# Patient Record
Sex: FEMALE | Race: WHITE | Employment: PART TIME | URBAN - METROPOLITAN AREA
[De-identification: names, ages, dates, MRNs, and addresses within clinical notes are randomized per-mention and may not be internally consistent; named-entity substitution may affect disease eponyms.]

---

## 2017-06-07 NOTE — TELEPHONE ENCOUNTER
Pt need rx refill diltiazem to be sent to pharmacy on file.   Pt have appt on 7-7-17@ 11am    Thanks

## 2017-06-08 RX ORDER — DILTIAZEM HYDROCHLORIDE 240 MG/1
240 CAPSULE, COATED, EXTENDED RELEASE ORAL DAILY
Qty: 30 CAP | Refills: 0 | Status: SHIPPED | OUTPATIENT
Start: 2017-06-08 | End: 2017-07-07 | Stop reason: SDUPTHER

## 2017-07-07 ENCOUNTER — OFFICE VISIT (OUTPATIENT)
Dept: CARDIOLOGY CLINIC | Age: 42
End: 2017-07-07

## 2017-07-07 VITALS
HEIGHT: 66 IN | RESPIRATION RATE: 16 BRPM | DIASTOLIC BLOOD PRESSURE: 86 MMHG | SYSTOLIC BLOOD PRESSURE: 114 MMHG | HEART RATE: 73 BPM | BODY MASS INDEX: 21.79 KG/M2 | OXYGEN SATURATION: 99 % | WEIGHT: 135.6 LBS

## 2017-07-07 DIAGNOSIS — R06.02 SOB (SHORTNESS OF BREATH): ICD-10-CM

## 2017-07-07 DIAGNOSIS — I47.1 PAROXYSMAL SVT (SUPRAVENTRICULAR TACHYCARDIA) (HCC): Primary | ICD-10-CM

## 2017-07-07 DIAGNOSIS — R07.89 CHEST TIGHTNESS: ICD-10-CM

## 2017-07-07 DIAGNOSIS — E78.2 MIXED HYPERLIPIDEMIA: ICD-10-CM

## 2017-07-07 DIAGNOSIS — R00.2 PALPITATIONS: ICD-10-CM

## 2017-07-07 RX ORDER — DILTIAZEM HYDROCHLORIDE 300 MG/1
300 CAPSULE, COATED, EXTENDED RELEASE ORAL DAILY
Qty: 30 CAP | Refills: 11 | Status: SHIPPED | OUTPATIENT
Start: 2017-07-07 | End: 2018-07-25 | Stop reason: SDUPTHER

## 2017-07-07 NOTE — PROGRESS NOTES
See cardiology for results      Patient received a 2 week event monitor. Instructions given verbally as well as an instruction sheet. Pt verbalized understanding.

## 2017-07-07 NOTE — MR AVS SNAPSHOT
Visit Information Date & Time Provider Department Dept. Phone Encounter #  
 7/7/2017 11:30 AM [facility] [phone] [id number] Upcoming Health Maintenance Date Due DTaP/Tdap/Td series (1 - Tdap) 10/15/1995 PAP AKA CERVICAL CYTOLOGY 10/15/1995 INFLUENZA AGE 9 TO ADULT 8/1/2017 Allergies as of 7/7/2017  Review Complete On: 7/7/2017 By: Sg Falcon, NP Severity Noted Reaction Type Reactions Codeine  08/18/2012    Itching Codeine  08/24/2012    Itching Lipitor [Atorvastatin]  01/03/2013    Hives Current Immunizations  Never Reviewed No immunizations on file. Not reviewed this visit You Were Diagnosed With   
  
 Codes Comments Paroxysmal SVT (supraventricular tachycardia) (HCC)    -  Primary ICD-10-CM: I47.1 ICD-9-CM: 427.0 Vitals LMP OB Status Smoking Status 06/14/2017 (Approximate) Having regular periods Never Smoker Preferred Pharmacy Pharmacy Name Phone [id number] [address] [phone] Your Updated Medication List  
  
   
This list is accurate as of: 7/7/17 11:51 AM.  Always use your most recent med list.  
  
  
  
  
 BENADRYL 25 mg capsule Generic drug:  diphenhydrAMINE Take 25 mg by mouth as needed. Alternating with Claritan.  
  
 butalbital-acetaminophen-caffeine -40 mg per tablet Commonly known as:  Brennon Miles Take 2 Tabs by mouth every eight (8) hours as needed for Pain or Headache. Max Daily Amount: 6 Tabs. Refill at 1 month intervals  Indications: MIGRAINE, TENSION-TYPE HEADACHE  
  
 cholecalciferol (vitamin D3) 2,000 unit Tab Take  by mouth. CLARITIN 10 mg tablet Generic drug:  loratadine Take 10 mg by mouth daily. dilTIAZem  mg ER capsule Commonly known as:  CARDIZEM CD Take 1 Cap by mouth daily. escitalopram oxalate 5 mg tablet Commonly known as:  Hilo Evelyne Take 1 Tab by mouth daily. MOTRIN  mg tablet Generic drug:  ibuprofen Take 400 mg by mouth every six (6) hours as needed. naproxen 500 mg tablet Commonly known as:  NAPROSYN Take 500 mg by mouth two (2) times daily (with meals). NEXPLANON 68 mg Impl Generic drug:  etonogestrel  
by SubDERmal route. ondansetron 4 mg disintegrating tablet Commonly known as:  ZOFRAN ODT Take 1 Tab by mouth every eight (8) hours as needed for Nausea. pravastatin 40 mg tablet Commonly known as:  PRAVACHOL  
TAKE 1 TABLET EVERY DAY  
  
 traMADol 50 mg tablet Commonly known as:  ULTRAM  
Take 50 mg by mouth every six (6) hours as needed for Pain. We Performed the Following LOOP MONITOR [[id number] Custom] Please provide this summary of care documentation to your next provider. Your primary care clinician is listed as Janet Kwok. If you have any questions after today's visit, please call [phone].

## 2017-07-07 NOTE — PROGRESS NOTES
Subjective/HPI:     Wing Schirmer is a 42 y.o. female is here for f/u appt. She reports that she has noticed over the past few months having increased frequency of palpitations. She would say on average it now occurs once a week. She notices them primarily in the evening. It is a rapid fluttering lasting about a minute. She has noticed feeling often like she needs to take a deep breath. She has tightness and heart squeezing sensation in her chest at times. The patient denies orthopnea, PND, LE edema, syncope, presyncope or fatigue. PCP Provider  Criselda Yee MD  Past Medical History:   Diagnosis Date    Hypercholesteremia     PSVT (paroxysmal supraventricular tachycardia) ([facility] [address]       Past Surgical History:   Procedure Laterality Date    HX GYN       x1    HX GYN      right salpingectomy    HX HEENT      tubes left ear    HX ORTHOPAEDIC      right mensicus and ACL     Allergies   Allergen Reactions    Codeine Itching    Codeine Itching    Lipitor [Atorvastatin] Hives      Family History   Problem Relation Age of Onset    Neuropathy Mother     Diabetes Mother     Cancer Father      prostate    Heart Disease Father     Stroke Father     High Cholesterol Father     Cancer Maternal Grandmother      pancreatic    Heart Disease Paternal Grandmother     Stroke Paternal Grandmother     Anxiety Child     Attention Deficit Hyperactivity Disorder Child     Other Child      autism and dyslexia      Current Outpatient Prescriptions   Medication Sig    etonogestrel (NEXPLANON) 68 mg impl by SubDERmal route.  dilTIAZem CD (CARDIZEM CD) 240 mg ER capsule Take 1 Cap by mouth daily.  escitalopram oxalate (LEXAPRO) 5 mg tablet Take 1 Tab by mouth daily.  butalbital-acetaminophen-caffeine (FIORICET, ESGIC) -40 mg per tablet Take 2 Tabs by mouth every eight (8) hours as needed for Pain or Headache. Max Daily Amount: 6 Tabs.  Refill at 1 month intervals  Indications: MIGRAINE, TENSION-TYPE HEADACHE    ondansetron (ZOFRAN ODT) 4 mg disintegrating tablet Take 1 Tab by mouth every eight (8) hours as needed for Nausea.  cholecalciferol, vitamin D3, 2,000 unit tab Take  by mouth.  diphenhydrAMINE (BENADRYL) 25 mg capsule Take 25 mg by mouth as needed. Alternating with Claritan.  loratadine (CLARITIN) 10 mg tablet Take 10 mg by mouth daily.  ibuprofen (MOTRIN IB) 200 mg tablet Take 400 mg by mouth every six (6) hours as needed.  naproxen (NAPROSYN) 500 mg tablet Take 500 mg by mouth two (2) times daily (with meals).  traMADol (ULTRAM) 50 mg tablet Take 50 mg by mouth every six (6) hours as needed for Pain.  pravastatin (PRAVACHOL) 40 mg tablet TAKE 1 TABLET EVERY DAY     No current facility-administered medications for this visit. Vitals:    07/07/17 1051 07/07/17 1059   BP: 118/90 114/86   Pulse: 73    Resp: 16    SpO2: 99%    Weight: 135 lb 9.6 oz (61.5 kg)    Height: 5' 6\" (1.676 m)      Social History     Social History    Marital status:      Spouse name: N/A    Number of children: N/A    Years of education: N/A     Occupational History    Not on file. Social History Main Topics    Smoking status: Never Smoker    Smokeless tobacco: Not on file    Alcohol use No      Comment: socially    Drug use: Not on file    Sexual activity: Not on file     Other Topics Concern    Not on file     Social History Narrative    ** Merged History Encounter **            I have reviewed the nurses notes, vitals, problem list, allergy list, medical history, family, social history and medications. Review of Symptoms:    General: Pt denies excessive weight gain or loss. Pt is able to conduct ADL's  HEENT: Denies blurred vision, headaches, epistaxis and difficulty swallowing. Respiratory: + shortness of breath, ANDRES, wheezing or stridor.   Cardiovascular: +chest tightness/squeezing, + palpitations, edema or PND  Gastrointestinal: Denies poor appetite, indigestion, abdominal pain or blood in stool  Urinary: Denies dysuria, pyuria  Musculoskeletal: Denies pain or swelling from muscles or joints  Neurologic: Denies tremor, paresthesias, or sensory motor disturbance  Skin: Denies rash, itching or texture change. Psych: Denies depression        Physical Exam:      General: Well developed, in no acute distress, cooperative and alert  HEENT: No carotid bruits, no JVD, trach is midline. Neck Supple, PEERL, EOM intact. Heart:  Normal S1/S2 negative S3 or S4. Regular, no murmur, gallop or rub.   Respiratory: Clear bilaterally x 4, no wheezing or rales  Abdomen:   Soft, non-tender, no masses, bowel sounds are active.   Extremities:  No edema, normal cap refill, no cyanosis, atraumatic. Neuro: A&Ox3, speech clear, gait stable. Skin: Skin color is normal. No rashes or lesions.  Non diaphoretic  Vascular: 2+ pulses symmetric in all extremities    Cardiographics    ECG: SR, HR 63    Results for orders placed or performed in visit on 08/24/12   HOLTER MONITOR, 24 HOURS    Narrative    ECG Monitor/24 hours, Complete    Reason for Holter Monitor   palpitations    Heartbeat    Slowest 49  Average 87  Fastest  181      Results:   Underlying Rhythm: Normal sinus rhythm      Atrial Arrhythmias: premature atrial contractions; rare, PSVT             AV Conduction: normal    Ventricular Arrhythmias: none     ST Segment Analysis:normal     Symptom Correlation:  PSVT    Comment:   PSVT up to 180 BPM.     Sara Nelson MD                    Cardiology Labs:  No results found for: CHOL, CHOLX, CHLST, CHOLV, 033795, HDL, LDL, LDLC, DLDLP, TGLX, TRIGL, TRIGP, CHHD, [facility]    Lab Results   Component Value Date/Time    Sodium 141 08/18/2012 04:09 PM    Potassium 3.2 08/18/2012 04:09 PM    Chloride 106 08/18/2012 04:09 PM    CO2 31 08/18/2012 04:09 PM    Anion gap 4 08/18/2012 04:09 PM    Glucose 92 08/18/2012 04:09 PM    BUN 15 08/18/2012 04:09 PM    Creatinine 1.1 08/18/2012 04:09 PM    BUN/Creatinine ratio 14 08/18/2012 04:09 PM    GFR est AA >60 08/18/2012 04:09 PM    GFR est non-AA 59 08/18/2012 04:09 PM    Calcium 9.9 08/18/2012 04:09 PM    AST (SGOT) 20 08/18/2012 04:09 PM    Alk. phosphatase 110 08/18/2012 04:09 PM    Protein, total 8.4 08/18/2012 04:09 PM    Albumin 4.6 08/18/2012 04:09 PM    Globulin 3.8 08/18/2012 04:09 PM    A-G Ratio 1.2 08/18/2012 04:09 PM    ALT (SGPT) 37 08/18/2012 04:09 PM           Assessment:     Assessment:     Randal Medina was seen today for svt, cholesterol problem, palpitations, shortness of breath and fatigue. Diagnoses and all orders for this visit:    Paroxysmal SVT (supraventricular tachycardia) (HCC)  -     AMB POC EKG ROUTINE W/ 12 LEADS, INTER & REP  -     TSH REFLEX TO T4  -     MAGNESIUM  -     METABOLIC PANEL, BASIC  -     EKG TREADMILL STRESS TEST; Future    Palpitations  -     TSH REFLEX TO T4  -     MAGNESIUM  -     METABOLIC PANEL, BASIC  -     EKG TREADMILL STRESS TEST; Future    Mixed hyperlipidemia  -     EKG TREADMILL STRESS TEST; Future    Chest tightness  -     TSH REFLEX TO T4  -     MAGNESIUM  -     METABOLIC PANEL, BASIC  -     EKG TREADMILL STRESS TEST; Future    SOB (shortness of breath)  -     TSH REFLEX TO T4  -     MAGNESIUM  -     METABOLIC PANEL, BASIC  -     EKG TREADMILL STRESS TEST; Future        ICD-10-CM ICD-9-CM    1. Paroxysmal SVT (supraventricular tachycardia) (HCC) I47.1 427.0 AMB POC EKG ROUTINE W/ 12 LEADS, INTER & REP      TSH REFLEX TO T4      MAGNESIUM      METABOLIC PANEL, BASIC      EKG TREADMILL STRESS TEST   2. Palpitations R00.2 785.1 TSH REFLEX TO T4      MAGNESIUM      METABOLIC PANEL, BASIC      EKG TREADMILL STRESS TEST   3. Mixed hyperlipidemia E78.2 272.2 EKG TREADMILL STRESS TEST   4. Chest tightness R07.89 786.59 TSH REFLEX TO T4      MAGNESIUM      METABOLIC PANEL, BASIC      EKG TREADMILL STRESS TEST   5.  SOB (shortness of breath) R06.02 786.05 TSH REFLEX TO T4      MAGNESIUM      METABOLIC PANEL, BASIC      EKG TREADMILL STRESS TEST     Orders Placed This Encounter    TSH REFLEX TO T4    MAGNESIUM    METABOLIC PANEL, BASIC    AMB POC EKG ROUTINE W/ 12 LEADS, INTER & REP     Order Specific Question:   Reason for Exam:     Answer:   Routine    EKG TREADMILL STRESS TEST     Standing Status:   Future     Standing Expiration Date:   1/6/2018     Order Specific Question:   Reason for Exam:     Answer:   chest tightness, sob    etonogestrel (NEXPLANON) 68 mg impl     Sig: by SubDERmal route. Plan:     Patient presents today for f/u appt, reports increased frequency of palpitations, occurring not less than every 48 hours, on average once a week. She is also having sob and chest tightness/squeezing. EKG remains SR. BP normotensive. I will evaluate with an event monitor, ekg treadmill stress test, and BMP/MG/TSH. I will increase her Diltiazem to 300mg po every day. f/u in after testing is completed. Subhash Glasgow NP      [facility]    7/7/2017         Agree with note as outlined by  NP. I confirm findings in history and physical exam. No additional findings noted. Agree with plan as outlined above.      Kristine Gonzalez MD

## 2017-07-07 NOTE — PROGRESS NOTES
Chief Complaint   Patient presents with    SVT     annual follow up    Cholesterol Problem     \"    Palpitations     pt reports palpitations are more frequent x 3 mos    Shortness of Breath    Fatigue

## 2017-07-11 LAB
BUN SERPL-MCNC: 13 MG/DL (ref 6–24)
BUN/CREAT SERPL: 14 (ref 9–23)
CALCIUM SERPL-MCNC: 9.2 MG/DL (ref 8.7–10.2)
CHLORIDE SERPL-SCNC: 101 MMOL/L (ref 96–106)
CO2 SERPL-SCNC: 27 MMOL/L (ref 18–29)
CREAT SERPL-MCNC: 0.94 MG/DL (ref 0.57–1)
GLUCOSE SERPL-MCNC: 84 MG/DL (ref 65–99)
MAGNESIUM SERPL-MCNC: 2.2 MG/DL (ref 1.6–2.3)
POTASSIUM SERPL-SCNC: 4.5 MMOL/L (ref 3.5–5.2)
SODIUM SERPL-SCNC: 141 MMOL/L (ref 134–144)
TSH SERPL DL<=0.005 MIU/L-ACNC: 1.38 UIU/ML (ref 0.45–4.5)

## 2017-07-28 NOTE — PROGRESS NOTES
Spoke with patient  Verified patient with 2 patient identifier  Informed per Dr Billy Mis normal.  Patient verbalized understanding.

## 2017-08-02 ENCOUNTER — CLINICAL SUPPORT (OUTPATIENT)
Dept: CARDIOLOGY CLINIC | Age: 42
End: 2017-08-02

## 2017-08-02 ENCOUNTER — TELEPHONE (OUTPATIENT)
Dept: CARDIOLOGY CLINIC | Age: 42
End: 2017-08-02

## 2017-08-02 DIAGNOSIS — E78.2 MIXED HYPERLIPIDEMIA: ICD-10-CM

## 2017-08-02 DIAGNOSIS — R06.02 SOB (SHORTNESS OF BREATH): ICD-10-CM

## 2017-08-02 DIAGNOSIS — R00.2 PALPITATIONS: ICD-10-CM

## 2017-08-02 DIAGNOSIS — I47.1 PAROXYSMAL SVT (SUPRAVENTRICULAR TACHYCARDIA) (HCC): ICD-10-CM

## 2017-08-02 DIAGNOSIS — R07.89 CHEST TIGHTNESS: ICD-10-CM

## 2017-08-02 NOTE — TELEPHONE ENCOUNTER
Verified patient with two identifiers. Pt informed of event monitor results. Appt made for Aug 14 to discuss all test results. Pt verbalized understanding.

## 2017-08-02 NOTE — TELEPHONE ENCOUNTER
----- Message from [address], MD sent at 7/31/2017  6:09 PM EDT -----  Event monitor shows PVC's.  F/u after stress test. thx.

## 2017-08-14 ENCOUNTER — OFFICE VISIT (OUTPATIENT)
Dept: CARDIOLOGY CLINIC | Age: 42
End: 2017-08-14

## 2017-08-14 VITALS
RESPIRATION RATE: 18 BRPM | OXYGEN SATURATION: 98 % | WEIGHT: 137.8 LBS | SYSTOLIC BLOOD PRESSURE: 124 MMHG | BODY MASS INDEX: 22.14 KG/M2 | HEIGHT: 66 IN | HEART RATE: 68 BPM | DIASTOLIC BLOOD PRESSURE: 78 MMHG

## 2017-08-14 DIAGNOSIS — I47.1 PAROXYSMAL SVT (SUPRAVENTRICULAR TACHYCARDIA) (HCC): Primary | ICD-10-CM

## 2017-08-14 NOTE — PROGRESS NOTES
Disha Perez DNP, ANP-BC  Subjective/HPI:     Kurtis Matson is a 42 y.o. female is here for routine f/u. The patient denies chest pain/ shortness of breath, orthopnea, PND, LE edema, palpitations, syncope, presyncope or fatigue. Pt reports improvement in palpitations. Normal Exercise Stress Test   ECG RECORDING, XMIT/REVIEW/INTRPRET    Reason for Event Monitor  PALPITATIONS    Monitoring period 2 Weeks    Events Recorded 11           Event Monitor Results:    Arrhthymias:premature ventricular contractions    Symptom Correlation:   yes    Comments:   Single PVC's that appear to correlate with patient symptoms.      [address], MD       PCP Provider  Cris Garces MD  Past Medical History:   Diagnosis Date    Hypercholesteremia     PSVT (paroxysmal supraventricular tachycardia) ([facility] [address]       Past Surgical History:   Procedure Laterality Date    HX GYN       x1    HX GYN      right salpingectomy    HX HEENT      tubes left ear    HX ORTHOPAEDIC      right mensicus and ACL     Allergies   Allergen Reactions    Codeine Itching    Codeine Itching    Lipitor [Atorvastatin] Hives      Family History   Problem Relation Age of Onset    Neuropathy Mother     Diabetes Mother     Cancer Father      prostate    Heart Disease Father     Stroke Father     High Cholesterol Father     Cancer Maternal Grandmother      pancreatic    Heart Disease Paternal Grandmother     Stroke Paternal Grandmother     Anxiety Child     Attention Deficit Hyperactivity Disorder Child     Other Child      autism and dyslexia      Current Outpatient Prescriptions   Medication Sig    etonogestrel (NEXPLANON) 68 mg impl by SubDERmal route.  dilTIAZem CD (CARDIZEM CD) 300 mg ER capsule Take 1 Cap by mouth daily.  escitalopram oxalate (LEXAPRO) 5 mg tablet Take 1 Tab by mouth daily.     butalbital-acetaminophen-caffeine (FIORICET, ESGIC) -40 mg per tablet Take 2 Tabs by mouth every eight (8) hours as needed for Pain or Headache. Max Daily Amount: 6 Tabs. Refill at 1 month intervals  Indications: MIGRAINE, TENSION-TYPE HEADACHE    ondansetron (ZOFRAN ODT) 4 mg disintegrating tablet Take 1 Tab by mouth every eight (8) hours as needed for Nausea.  cholecalciferol, vitamin D3, 2,000 unit tab Take  by mouth.  diphenhydrAMINE (BENADRYL) 25 mg capsule Take 25 mg by mouth as needed. Alternating with Claritan.  loratadine (CLARITIN) 10 mg tablet Take 10 mg by mouth daily.  ibuprofen (MOTRIN IB) 200 mg tablet Take 400 mg by mouth every six (6) hours as needed.  naproxen (NAPROSYN) 500 mg tablet Take 500 mg by mouth two (2) times daily (with meals).  traMADol (ULTRAM) 50 mg tablet Take 50 mg by mouth every six (6) hours as needed for Pain.  pravastatin (PRAVACHOL) 40 mg tablet TAKE 1 TABLET EVERY DAY     No current facility-administered medications for this visit. Vitals:    08/14/17 1555 08/14/17 1600 08/14/17 1622   BP: (!) 120/100 (!) 118/100 124/78   Pulse: 68     Resp: 18     SpO2: 98%     Weight: 137 lb 12.8 oz (62.5 kg)     Height: 5' 6\" (1.676 m)       Social History     Social History    Marital status:      Spouse name: N/A    Number of children: N/A    Years of education: N/A     Occupational History    Not on file. Social History Main Topics    Smoking status: Never Smoker    Smokeless tobacco: Never Used    Alcohol use No      Comment: socially    Drug use: Not on file    Sexual activity: Not on file     Other Topics Concern    Not on file     Social History Narrative    ** Merged History Encounter **            I have reviewed the nurses notes, vitals, problem list, allergy list, medical history, family, social history and medications. Review of Symptoms:    General: Pt denies excessive weight gain or loss.  Pt is able to conduct ADL's  HEENT: Denies blurred vision, headaches, epistaxis and difficulty swallowing. Respiratory: Denies shortness of breath, ANDRES, wheezing or stridor. Cardiovascular: Denies precordial pain, palpitations, edema or PND  Gastrointestinal: Denies poor appetite, indigestion, abdominal pain or blood in stool  Musculoskeletal: Denies pain or swelling from muscles or joints  Neurologic: Denies tremor, paresthesias, or sensory motor disturbance  Skin: Denies rash, itching or texture change. Physical Exam:      General: Well developed, in no acute distress, cooperative and alert  HEENT: No carotid bruits, no JVD, trach is midline. Neck Supple, PEERL, EOM intact. Heart:  Normal S1/S2 negative S3 or S4. Regular, no murmur, gallop or rub.   Respiratory: Clear bilaterally x 4, no wheezing or rales  Abdomen:   Soft, non-tender, no masses, bowel sounds are active.   Extremities:  No edema, normal cap refill, no cyanosis, atraumatic. Neuro: A&Ox3, speech clear, gait stable. Skin: Skin color is normal. No rashes or lesions.  Non diaphoretic  Vascular: 2+ pulses symmetric in all extremities    Cardiographics    ECG:   Results for orders placed or performed in visit on 08/24/12   HOLTER MONITOR, 24 HOURS    Narrative    ECG Monitor/24 hours, Complete    Reason for Holter Monitor   palpitations    Heartbeat    Slowest 49  Average 87  Fastest  181      Results:   Underlying Rhythm: Normal sinus rhythm      Atrial Arrhythmias: premature atrial contractions; rare, PSVT             AV Conduction: normal    Ventricular Arrhythmias: none     ST Segment Analysis:normal     Symptom Correlation:  PSVT    Comment:   PSVT up to 180 BPM.     Kell Park MD                    Cardiology Labs:  No results found for: CHOL, CHOLX, CHLST, CHOLV, 065430, HDL, LDL, LDLC, DLDLP, TGLX, TRIGL, TRIGP, CHHD, CHHDX    Lab Results   Component Value Date/Time    Sodium 141 07/10/2017 08:34 AM    Potassium 4.5 07/10/2017 08:34 AM    Chloride 101 07/10/2017 08:34 AM    CO2 27 07/10/2017 08:34 AM    Anion gap 4 08/18/2012 04:09 PM    Glucose 84 07/10/2017 08:34 AM    BUN 13 07/10/2017 08:34 AM    Creatinine 0.94 07/10/2017 08:34 AM    BUN/Creatinine ratio 14 07/10/2017 08:34 AM    GFR est AA 87 07/10/2017 08:34 AM    GFR est non-AA 75 07/10/2017 08:34 AM    Calcium 9.2 07/10/2017 08:34 AM    Bilirubin, total 0.6 08/18/2012 04:09 PM    AST (SGOT) 20 08/18/2012 04:09 PM    Alk. phosphatase 110 08/18/2012 04:09 PM    Protein, total 8.4 08/18/2012 04:09 PM    Albumin 4.6 08/18/2012 04:09 PM    Globulin 3.8 08/18/2012 04:09 PM    A-G Ratio 1.2 08/18/2012 04:09 PM    ALT (SGPT) 37 08/18/2012 04:09 PM           Assessment:     Assessment:     Diagnoses and all orders for this visit:    1. Paroxysmal SVT (supraventricular tachycardia) ([address]        ICD-10-CM ICD-9-CM    1. Paroxysmal SVT (supraventricular tachycardia) (HCC) I47.1 427.0      No orders of the defined types were placed in this encounter. Plan:     Ms. Melyssa Pérez is a 43-year-old female with history of paroxysmal SVT, normal exercise stress test, event monitor shows symptomatic PVCs. Tolerating higher dose of diltiazem. Continue same, previous discussion of ablation prefers to continue medicinal therapy at this time. Camilo Sandoval NP      [facility]    8/14/2017         Agree with note as outlined by  NP. I confirm findings in history and physical exam. No additional findings noted. Agree with plan as outlined above.      [address] MD

## 2017-12-02 DIAGNOSIS — F07.81 POST CONCUSSION SYNDROME: ICD-10-CM

## 2017-12-02 DIAGNOSIS — G44.309 POST-CONCUSSION HEADACHE: ICD-10-CM

## 2017-12-02 RX ORDER — ESCITALOPRAM OXALATE 5 MG/1
TABLET ORAL
Qty: 60 TAB | Refills: 4 | Status: SHIPPED | OUTPATIENT
Start: 2017-12-02 | End: 2018-09-28 | Stop reason: SDUPTHER

## 2018-02-28 ENCOUNTER — HOSPITAL ENCOUNTER (OUTPATIENT)
Dept: PREADMISSION TESTING | Age: 43
Discharge: HOME | End: 2018-02-28
Attending: OBSTETRICS & GYNECOLOGY
Payer: COMMERCIAL

## 2018-02-28 VITALS
HEIGHT: 66 IN | DIASTOLIC BLOOD PRESSURE: 87 MMHG | OXYGEN SATURATION: 99 % | TEMPERATURE: 98.3 F | RESPIRATION RATE: 16 BRPM | HEART RATE: 52 BPM | WEIGHT: 143.96 LBS | BODY MASS INDEX: 23.14 KG/M2 | SYSTOLIC BLOOD PRESSURE: 157 MMHG

## 2018-02-28 LAB
ABO + RH BLD: NORMAL
APPEARANCE UR: CLEAR
BACTERIA URNS QL MICRO: NEGATIVE /HPF
BILIRUB UR QL: NEGATIVE
BLOOD GROUP ANTIBODIES SERPL: NORMAL
COLOR UR: ABNORMAL
EPITH CASTS URNS QL MICRO: ABNORMAL /LPF
ERYTHROCYTE [DISTWIDTH] IN BLOOD BY AUTOMATED COUNT: 12.3 % (ref 11.5–14.5)
GLUCOSE UR STRIP.AUTO-MCNC: NEGATIVE MG/DL
HCT VFR BLD AUTO: 44.7 % (ref 35–47)
HGB BLD-MCNC: 15 G/DL (ref 11.5–16)
HGB UR QL STRIP: ABNORMAL
HYALINE CASTS URNS QL MICRO: ABNORMAL /LPF (ref 0–5)
KETONES UR QL STRIP.AUTO: NEGATIVE MG/DL
LEUKOCYTE ESTERASE UR QL STRIP.AUTO: ABNORMAL
MCH RBC QN AUTO: 32.2 PG (ref 26–34)
MCHC RBC AUTO-ENTMCNC: 33.6 G/DL (ref 30–36.5)
MCV RBC AUTO: 95.9 FL (ref 80–99)
NITRITE UR QL STRIP.AUTO: NEGATIVE
NRBC # BLD: 0 K/UL (ref 0–0.01)
NRBC BLD-RTO: 0 PER 100 WBC
PH UR STRIP: 6.5 [PH] (ref 5–8)
PLATELET # BLD AUTO: 241 K/UL (ref 150–400)
PMV BLD AUTO: 11.5 FL (ref 8.9–12.9)
PROT UR STRIP-MCNC: NEGATIVE MG/DL
RBC # BLD AUTO: 4.66 M/UL (ref 3.8–5.2)
RBC #/AREA URNS HPF: ABNORMAL /HPF (ref 0–5)
SP GR UR REFRACTOMETRY: 1.01 (ref 1–1.03)
SPECIMEN EXP DATE BLD: NORMAL
UA: UC IF INDICATED,UAUC: ABNORMAL
UROBILINOGEN UR QL STRIP.AUTO: 0.2 EU/DL (ref 0.2–1)
WBC # BLD AUTO: 6.2 K/UL (ref 3.6–11)
WBC URNS QL MICRO: ABNORMAL /HPF (ref 0–4)

## 2018-02-28 PROCEDURE — 81001 URINALYSIS AUTO W/SCOPE: CPT | Performed by: OBSTETRICS & GYNECOLOGY

## 2018-02-28 PROCEDURE — 36415 COLL VENOUS BLD VENIPUNCTURE: CPT | Performed by: OBSTETRICS & GYNECOLOGY

## 2018-02-28 PROCEDURE — 85027 COMPLETE CBC AUTOMATED: CPT | Performed by: OBSTETRICS & GYNECOLOGY

## 2018-02-28 PROCEDURE — 86850 RBC ANTIBODY SCREEN: CPT | Performed by: OBSTETRICS & GYNECOLOGY

## 2018-02-28 RX ORDER — SODIUM CHLORIDE, SODIUM LACTATE, POTASSIUM CHLORIDE, CALCIUM CHLORIDE 600; 310; 30; 20 MG/100ML; MG/100ML; MG/100ML; MG/100ML
25 INJECTION, SOLUTION INTRAVENOUS CONTINUOUS
Status: CANCELLED | OUTPATIENT
Start: 2018-03-09

## 2018-02-28 RX ORDER — CEFAZOLIN SODIUM/WATER 2 G/20 ML
2 SYRINGE (ML) INTRAVENOUS ONCE
Status: CANCELLED | OUTPATIENT
Start: 2018-03-09 | End: 2018-03-09

## 2018-02-28 NOTE — H&P
Visit Type:  Pre-Op  Primary Provider:  Kenny Domingo MD    CC:  endometriosis. History of Present Illness:  36 yr old patient presents today for pre op     Procedure Info:  Procedure: TLH, bilateral salpingectomy, Nexplanon removal  Diagnosis Primary: ENDOMETRIOSIS  Anticipated Time Out of Work: 3-6 weeks  Surg Date: 2018  Surgery Loc: [facility]  Start Time: 11am          Vital Signs   36Years Old Female  Height:  67.75 inches  Weight: 145 pounds  BMI:      22.29  BSA:      1.78  BP:       124/84    Past Pregnancy History   : 4  Para:     2  Aborta:  2  Term: 2, Premature: 0, Living Children: 2, Vaginal Deliveries: 1, C-Sections: 1, Elect. Ab: 1, Ectopics: 1    Gynecologic History   Additional Menses Information: nexplanon - irregular  Does patient have any problems with urine leakage? no  Does patient have any other bladder problems? no  History of abnormal pap: no  Gardasil Injection History: Not Applicable  Pt currently sexually active: yes  Current Contraception: Nexplanon. placed 2014  History of STD: no   The patient opts not to have HIV testing today.   Allergies    CODEINE (Moderate)  TERAZOL 7 (TERCONAZOLE CREA) (Moderate)      Medications Removed from Medication List          Past Medical History:     Reviewed history from 2016 and no changes required:        Jean Paul Cobble        Infertility        Endometriosis        PSVT         2014 nexplanon insertion    Past Surgical History:     Reviewed history from 2009 and no changes required:        Laparoscopies - right salpingectomy        Dilatation & Curettage         x 1        ectopic x 1        -1        EAB x 1        ACL reconstruction of right knee 09    Family History Summary:      Reviewed history Last on 2018 and no changes required:2018      General Comments - FH:  Family history transferred to [address] [place] [address]    Social History:     Reviewed history from 2011 and no changes required:                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      Risk Factors:     Smoked Tobacco Use:  Never smoker  Smokeless Tobacco Use:  Never  Passive smoke exposure:  no  Drug use:  no  HIV high-risk behavior:  no  Caffeine use:  12oz/day drinks per day  Alcohol use:  yes     Type:  Occassional  Exercise:  yes     Times per week:  5+     Type of Exercise:  Walking  Seatbelt use:  100 %  Sun Exposure:  Frequently    Dietary Counseling: yes    Previous Tobacco Use: Signed On 2018  Smoked Tobacco Use:  Never smoker  Passive smoke exposure:  no  Drug use:  no  HIV high-risk behavior:  no  Caffeine use:  12oz/day drinks per day    Previous Alcohol Use: Signed On 2018  Alcohol use:  yes     Type:  Occassional  Exercise:  yes     Times per week:  5+     Type of Exercise:  Walking  Seatbelt use:  100 %  Sun Exposure:  Frequently    Dietary Counseling: yes    Mammogram History:     Date of Last Mammogram:  2018    PAP Smear History:     Date of Last PAP Smear:  2016      Past Pregnancy History      :  4     Term Births:  2     Premature Births: 0     Living Children: 2     Para:   2     Prev : 1     Aborta:  2     Elect. Ab:  1     Spont.  Ab:  0     Ectopics:  1    Pregnancy # 1     Delivery date:        Comments:  ectopic     Pregnancy # 2     Delivery date:        Weeks Gestation: 39     Delivery type:        Hours of labor:   18     Anesthesia type:   epidural     Delivery location:   [facility]     Infant Sex:  Female     Birth weight:  7lbs 1 oz     Name:  Mahnaz     Comments:  dr. Lawson Later to progress past 8 cms    Pregnancy # 3     Delivery date:   10/10/2007     Delivery type:        Delivery location:   [facility]     Infant Sex:  Male     Birth weight:  7lbs 7ozs     Comments:  Del by Dr. Stevie Callejas       Review of Systems        See HPI      General Medical Physical Exam:     General Appearance:       well developed, well nourished, in no acute distress    Head: Inspection:  normocephalic without obvious abnormalities    Eyes:        External:  EOM intact    Ears, Nose, Throat:        External:  normocephalic and atraumatic       Hearing:  grossly intact    Neck:        Neck:  supple; no masses; trachea midline       Thyroid:  no nodules, masses, tenderness, or enlargement    Respiratory:        Resp. effort:  no use of accessory muscles       Auscultation:   no rales, rhonchi, or wheezes    Cardiovascular: Auscultation:   normal S1 and  S2; no murmur, rub, or gallop       Peripheral circ: no cyanosis, clubbing, or edema    Gastrointestinal:        Abdomen:  soft and non-tender; no masses       Liver/spleen:   no enlargement or nodularity       Hernia:  no hernias    Musculoskeletal:        Gait/station:  normal gait    Skin:        Inspection:  no rashes, suspicious lesions, or ulcerations    Neurological:        Other:  grossly intact    Psychiatric:       Orientation:  oriented to time, place, and person              Impression & Recommendations:    Problem # 1:  ENDOMETRIOSIS (ICD-617.9) (JUT35-H32.9)   We will plan total laparoscopic hysterectomy. We discussed the risks and benefits of ovarian conservation and the pt would like to have her ovaries left in place as long as they are normal.  She understands that oopherectomy would mean immediate menopause. We also discussed supracervical vs total laparoscopic hyst, and she would like to proceed with the total procedure. She understands that if surgical complications necessitate supracervical hyst, that there is a risk of cyclic menstrual bleeding. I reviewed with the patient indications, alternatives, risks, and benefits of proposed procedure, the risks of which include injury to bowel, bladder, nerves, blood vessels, or ureters, injury to any other intraabdominal structure, risk of bleeding and infection, and inherent risks of anesthesia, including death.   The patient indicates understanding of these risks, and agrees to the proposed procedure. She is instructed to stay NPO after midnight the night before surgery. Also planning to remove nexplanon from left arm.      Orders:  Pre-Op Exam (CPT-PO)  Specimen Handling (CPT-14216)  CBC w/o Dif ([id number])      Medications (at conclusion of this visit)    02/12/2018 LEXAPRO TABLET (ESCITALOPRAM OXALATE TABS) Prescribed by non 606/70Lior Levine MD  03/23/2016 TOLTERODINE TARTRATE ER 4 MG ORAL CAPSULE EXTENDED RELEASE 24 HOUR (TOLTERODINE TARTRATE) 1 by mouth daily  09/19/2012 NEXPLANON 68 MG SUBCUTANEOUS IMPLANT (ETONOGESTREL) placed 2014 09/19/2012 DILTIAZEM CD CAPSULE EXTENDED RELEASE 24 HOUR (DILTIAZEM HCL COATED BEADS XR24H-CAP) Prescribed by non 606/Jessica Levine MD  12/02/2009 MULTIVITAMINS   TABS (MULTIPLE VITAMIN) Prescribed by daniel Clifton/Jessica Levine MD          Electronically signed by Leon Ching MD on 02/28/2018 at 8:37 AM    ________________________________________________________________________

## 2018-02-28 NOTE — PERIOP NOTES
Called Dr Ayana Ocasio office and spoke to [place] regarding order sheet with surgical procedure. Needs clarified as patient has had right salpingectomy in the past. Denver to clarify with Dr Jim Arguelles and will send new order sheet and call surgical posting to correct.

## 2018-02-28 NOTE — PERIOP NOTES
Incentive Spirometer        Using the incentive spirometer helps expand the small air sacs of your lungs, helps you breathe deeply, and helps improve your lung function. Use your incentive spirometer twice a day (10 breaths each time) prior to surgery. How to Use Your Incentive Spirometer:  1. Hold the incentive spirometer in an upright position. 2. Breathe out as usual.   3. Place the mouthpiece in your mouth and seal your lips tightly around it. 4. Take a deep breath. Breathe in slowly and as deeply as possible. Keep the blue flow rate guide between the arrows. 5. Hold your breath as long as possible. Then exhale slowly and allow the piston to fall to the bottom of the column. 6. Rest for a few seconds and repeat steps one through five at least 10 times. PAT Tidal Volume___________2500_______  x____2____________  Date 02/28/2018_______________________    Floreen Glance THE INCENTIVE SPIROMETER WITH YOU TO THE HOSPITAL ON THE DAY OF YOUR SURGERY. Opportunity given to ask and answer questions as well as to observe return demonstration.     Patient signature_____________________________    Witness____________________________

## 2018-02-28 NOTE — PERIOP NOTES
[facility]  Preoperative Instructions        Surgery Date 03/09/2018          Time of Arrival 0900 am Contact # [phone]    1. On the day of your surgery, please report to the Surgical Services Registration Desk and sign in at your designated time. The Surgery Center is located to the right of the Emergency Room. 2. You must have someone with you to drive you home. You should not drive a car for 24 hours following surgery. Please make arrangements for a friend or family member to stay with you for the first 24 hours after your surgery. 3. Do not have anything to eat or drink (including water, gum, mints, coffee, juice) after midnight ?? .? This may not apply to medications prescribed by your physician. ?(Please note below the special instructions with medications to take the morning of your procedure.)    4. We recommend you do not drink any alcoholic beverages for 24 hours before and after your surgery. 5. Contact your surgeons office for instructions on the following medications: non-steroidal anti-inflammatory drugs (i.e. Advil, Aleve), vitamins, and supplements. (Some surgeons will want you to stop these medications prior to surgery and others may allow you to take them)  **If you are currently taking Plavix, Coumadin, Aspirin and/or other blood-thinning agents, contact your surgeon for instructions. ** Your surgeon will partner with the physician prescribing these medications to determine if it is safe to stop or if you need to continue taking. Please do not stop taking these medications without instructions from your surgeon    6. Wear comfortable clothes. Wear glasses instead of contacts. Do not bring any money or jewelry. Please bring picture ID, insurance card, and any prearranged co-payment or hospital payment. Do not wear make-up, particularly mascara the morning of your surgery. Do not wear nail polish, particularly if you are having foot /hand surgery. Wear your hair loose or down, no ponytails, buns, brian pins or clips. All body piercings must be removed. Please shower with antibacterial soap for three consecutive days before and on the morning of surgery, but do not apply any lotions, powders or deodorants after the shower on the day of surgery. Please use a fresh towels after each shower. Please sleep in clean clothes and change bed linens the night before surgery. Please do not shave for 48 hours prior to surgery. Shaving of the face is acceptable. 7. You should understand that if you do not follow these instructions your surgery may be cancelled. If your physical condition changes (I.e. fever, cold or flu) please contact your surgeon as soon as possible. 8. It is important that you be on time. If a situation occurs where you may be late, please call [phone] (OR Holding Area). 9. If you have any questions and or problems, please call [phone] (Pre-admission Testing). 10. Your surgery time may be subject to change. You will receive a phone call the evening prior if your time changes. 11.  If having outpatient surgery, you must have someone to drive you here, stay with you during the duration of your stay, and to drive you home at time of discharge. 12.   In an effort to improve the efficiency, privacy, and safety for all of our Pre-op patients visitors are not allowed in the Holding area. Once you arrive and are registered your family/visitors will be asked to remain in the waiting room. The Pre-op staff will get you from the Surgical Waiting Area and will explain to you and your family/visitors that the Pre-op phase is beginning. The staff will answer any questions and provide instructions for tracking of the patient, by use of the existing tracking number and color-coded status board in the waiting room.   At this time the staff will also ask for your designated spokesperson information in the event that the physician or staff need to provide an update or obtain any pertinent information. The designated spokesperson will be notified if the physician needs to speak to family during the pre-operative phase. If at any time your family/visitors has questions or concerns they may approach the volunteer desk in the waiting area for assistance. Special Instructions:    MEDICATIONS TO TAKE THE MORNING OF SURGERY WITH A SIP OF Amina Bon if needed      I understand a pre-operative phone call will be made to verify my surgery time. In the event that I am not available, I give permission for a message to be left on my answering service and/or with another person?   Yes       ___________________      __________   _________    (Signature of Patient)             (Witness)                (Date and Time)

## 2018-03-07 NOTE — H&P
Visit Type:  Pre-Op  Primary Provider:  Lulu Barrios MD    CC:  endometriosis. History of Present Illness:  36 yr old patient presents today for pre op     Procedure Info:  Procedure: TLH, bilateral salpingectomy, Nexplanon removal  Diagnosis Primary: ENDOMETRIOSIS  Anticipated Time Out of Work: 3-6 weeks  Surg Date: 2018  Surgery Loc: [facility]  Start Time: 11am          Vital Signs   36Years Old Female  Height:  67.75 inches  Weight: 145 pounds  BMI:      22.29  BSA:      1.78  BP:       124/84    Past Pregnancy History   : 4  Para:     2  Aborta:  2  Term: 2, Premature: 0, Living Children: 2, Vaginal Deliveries: 1, C-Sections: 1, Elect. Ab: 1, Ectopics: 1    Gynecologic History   Additional Menses Information: nexplanon - irregular  Does patient have any problems with urine leakage? no  Does patient have any other bladder problems? no  History of abnormal pap: no  Gardasil Injection History: Not Applicable  Pt currently sexually active: yes  Current Contraception: Nexplanon. placed 2014  History of STD: no   The patient opts not to have HIV testing today.   Allergies    CODEINE (Moderate)  TERAZOL 7 (TERCONAZOLE CREA) (Moderate)      Medications Removed from Medication List          Past Medical History:     Reviewed history from 2016 and no changes required:        Sonal Jean        Infertility        Endometriosis        PSVT         2014 nexplanon insertion    Past Surgical History:     Reviewed history from 2009 and no changes required:        Laparoscopies - right salpingectomy        Dilatation & Curettage         x 1        ectopic x 1        -1        EAB x 1        ACL reconstruction of right knee 09    Family History Summary:      Reviewed history Last on 2018 and no changes required:2018      General Comments - FH:  Family history transferred to [address] [place] [address] [address] [place] [address]    Social History:     Reviewed history from 2011 and no changes required:                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      Risk Factors:     Smoked Tobacco Use:  Never smoker  Smokeless Tobacco Use:  Never  Passive smoke exposure:  no  Drug use:  no  HIV high-risk behavior:  no  Caffeine use:  12oz/day drinks per day  Alcohol use:  yes     Type:  Occassional  Exercise:  yes     Times per week:  5+     Type of Exercise:  Walking  Seatbelt use:  100 %  Sun Exposure:  Frequently    Dietary Counseling: yes    Previous Tobacco Use: Signed On 2018  Smoked Tobacco Use:  Never smoker  Passive smoke exposure:  no  Drug use:  no  HIV high-risk behavior:  no  Caffeine use:  12oz/day drinks per day    Previous Alcohol Use: Signed On 2018  Alcohol use:  yes     Type:  Occassional  Exercise:  yes     Times per week:  5+     Type of Exercise:  Walking  Seatbelt use:  100 %  Sun Exposure:  Frequently    Dietary Counseling: yes    Mammogram History:     Date of Last Mammogram:  2018    PAP Smear History:     Date of Last PAP Smear:  2016      Past Pregnancy History      :  4     Term Births:  2     Premature Births: 0     Living Children: 2     Para:   2     Prev : 1     Aborta:  2     Elect. Ab:  1     Spont.  Ab:  0     Ectopics:  1    Pregnancy # 1     Delivery date:        Comments:  ectopic     Pregnancy # 2     Delivery date:        Weeks Gestation: 39     Delivery type:        Hours of labor:   18     Anesthesia type:   epidural     Delivery location:   [facility]     Infant Sex:  Female     Birth weight:  7lbs 1 oz     Name:  Mahnaz     Comments:  dr. Mario Warren to progress past 8 cms    Pregnancy # 3     Delivery date:   10/10/2007     Delivery type:        Delivery location:   [facility]     Infant Sex:  Male     Birth weight:  7lbs 7ozs     Comments:  Del by Dr. Simran Harris       Review of Systems        See HPI      General Medical Physical Exam:     General Appearance:       well developed, well nourished, in no acute distress    Head: Inspection:  normocephalic without obvious abnormalities    Eyes:        External:  EOM intact    Ears, Nose, Throat:        External:  normocephalic and atraumatic       Hearing:  grossly intact    Neck:        Neck:  supple; no masses; trachea midline       Thyroid:  no nodules, masses, tenderness, or enlargement    Respiratory:        Resp. effort:  no use of accessory muscles       Auscultation:   no rales, rhonchi, or wheezes    Cardiovascular: Auscultation:   normal S1 and  S2; no murmur, rub, or gallop       Peripheral circ: no cyanosis, clubbing, or edema    Gastrointestinal:        Abdomen:  soft and non-tender; no masses       Liver/spleen:   no enlargement or nodularity       Hernia:  no hernias    Musculoskeletal:        Gait/station:  normal gait    Skin:        Inspection:  no rashes, suspicious lesions, or ulcerations    Neurological:        Other:  grossly intact    Psychiatric:       Orientation:  oriented to time, place, and person              Impression & Recommendations:    Problem # 1:  ENDOMETRIOSIS (ICD-617.9) (JSJ16-S35.9)   We will plan total laparoscopic hysterectomy. We discussed the risks and benefits of ovarian conservation and the pt would like to have her ovaries left in place as long as they are normal.  She understands that oopherectomy would mean immediate menopause. We also discussed supracervical vs total laparoscopic hyst, and she would like to proceed with the total procedure. She understands that if surgical complications necessitate supracervical hyst, that there is a risk of cyclic menstrual bleeding. I reviewed with the patient indications, alternatives, risks, and benefits of proposed procedure, the risks of which include injury to bowel, bladder, nerves, blood vessels, or ureters, injury to any other intraabdominal structure, risk of bleeding and infection, and inherent risks of anesthesia, including death.   The patient indicates understanding of these risks, and agrees to the proposed procedure. She is instructed to stay NPO after midnight the night before surgery. Also planning to remove nexplanon from left arm. Orders:  Pre-Op Exam (CPT-PO)  Specimen Handling (CPT-[id number])  CBC w/o Dif ([id number])      Medications (at conclusion of this visit)    02/12/2018 LEXAPRO TABLET (ESCITALOPRAM OXALATE TABS) Prescribed by non 606/70Lior Levine MD  03/23/2016 TOLTERODINE TARTRATE ER 4 MG ORAL CAPSULE EXTENDED RELEASE 24 HOUR (TOLTERODINE TARTRATE) 1 by mouth daily  09/19/2012 NEXPLANON 68 MG SUBCUTANEOUS IMPLANT (ETONOGESTREL) placed 2014 09/19/2012 DILTIAZEM CD CAPSULE EXTENDED RELEASE 24 HOUR (DILTIAZEM HCL COATED BEADS XR24H-CAP) Prescribed by non 606/Jessica Levine MD  12/02/2009 MULTIVITAMINS   TABS (MULTIPLE VITAMIN) Prescribed by daniel Clifton/Jessica Levine MD          Electronically signed by Alicia Dumas MD on 02/28/2018 at 8:37 AM    ________________________________________________________________________    The History and Physical is reviewed today. The patient is seen and examined and no changes are required.   Alicia Dumas MD  3/9/2018  11:10 AM

## 2018-03-09 ENCOUNTER — HOSPITAL ENCOUNTER (OUTPATIENT)
Age: 43
Setting detail: OUTPATIENT SURGERY
Discharge: HOME | End: 2018-03-09
Attending: OBSTETRICS & GYNECOLOGY | Admitting: OBSTETRICS & GYNECOLOGY
Payer: COMMERCIAL

## 2018-03-09 ENCOUNTER — ANESTHESIA (OUTPATIENT)
Dept: SURGERY | Age: 43
End: 2018-03-09
Payer: COMMERCIAL

## 2018-03-09 ENCOUNTER — ANESTHESIA EVENT (OUTPATIENT)
Dept: SURGERY | Age: 43
End: 2018-03-09
Payer: COMMERCIAL

## 2018-03-09 VITALS
HEART RATE: 77 BPM | OXYGEN SATURATION: 97 % | SYSTOLIC BLOOD PRESSURE: 124 MMHG | WEIGHT: 142.42 LBS | BODY MASS INDEX: 22.89 KG/M2 | RESPIRATION RATE: 16 BRPM | HEIGHT: 66 IN | TEMPERATURE: 97.8 F | DIASTOLIC BLOOD PRESSURE: 78 MMHG

## 2018-03-09 DIAGNOSIS — G44.309 POST-CONCUSSION HEADACHE: Primary | ICD-10-CM

## 2018-03-09 LAB — HCG UR QL: NEGATIVE

## 2018-03-09 PROCEDURE — 77030020061 HC IV BLD WRMR ADMIN SET 3M -B: Performed by: NURSE ANESTHETIST, CERTIFIED REGISTERED

## 2018-03-09 PROCEDURE — 81025 URINE PREGNANCY TEST: CPT

## 2018-03-09 PROCEDURE — 77030008517 HC TBNG INSUF ENDO STOR -B: Performed by: OBSTETRICS & GYNECOLOGY

## 2018-03-09 PROCEDURE — 74011250636 HC RX REV CODE- 250/636

## 2018-03-09 PROCEDURE — 77030010032 HC SCLPL DISECT HARM J&J -C: Performed by: OBSTETRICS & GYNECOLOGY

## 2018-03-09 PROCEDURE — 77030026438 HC STYL ET INTUB CARD -A: Performed by: NURSE ANESTHETIST, CERTIFIED REGISTERED

## 2018-03-09 PROCEDURE — 77030020702 HC ADAPT HARM DISP J&J -B: Performed by: OBSTETRICS & GYNECOLOGY

## 2018-03-09 PROCEDURE — 76210000020 HC REC RM PH II FIRST 0.5 HR: Performed by: OBSTETRICS & GYNECOLOGY

## 2018-03-09 PROCEDURE — 74011250636 HC RX REV CODE- 250/636: Performed by: ANESTHESIOLOGY

## 2018-03-09 PROCEDURE — 74011000250 HC RX REV CODE- 250

## 2018-03-09 PROCEDURE — 88307 TISSUE EXAM BY PATHOLOGIST: CPT | Performed by: OBSTETRICS & GYNECOLOGY

## 2018-03-09 PROCEDURE — 77030008684 HC TU ET CUF COVD -B: Performed by: NURSE ANESTHETIST, CERTIFIED REGISTERED

## 2018-03-09 PROCEDURE — 74011250637 HC RX REV CODE- 250/637: Performed by: ANESTHESIOLOGY

## 2018-03-09 PROCEDURE — 77030018684: Performed by: OBSTETRICS & GYNECOLOGY

## 2018-03-09 PROCEDURE — 77030008771 HC TU NG SALEM SUMP -A: Performed by: NURSE ANESTHETIST, CERTIFIED REGISTERED

## 2018-03-09 PROCEDURE — 77030008606 HC TRCR ENDOSC KII AMR -B: Performed by: OBSTETRICS & GYNECOLOGY

## 2018-03-09 PROCEDURE — 76210000017 HC OR PH I REC 1.5 TO 2 HR: Performed by: OBSTETRICS & GYNECOLOGY

## 2018-03-09 PROCEDURE — 77030008756 HC TU IRR SUC STRY -B: Performed by: OBSTETRICS & GYNECOLOGY

## 2018-03-09 PROCEDURE — 74011250636 HC RX REV CODE- 250/636: Performed by: OBSTETRICS & GYNECOLOGY

## 2018-03-09 PROCEDURE — 77030018836 HC SOL IRR NACL ICUM -A: Performed by: OBSTETRICS & GYNECOLOGY

## 2018-03-09 PROCEDURE — 77030035236 HC SUT PDS STRATFX BARB J&J -B: Performed by: OBSTETRICS & GYNECOLOGY

## 2018-03-09 PROCEDURE — 76060000034 HC ANESTHESIA 1.5 TO 2 HR: Performed by: OBSTETRICS & GYNECOLOGY

## 2018-03-09 PROCEDURE — 77030019908 HC STETH ESOPH SIMS -A: Performed by: NURSE ANESTHETIST, CERTIFIED REGISTERED

## 2018-03-09 PROCEDURE — 77030020263 HC SOL INJ SOD CL0.9% LFCR 1000ML: Performed by: OBSTETRICS & GYNECOLOGY

## 2018-03-09 PROCEDURE — 77030010507 HC ADH SKN DERMBND J&J -B: Performed by: OBSTETRICS & GYNECOLOGY

## 2018-03-09 PROCEDURE — 76010000153 HC OR TIME 1.5 TO 2 HR: Performed by: OBSTETRICS & GYNECOLOGY

## 2018-03-09 PROCEDURE — 77030002933 HC SUT MCRYL J&J -A: Performed by: OBSTETRICS & GYNECOLOGY

## 2018-03-09 PROCEDURE — 77030034850: Performed by: OBSTETRICS & GYNECOLOGY

## 2018-03-09 PROCEDURE — 77030032490 HC SLV COMPR SCD KNE COVD -B: Performed by: OBSTETRICS & GYNECOLOGY

## 2018-03-09 PROCEDURE — 77030016151 HC PROTCTR LNS DFOG COVD -B: Performed by: OBSTETRICS & GYNECOLOGY

## 2018-03-09 PROCEDURE — 77030034154 HC SHR COAG HARM ACE J&J -F: Performed by: OBSTETRICS & GYNECOLOGY

## 2018-03-09 RX ORDER — OXYCODONE AND ACETAMINOPHEN 5; 325 MG/1; MG/1
1-2 TABLET ORAL
Qty: 20 TAB | Refills: 0 | Status: SHIPPED | OUTPATIENT
Start: 2018-03-09 | End: 2020-09-10

## 2018-03-09 RX ORDER — MIDAZOLAM HYDROCHLORIDE 1 MG/ML
INJECTION, SOLUTION INTRAMUSCULAR; INTRAVENOUS AS NEEDED
Status: DISCONTINUED | OUTPATIENT
Start: 2018-03-09 | End: 2018-03-09 | Stop reason: HOSPADM

## 2018-03-09 RX ORDER — SCOLOPAMINE TRANSDERMAL SYSTEM 1 MG/1
1 PATCH, EXTENDED RELEASE TRANSDERMAL
Status: DISCONTINUED | OUTPATIENT
Start: 2018-03-09 | End: 2018-03-09 | Stop reason: HOSPADM

## 2018-03-09 RX ORDER — LIDOCAINE HYDROCHLORIDE 20 MG/ML
INJECTION, SOLUTION EPIDURAL; INFILTRATION; INTRACAUDAL; PERINEURAL AS NEEDED
Status: DISCONTINUED | OUTPATIENT
Start: 2018-03-09 | End: 2018-03-09 | Stop reason: HOSPADM

## 2018-03-09 RX ORDER — MIDAZOLAM HYDROCHLORIDE 1 MG/ML
0.5 INJECTION, SOLUTION INTRAMUSCULAR; INTRAVENOUS
Status: DISCONTINUED | OUTPATIENT
Start: 2018-03-09 | End: 2018-03-09 | Stop reason: HOSPADM

## 2018-03-09 RX ORDER — FENTANYL CITRATE 50 UG/ML
25 INJECTION, SOLUTION INTRAMUSCULAR; INTRAVENOUS
Status: DISCONTINUED | OUTPATIENT
Start: 2018-03-09 | End: 2018-03-09 | Stop reason: HOSPADM

## 2018-03-09 RX ORDER — SODIUM CHLORIDE, SODIUM LACTATE, POTASSIUM CHLORIDE, CALCIUM CHLORIDE 600; 310; 30; 20 MG/100ML; MG/100ML; MG/100ML; MG/100ML
25 INJECTION, SOLUTION INTRAVENOUS CONTINUOUS
Status: DISCONTINUED | OUTPATIENT
Start: 2018-03-09 | End: 2018-03-09 | Stop reason: HOSPADM

## 2018-03-09 RX ORDER — DIPHENHYDRAMINE HYDROCHLORIDE 50 MG/ML
12.5 INJECTION, SOLUTION INTRAMUSCULAR; INTRAVENOUS AS NEEDED
Status: COMPLETED | OUTPATIENT
Start: 2018-03-09 | End: 2018-03-09

## 2018-03-09 RX ORDER — SODIUM CHLORIDE, SODIUM LACTATE, POTASSIUM CHLORIDE, CALCIUM CHLORIDE 600; 310; 30; 20 MG/100ML; MG/100ML; MG/100ML; MG/100ML
100 INJECTION, SOLUTION INTRAVENOUS CONTINUOUS
Status: DISCONTINUED | OUTPATIENT
Start: 2018-03-09 | End: 2018-03-09 | Stop reason: HOSPADM

## 2018-03-09 RX ORDER — LABETALOL HYDROCHLORIDE 5 MG/ML
INJECTION, SOLUTION INTRAVENOUS AS NEEDED
Status: DISCONTINUED | OUTPATIENT
Start: 2018-03-09 | End: 2018-03-09 | Stop reason: HOSPADM

## 2018-03-09 RX ORDER — ACETAMINOPHEN 10 MG/ML
INJECTION, SOLUTION INTRAVENOUS AS NEEDED
Status: DISCONTINUED | OUTPATIENT
Start: 2018-03-09 | End: 2018-03-09 | Stop reason: HOSPADM

## 2018-03-09 RX ORDER — PROPOFOL 10 MG/ML
INJECTION, EMULSION INTRAVENOUS
Status: DISCONTINUED | OUTPATIENT
Start: 2018-03-09 | End: 2018-03-09 | Stop reason: HOSPADM

## 2018-03-09 RX ORDER — ONDANSETRON 2 MG/ML
4 INJECTION INTRAMUSCULAR; INTRAVENOUS AS NEEDED
Status: DISCONTINUED | OUTPATIENT
Start: 2018-03-09 | End: 2018-03-09 | Stop reason: HOSPADM

## 2018-03-09 RX ORDER — FENTANYL CITRATE 50 UG/ML
INJECTION, SOLUTION INTRAMUSCULAR; INTRAVENOUS AS NEEDED
Status: DISCONTINUED | OUTPATIENT
Start: 2018-03-09 | End: 2018-03-09 | Stop reason: HOSPADM

## 2018-03-09 RX ORDER — ONDANSETRON 2 MG/ML
INJECTION INTRAMUSCULAR; INTRAVENOUS AS NEEDED
Status: DISCONTINUED | OUTPATIENT
Start: 2018-03-09 | End: 2018-03-09 | Stop reason: HOSPADM

## 2018-03-09 RX ORDER — HYDROCODONE BITARTRATE AND ACETAMINOPHEN 5; 325 MG/1; MG/1
1 TABLET ORAL AS NEEDED
Status: DISCONTINUED | OUTPATIENT
Start: 2018-03-09 | End: 2018-03-09 | Stop reason: HOSPADM

## 2018-03-09 RX ORDER — PROPOFOL 10 MG/ML
INJECTION, EMULSION INTRAVENOUS AS NEEDED
Status: DISCONTINUED | OUTPATIENT
Start: 2018-03-09 | End: 2018-03-09 | Stop reason: HOSPADM

## 2018-03-09 RX ORDER — MIDAZOLAM HYDROCHLORIDE 1 MG/ML
1 INJECTION, SOLUTION INTRAMUSCULAR; INTRAVENOUS AS NEEDED
Status: DISCONTINUED | OUTPATIENT
Start: 2018-03-09 | End: 2018-03-09 | Stop reason: HOSPADM

## 2018-03-09 RX ORDER — KETOROLAC TROMETHAMINE 30 MG/ML
INJECTION, SOLUTION INTRAMUSCULAR; INTRAVENOUS AS NEEDED
Status: DISCONTINUED | OUTPATIENT
Start: 2018-03-09 | End: 2018-03-09 | Stop reason: HOSPADM

## 2018-03-09 RX ORDER — DEXAMETHASONE SODIUM PHOSPHATE 4 MG/ML
INJECTION, SOLUTION INTRA-ARTICULAR; INTRALESIONAL; INTRAMUSCULAR; INTRAVENOUS; SOFT TISSUE AS NEEDED
Status: DISCONTINUED | OUTPATIENT
Start: 2018-03-09 | End: 2018-03-09 | Stop reason: HOSPADM

## 2018-03-09 RX ORDER — ROCURONIUM BROMIDE 10 MG/ML
INJECTION, SOLUTION INTRAVENOUS AS NEEDED
Status: DISCONTINUED | OUTPATIENT
Start: 2018-03-09 | End: 2018-03-09 | Stop reason: HOSPADM

## 2018-03-09 RX ORDER — ACETAMINOPHEN 500 MG
1000 TABLET ORAL
COMMUNITY
End: 2018-03-09

## 2018-03-09 RX ORDER — CEFAZOLIN SODIUM/WATER 2 G/20 ML
2 SYRINGE (ML) INTRAVENOUS ONCE
Status: COMPLETED | OUTPATIENT
Start: 2018-03-09 | End: 2018-03-09

## 2018-03-09 RX ORDER — LIDOCAINE HYDROCHLORIDE 10 MG/ML
0.1 INJECTION, SOLUTION EPIDURAL; INFILTRATION; INTRACAUDAL; PERINEURAL AS NEEDED
Status: DISCONTINUED | OUTPATIENT
Start: 2018-03-09 | End: 2018-03-09 | Stop reason: HOSPADM

## 2018-03-09 RX ORDER — FENTANYL CITRATE 50 UG/ML
50 INJECTION, SOLUTION INTRAMUSCULAR; INTRAVENOUS AS NEEDED
Status: DISCONTINUED | OUTPATIENT
Start: 2018-03-09 | End: 2018-03-09 | Stop reason: HOSPADM

## 2018-03-09 RX ORDER — IBUPROFEN 800 MG/1
800 TABLET ORAL
Qty: 30 TAB | Refills: 1 | Status: SHIPPED | OUTPATIENT
Start: 2018-03-09

## 2018-03-09 RX ORDER — HYDROMORPHONE HYDROCHLORIDE 1 MG/ML
0.5 INJECTION, SOLUTION INTRAMUSCULAR; INTRAVENOUS; SUBCUTANEOUS
Status: DISCONTINUED | OUTPATIENT
Start: 2018-03-09 | End: 2018-03-09 | Stop reason: HOSPADM

## 2018-03-09 RX ADMIN — FENTANYL CITRATE 100 MCG: 50 INJECTION, SOLUTION INTRAMUSCULAR; INTRAVENOUS at 11:21

## 2018-03-09 RX ADMIN — DIPHENHYDRAMINE HYDROCHLORIDE 12.5 MG: 50 INJECTION, SOLUTION INTRAMUSCULAR; INTRAVENOUS at 14:32

## 2018-03-09 RX ADMIN — FENTANYL CITRATE 25 MCG: 50 INJECTION, SOLUTION INTRAMUSCULAR; INTRAVENOUS at 13:44

## 2018-03-09 RX ADMIN — MEPERIDINE HYDROCHLORIDE 12.5 MG: 50 INJECTION, SOLUTION INTRAMUSCULAR; INTRAVENOUS; SUBCUTANEOUS at 13:24

## 2018-03-09 RX ADMIN — LABETALOL HYDROCHLORIDE 5 MG: 5 INJECTION, SOLUTION INTRAVENOUS at 11:59

## 2018-03-09 RX ADMIN — SODIUM CHLORIDE, SODIUM LACTATE, POTASSIUM CHLORIDE, AND CALCIUM CHLORIDE: 600; 310; 30; 20 INJECTION, SOLUTION INTRAVENOUS at 12:36

## 2018-03-09 RX ADMIN — ONDANSETRON 4 MG: 2 INJECTION INTRAMUSCULAR; INTRAVENOUS at 12:26

## 2018-03-09 RX ADMIN — PROPOFOL 60 MG: 10 INJECTION, EMULSION INTRAVENOUS at 12:15

## 2018-03-09 RX ADMIN — FENTANYL CITRATE 100 MCG: 50 INJECTION, SOLUTION INTRAMUSCULAR; INTRAVENOUS at 11:45

## 2018-03-09 RX ADMIN — MIDAZOLAM HYDROCHLORIDE 2 MG: 1 INJECTION, SOLUTION INTRAMUSCULAR; INTRAVENOUS at 11:12

## 2018-03-09 RX ADMIN — PROPOFOL 150 MG: 10 INJECTION, EMULSION INTRAVENOUS at 11:21

## 2018-03-09 RX ADMIN — LIDOCAINE HYDROCHLORIDE 60 MG: 20 INJECTION, SOLUTION EPIDURAL; INFILTRATION; INTRACAUDAL; PERINEURAL at 11:21

## 2018-03-09 RX ADMIN — KETOROLAC TROMETHAMINE 30 MG: 30 INJECTION, SOLUTION INTRAMUSCULAR; INTRAVENOUS at 12:27

## 2018-03-09 RX ADMIN — DEXAMETHASONE SODIUM PHOSPHATE 4 MG: 4 INJECTION, SOLUTION INTRA-ARTICULAR; INTRALESIONAL; INTRAMUSCULAR; INTRAVENOUS; SOFT TISSUE at 11:33

## 2018-03-09 RX ADMIN — SODIUM CHLORIDE, SODIUM LACTATE, POTASSIUM CHLORIDE, AND CALCIUM CHLORIDE 25 ML/HR: 600; 310; 30; 20 INJECTION, SOLUTION INTRAVENOUS at 09:51

## 2018-03-09 RX ADMIN — Medication 2 G: at 11:16

## 2018-03-09 RX ADMIN — FENTANYL CITRATE 50 MCG: 50 INJECTION, SOLUTION INTRAMUSCULAR; INTRAVENOUS at 12:25

## 2018-03-09 RX ADMIN — FENTANYL CITRATE 25 MCG: 50 INJECTION, SOLUTION INTRAMUSCULAR; INTRAVENOUS at 13:39

## 2018-03-09 RX ADMIN — MEPERIDINE HYDROCHLORIDE 12.5 MG: 50 INJECTION, SOLUTION INTRAMUSCULAR; INTRAVENOUS; SUBCUTANEOUS at 13:18

## 2018-03-09 RX ADMIN — PROPOFOL 200 MCG/KG/MIN: 10 INJECTION, EMULSION INTRAVENOUS at 11:25

## 2018-03-09 RX ADMIN — FENTANYL CITRATE 25 MCG: 50 INJECTION, SOLUTION INTRAMUSCULAR; INTRAVENOUS at 13:33

## 2018-03-09 RX ADMIN — DIPHENHYDRAMINE HYDROCHLORIDE 12.5 MG: 50 INJECTION, SOLUTION INTRAMUSCULAR; INTRAVENOUS at 13:54

## 2018-03-09 RX ADMIN — ACETAMINOPHEN 1000 MG: 10 INJECTION, SOLUTION INTRAVENOUS at 11:58

## 2018-03-09 RX ADMIN — ROCURONIUM BROMIDE 40 MG: 10 INJECTION, SOLUTION INTRAVENOUS at 11:21

## 2018-03-09 NOTE — OP NOTES
Operative Note    Patient ID:   Name: Rehan Demarco    Medical Record Number: [id number]    YOB: 1975    Preoperative Diagnosis: ENDOMETRIOSIS     Postoperative Diagnosis: ENDOMETRIOSIS     Surgeon:  Bharti Lin MD     Assistant:  OR assistant    Anesthesia: General    Procedure: Total Laparoscopic Hysterectomy, left salpingectomy    Findings: normal uterus and normal ovaries. Absent right tube, adhesion of bladder to uterus. Estimated Blood Loss: 100    Drains: none    Pathology /Specimens:     ID Type Source Tests Collected by Time Destination   1 : UTERUS, CERVIX & LEFT FALLOPIAN TUBE Preservative Uterus  Bharti Lin MD 3/9/2018 1226 Pathology       DVT Prophylaxis: SCD Hose    Antibiotic Prophylaxis: Ancef    After understanding the risks, benefits, and alternatives to the proposed procedure, the patient was taken to the operating room, where she was administered general anesthesia in the supine position. She was then placed in the dorsal lithotomy position and prepped and draped in usual sterile fashion. A xAdare uterine manipulator is placed and the balloon inflated with air. A blackwell catheter is also placed. Gloves are changed and attention is turned to the abdomen. An umbilical incision is made, and access is gained using the optiview technique with a 5 mm trocar. Pneumoperitoneum is obtained and intraabdominal placement is verified. There was no bleeding and no evidence of injury to the bowel. 5 mm incisions were then made in the left and right lower quadrants and 5 mm ports placed in each of those under direct visualization. Findings were noted as above. The articulating EnSeal was used. The ureters were identified on either side. On the left hand side, the tube and uteroovarian ligament was clamped and divided using the EnSeal. The dissection was then taken down through the round ligament. The anterior peritoneum was incised at the midline.   The bladder was dissected off the lower uterine segment and cervix. Posterior peritoneum was taken down, skeletonizing the uterine arteries. The uterine arteries were clamped, coagulated and cut across the ascending branches using EnSeal. Cardinal ligament was developed. The same steps were followed on the right side. The fornices of the vagina are identified via the VCare cup. Colpotomy was made with the Harmonic Hook and carried around the VCare cup. When the specimen was free, it was delivered through the vagina. The vaginal cuff was then closed with a running suture of 2-0 Stratafix with care taken to incorporate the angles of the cuff on each side. Hemostasis was achieved. The left mesosalpinx is divided with the EnSeal to remove the fallopian tube which is then withdrawn through the trocar. The pelvis was irrigated with copious amounts of saline and suctioned away. Hemostasis was assured. The left and right lower trocars were removed under direct visualization. Once the pneumoperitoneum was completely reduced and hemostasis is still effective, the final trocar was removed. Skin of all incisions was closed with 4-0 Monocryl in a subcuticular closure. 0.5% Marcaine with epinephrine is injected at each incision site. Dermabond was applied to the skin. All sponge, lap, needle, and instrument counts were correct times two. Subsequently, the patient was cleaned up, returned to the supine position, awakened, and taken to the recovery room in stable condition.      Signed By: Neelima Gaston MD

## 2018-03-09 NOTE — IP AVS SNAPSHOT
[address] [facility] 
[phone] Patient: Alize Cardoza MRN: [id number] NBJ:[id number] About your hospitalization You were admitted on:  March 9, 2018 You last received care in the:  [facility] PACU You were discharged on:  March 9, 2018 Why you were hospitalized Your primary diagnosis was:  Not on File Follow-up Information Follow up With Details Comments Contact Info Freida Lester MD   [address] [id number] [phone] [place] MD In 6 weeks  [address] [facility] 
[phone] Discharge Orders None A check deion indicates which time of day the medication should be taken. My Medications START taking these medications Instructions Each Dose to Equal  
 Morning Noon Evening Bedtime  
 oxyCODONE-acetaminophen 5-325 mg per tablet Commonly known as:  PERCOCET Your last dose was: Your next dose is: Take 1-2 Tabs by mouth every six (6) hours as needed for Pain. Max Daily Amount: 8 Tabs. 1-2 Tab CHANGE how you take these medications Instructions Each Dose to Equal  
 Morning Noon Evening Bedtime  
 dilTIAZem  mg ER capsule Commonly known as:  CARDIZEM CD What changed:  when to take this Your last dose was: Your next dose is: Take 1 Cap by mouth daily. 300 mg  
    
   
   
   
  
 escitalopram oxalate 5 mg tablet Commonly known as:  Hope Bong What changed:  See the new instructions. Your last dose was: Your next dose is: TAKE 1 TABLET BY MOUTH EVERY DAY  
     
   
   
   
  
 ibuprofen 800 mg tablet Commonly known as:  MOTRIN What changed:   
- medication strength 
- how much to take - when to take this 
- reasons to take this Your last dose was: Your next dose is: Take 1 Tab by mouth every eight (8) hours as needed for Pain. 800 mg CONTINUE taking these medications Instructions Each Dose to Equal  
 Morning Noon Evening Bedtime BENADRYL 25 mg capsule Generic drug:  diphenhydrAMINE Your last dose was: Your next dose is: Take 25 mg by mouth as needed. Alternating with Claritan. 25 mg CLARITIN 10 mg tablet Generic drug:  loratadine Your last dose was: Your next dose is: Take 10 mg by mouth nightly. 10 mg DETROL PO Your last dose was: Your next dose is: Take 4 mg by mouth nightly. 4 mg NEXPLANON 68 mg Impl Generic drug:  etonogestrel Your last dose was: Your next dose is:    
   
   
 by SubDERmal route. STOP taking these medications   
 butalbital-acetaminophen-caffeine -40 mg per tablet Commonly known as:  FIORICET, ESGIC  
   
  
 TYLENOL EXTRA STRENGTH 500 mg tablet Generic drug:  acetaminophen Where to Get Your Medications Information on where to get these meds will be given to you by the nurse or doctor. ! Ask your nurse or doctor about these medications  
  ibuprofen 800 mg tablet  
 oxyCODONE-acetaminophen 5-325 mg per tablet Discharge Instructions After Care Instructions for Your Laparoscopic Hysterectomy 1. When you are discharged from the hospital, you should plan to eat a bland, light diet for the first 1-2 days. Avoid spicy or greasy foods and high roughage foods such as salads and raw vegetables (these can cause gas and bloating). Drink plenty of non-caffeinated fluids (water is best). You may resume your usual diet gradually. 2. Avoid heavy lifting or straining. Gradually increase your activity. First try walking and doing light activity around the house. Most women can return to work 2-3 weeks after the procedure. You should speak with your doctor about your individual return to work plan and any other restrictions. 3. You may take showers. Please do not take baths until you are seen for your post-operative visit. 4. It is not unusual to experience some discomfort under your ribs and/or soreness of your neck and shoulders over the first 1-2 days. This is due to the gas used in your abdomen during the surgery to help your doctor see your pelvic organs. This gas gets naturally reabsorbed. The right shoulder is often more sore than the left. 5. It is not unusual to have vaginal spotting lasting up to 2 weeks off and on. Some women do not experience any bleeding at all. You should call your doctor immediately if you ever experience heavy vaginal bleeding or gushes of any liquid coming from your vagina that does not seem like the amount you would expect for your normal vaginal discharge. 6. Bruises may appear around the incisions and will gradually resolve as they heal. 
 
7. There are several ways that your incisions may be closed. Most of these do not require the removal of any sutures. Some patients may have skin glue, Dermabond, placed over the incisions. Do not try to peel this off, it will gradually wear off on its own. Other patients will have small paper strips placed over the incisions. Allow these to fall off on their own or your doctor will remove them. No other special dressing is required. 8. Call the office at [phone] to report any of the following problems: abdominal pain that is increasing in severity despite using the prescribed pain medication, heavy vaginal bleeding, drainage or separation of your incision(s), temperature greater than 100.4 or persistent nausea and vomiting. 9. You should be seen in the office following your surgery.  Call for an appointment if this has not already been arranged. At this appointment, the findings noted at the time of your procedure and /or pathology reports will be reviewed. {Medication reconciliation information is now added to the patient's AVS automatically when it is printed. There is no need to use this SmartLink in discharge instructions. Highlight this text and delete it to clear this message} DISCHARGE SUMMARY from Nurse PATIENT INSTRUCTIONS: 
 
After general anesthesia or intravenous sedation, for 24 hours or while taking prescription Narcotics: · Limit your activities · Do not drive and operate hazardous machinery · Do not make important personal or business decisions · Do  not drink alcoholic beverages · If you have not urinated within 8 hours after discharge, please contact your surgeon on call. Report the following to your surgeon: 
· Excessive pain, swelling, redness or odor of or around the surgical area · Temperature over 100.5 · Nausea and vomiting lasting longer than 4 hours or if unable to take medications · Any signs of decreased circulation or nerve impairment to extremity: change in color, persistent  numbness, tingling, coldness or increase pain · Any questions What to do at Home: *  Please give a list of your current medications to your Primary Care Provider. *  Please update this list whenever your medications are discontinued, doses are 
    changed, or new medications (including over-the-counter products) are added. *  Please carry medication information at all times in case of emergency situations. These are general instructions for a healthy lifestyle: No smoking/ No tobacco products/ Avoid exposure to second hand smoke Surgeon General's Warning:  Quitting smoking now greatly reduces serious risk to your health. Obesity, smoking, and sedentary lifestyle greatly increases your risk for illness A healthy diet, regular physical exercise & weight monitoring are important for maintaining a healthy lifestyle You may be retaining fluid if you have a history of heart failure or if you experience any of the following symptoms:  Weight gain of 3 pounds or more overnight or 5 pounds in a week, increased swelling in our hands or feet or shortness of breath while lying flat in bed. Please call your doctor as soon as you notice any of these symptoms; do not wait until your next office visit. Recognize signs and symptoms of STROKE: 
 
 
? soup 
? broth 
?  toast  
? crackers ? applesauce 
? bananas  
? mashed potatoes, 
? soft or scrambled eggs 
? oatmeal 
?  jello It is important to eat when taking your pain medication. This will help to prevent nausea. If possible, please try to time your meals with your medications. It is very important to stay hydrated following surgery. Sip fluids frequently while awake. Avoid acidic drinks such as citrus juices and soda for 24 hours. Carbonated beverages may cause bloating and gas. Acceptable fluids include: 
 
? water (flavor packets may add variety) ? coffee or tea (in moderation) ? Gatorade ? Leia Cleverly ? apple juice 
? cranberry juice You are encouraged to cough and deep breathe every hour when awake. This will help to prevent respiratory complications following anesthesia. You may want to hug a pillow when coughing and sneezing to add additional support to the surgical area and to decrease discomfort if you had abdominal or chest surgery. If you are discharged home with support stockings, you may remove them after 24 hours. Support stockings are used to help prevent blood clots in the legs following surgery. Please take time to review all of your Home Care Instructions and Medication Information sheets provided in your discharge packet. If you have any questions, please contact your surgeons office. Thank you. How to Care for Your Wound After Its Treated With DERMABOND* Topical Skin Adhesive DERMABOND* Topical Skin Adhesive (2-octyl cyanoacrylate) is a sterile, liquid skin adhesive 
that holds wound edges together. The film will usually remain in place for 5 to 10 days, then 
naturally fall off your skin. The following will answer some of your questions and provide instructions for proper care for your 
wound while it is healing: CHECK WOUND APPEARANCE 
 Some swelling, redness, and pain are common with all wounds and normally will go away as the 
wound heals. If swelling, redness, or pain increases or if the wound feels warm to the touch, 
contact a doctor. Also contact a doctor if the wound edges reopen or separate. REPLACE BANDAGES 
 If your wound is bandaged, keep the bandage dry.  Replace the dressing daily until the adhesive film has fallen off or if the 
bandage should become wet, unless otherwise instructed by your 
physician.  When changing the dressing, do not place tape directly over the DERMABOND adhesive film, because removing the tape later may also 
remove the film. AVOID TOPICAL MEDICATIONS  Do not apply liquid or ointment medications or any other product to your wound while the DERMABOND adhesive film is in place. These may loosen the film before your wound is healed. KEEP WOUND DRY AND PROTECTED  You may occasionally and briefly wet your wound in the shower or bath. Do not soak or scrub 
your wound, do not swim, and avoid periods of heavy perspiration until the DERMABOND 
adhesive has naturally fallen off. After showering or bathing, gently blot your wound dry with a 
soft towel. If a protective dressing is being used, apply a fresh, dry bandage, being sure to keep 
the tape off the DERMABOND adhesive film.  Apply a clean, dry bandage over the wound if necessary to protect it.  Protect your wound from injury until the skin has had sufficient time to heal. 
 Do not scratch, rub, or pick at the DERMABOND adhesive film. This may loosen the film before your wound is healed.  Protect the wound from prolonged exposure to sunlight or tanning lamps while the film is in 
place. If you have any questions or concerns about this product, please consult your doctor. *Trademark ©[employer] 2002 Narcotic-Analgesic/Acetaminophen (Percocet, Norco, Lorcet HD, Lortab 10/325) - (By mouth) Why this medicine is used:  
Relieves pain. Contact a nurse or doctor right away if you have: 
· Extreme weakness, shallow breathing, slow heartbeat · Severe confusion, lightheadedness, dizziness, fainting · Yellow skin or eyes, dark urine or pale stools · Severe constipation, severe stomach pain, nausea, vomiting, loss of appetite · Sweating or cold, clammy skin Common side effects: · Mild constipation, nausea, vomiting · Sleepiness, tiredness · Itching, rash © 2017 [facility] Information is for End User's use only and may not be sold, redistributed or otherwise used for commercial purposes. Ibuprofen (Advil, Advil Children's, Motrin, Children's Ibuprofen) - (By mouth) Why this medicine is used:  
Treats pain and fever. This medicine is an NSAID. Contact a nurse or doctor right away if you have: 
· Change in how much or how often you urinate · Severe stomach pain, vomiting blood, bloody or black tarry stools · Swelling in your hands, ankles, or feet; rapid weight gain Common side effects: 
· Constipation, diarrhea, gas, mild upset stomach · Ringing in your ears, dizziness, headache © 2017 [facility] Information is for End User's use only and may not be sold, redistributed or otherwise used for commercial purposes. Introducing [facility]! Dear Karoline Covert: 
Thank you for requesting a [employer] account. Our records indicate that you have previously registered for a [employer] account but its currently inactive. Please call our [employer] support line at [phone]. Additional Information If you have questions, please visit the Frequently Asked Questions section of the MyChart website at [email or web address] "[email or web address] Remember, MyChart is NOT to be used for urgent needs. For medical emergencies, dial 911. Now available from your iPhone and Android! Providers Seen During Your Hospitalization Provider Specialty Primary office phone Michael Herrera [address] [place] [address] [phone] Your Primary Care Physician (PCP) Primary Care Physician Office Phone Office Fax Havendionna Trevino [phone] You are allergic to the following Allergen Reactions Codeine Itching Codeine Itching Lipitor (Atorvastatin) Hives Recent Documentation Height Weight BMI OB Status Smoking Status 1.676 m 64.6 kg 22.99 kg/m2 Having regular periods Never Smoker Emergency Contacts Name Discharge Info Relation Home Work Mobile Toby Claudio DECLINED CAREGIVER [4] Parent [1] [phone] Rizwan Raygoza DECLINED CAREGIVER [4] Parent [1] [phone] Roxane King  Parent [1] [phone] Patient Belongings The following personal items are in your possession at time of discharge: 
  Dental Appliances: None  Visual Aid: Glasses      Home Medications: None   Jewelry: None  Clothing: Undergarments, Pants, Shirt, Socks, Slippers    Other Valuables: Eyeglasses  Personal Items Sent to Safe: declined Please provide this summary of care documentation to your next provider. Signatures-by signing, you are acknowledging that this After Visit Summary has been reviewed with you and you have received a copy. Patient Signature:  ____________________________________________________________ Date:  ____________________________________________________________  
  
Barrett Zepeda Provider Signature:  ____________________________________________________________ Date:  ____________________________________________________________

## 2018-03-09 NOTE — ANESTHESIA PREPROCEDURE EVALUATION
Anesthetic History   No history of anesthetic complications  PONV          Review of Systems / Medical History  Patient summary reviewed, nursing notes reviewed and pertinent labs reviewed    Pulmonary  Within defined limits                 Neuro/Psych   Within defined limits      Psychiatric history     Cardiovascular  Within defined limits          Dysrhythmias       Exercise tolerance: >4 METS     GI/Hepatic/Renal  Within defined limits              Endo/Other  Within defined limits           Other Findings              Physical Exam    Airway  Mallampati: II  TM Distance: 4 - 6 cm  Neck ROM: normal range of motion   Mouth opening: Normal     Cardiovascular  Regular rate and rhythm,  S1 and S2 normal,  no murmur, click, rub, or gallop             Dental  No notable dental hx       Pulmonary  Breath sounds clear to auscultation               Abdominal  GI exam deferred       Other Findings            Anesthetic Plan    ASA: 2  Anesthesia type: general    Monitoring Plan: BIS      Induction: Intravenous  Anesthetic plan and risks discussed with: Patient

## 2018-03-09 NOTE — DISCHARGE INSTRUCTIONS
After Care Instructions for Your Laparoscopic Hysterectomy      1. When you are discharged from the hospital, you should plan to eat a bland, light diet for the first 1-2 days. Avoid spicy or greasy foods and high roughage foods such as salads and raw vegetables (these can cause gas and bloating). Drink plenty of non-caffeinated fluids (water is best). You may resume your usual diet gradually. 2. Avoid heavy lifting or straining. Gradually increase your activity. First try walking and doing light activity around the house. Most women can return to work 2-3 weeks after the procedure. You should speak with your doctor about your individual return to work plan and any other restrictions. 3. You may take showers. Please do not take baths until you are seen for your post-operative visit. 4. It is not unusual to experience some discomfort under your ribs and/or soreness of your neck and shoulders over the first 1-2 days. This is due to the gas used in your abdomen during the surgery to help your doctor see your pelvic organs. This gas gets naturally reabsorbed. The right shoulder is often more sore than the left. 5. It is not unusual to have vaginal spotting lasting up to 2 weeks off and on. Some women do not experience any bleeding at all. You should call your doctor immediately if you ever experience heavy vaginal bleeding or gushes of any liquid coming from your vagina that does not seem like the amount you would expect for your normal vaginal discharge. 6. Bruises may appear around the incisions and will gradually resolve as they heal.    7. There are several ways that your incisions may be closed. Most of these do not require the removal of any sutures. Some patients may have skin glue, Dermabond, placed over the incisions. Do not try to peel this off, it will gradually wear off on its own. Other patients will have small paper strips placed over the incisions.   Allow these to fall off on their own or your doctor will remove them. No other special dressing is required. 8. Call the office at [phone] to report any of the following problems: abdominal pain that is increasing in severity despite using the prescribed pain medication, heavy vaginal bleeding, drainage or separation of your incision(s), temperature greater than 100.4 or persistent nausea and vomiting. 9. You should be seen in the office following your surgery. Call for an appointment if this has not already been arranged. At this appointment, the findings noted at the time of your procedure and /or pathology reports will be reviewed. DISCHARGE SUMMARY from Nurse    PATIENT INSTRUCTIONS:    After general anesthesia or intravenous sedation, for 24 hours or while taking prescription Narcotics:  · Limit your activities  · Do not drive and operate hazardous machinery  · Do not make important personal or business decisions  · Do  not drink alcoholic beverages  · If you have not urinated within 8 hours after discharge, please contact your surgeon on call. Report the following to your surgeon:  · Excessive pain, swelling, redness or odor of or around the surgical area  · Temperature over 100.5  · Nausea and vomiting lasting longer than 4 hours or if unable to take medications  · Any signs of decreased circulation or nerve impairment to extremity: change in color, persistent  numbness, tingling, coldness or increase pain  · Any questions    What to do at Home:    A common side effect of anesthesia following surgery is nausea and/or vomiting. In order to decrease symptoms, it is wise to avoid foods that are high in fat, greasy foods, milk products, and spicy foods for the first 24 hours.     Acceptable foods for the first 24 hours following surgery include but are not limited to:     soup   broth    toast    crackers    applesauce    bananas    mashed potatoes,   soft or scrambled eggs   oatmeal    jello    It is important to eat when taking your pain medication. This will help to prevent nausea. If possible, please try to time your meals with your medications. It is very important to stay hydrated following surgery. Sip fluids frequently while awake. Avoid acidic drinks such as citrus juices and soda for 24 hours. Carbonated beverages may cause bloating and gas. Acceptable fluids include:    - water (flavor packets may add variety)  - coffee or tea (in moderation)  - Gatorade  - Sebastian-aid  - apple juice  - cranberry juice    You are encouraged to cough and deep breathe every hour when awake. This will help to prevent respiratory complications following anesthesia. You may want to hug a pillow when coughing and sneezing to add additional support to the surgical area and to decrease discomfort if you had abdominal or chest surgery. If you are discharged home with support stockings, you may remove them after 24 hours. Support stockings are used to help prevent blood clots in the legs following surgery. Please take time to review all of your Home Care Instructions and Medication Information sheets provided in your discharge packet. If you have any questions, please contact your surgeons office. Thank you. *  Please give a list of your current medications to your Primary Care Provider. *  Please update this list whenever your medications are discontinued, doses are      changed, or new medications (including over-the-counter products) are added. *  Please carry medication information at all times in case of emergency situations. These are general instructions for a healthy lifestyle:    No smoking/ No tobacco products/ Avoid exposure to second hand smoke  Surgeon General's Warning:  Quitting smoking now greatly reduces serious risk to your health.     Obesity, smoking, and sedentary lifestyle greatly increases your risk for illness    A healthy diet, regular physical exercise & weight monitoring are important for maintaining a healthy lifestyle    You may be retaining fluid if you have a history of heart failure or if you experience any of the following symptoms:  Weight gain of 3 pounds or more overnight or 5 pounds in a week, increased swelling in our hands or feet or shortness of breath while lying flat in bed. Please call your doctor as soon as you notice any of these symptoms; do not wait until your next office visit. Recognize signs and symptoms of STROKE:    F-face looks uneven    A-arms unable to move or move unevenly    S-speech slurred or non-existent    T-time-call 911 as soon as signs and symptoms begin-DO NOT go       Back to bed or wait to see if you get better-TIME IS BRAIN. Warning Signs of HEART ATTACK     Call 911 if you have these symptoms:   Chest discomfort. Most heart attacks involve discomfort in the center of the chest that lasts more than a few minutes, or that goes away and comes back. It can feel like uncomfortable pressure, squeezing, fullness, or pain.  Discomfort in other areas of the upper body. Symptoms can include pain or discomfort in one or both arms, the back, neck, jaw, or stomach.  Shortness of breath with or without chest discomfort.  Other signs may include breaking out in a cold sweat, nausea, or lightheadedness. Don't wait more than five minutes to call 911 - MINUTES MATTER! Fast action can save your life. Calling 911 is almost always the fastest way to get lifesaving treatment. Emergency Medical Services staff can begin treatment when they arrive -- up to an hour sooner than if someone gets to the hospital by car. The discharge information has been reviewed with the patient and caregiver. The patient and caregiver verbalized understanding.   Discharge medications reviewed with the patient and caregiver and appropriate educational materials and side effects teaching were provided. ___________________________________________________________________________________________________________________________________      A common side effect of anesthesia following surgery is nausea and/or vomiting. In order to decrease symptoms, it is wise to avoid foods that are high in fat, greasy foods, milk products, and spicy foods for the first 24 hours. Acceptable foods for the first 24 hours following surgery include but are not limited to:     soup   broth    toast    crackers    applesauce    bananas    mashed potatoes,   soft or scrambled eggs   oatmeal    jello    It is important to eat when taking your pain medication. This will help to prevent nausea. If possible, please try to time your meals with your medications. It is very important to stay hydrated following surgery. Sip fluids frequently while awake. Avoid acidic drinks such as citrus juices and soda for 24 hours. Carbonated beverages may cause bloating and gas. Acceptable fluids include:    - water (flavor packets may add variety)  - coffee or tea (in moderation)  - Gatorade  - Sebastian-aid  - apple juice  - cranberry juice    You are encouraged to cough and deep breathe every hour when awake. This will help to prevent respiratory complications following anesthesia. You may want to hug a pillow when coughing and sneezing to add additional support to the surgical area and to decrease discomfort if you had abdominal or chest surgery. If you are discharged home with support stockings, you may remove them after 24 hours. Support stockings are used to help prevent blood clots in the legs following surgery. Please take time to review all of your Home Care Instructions and Medication Information sheets provided in your discharge packet. If you have any questions, please contact your surgeons office. Thank you.             How to Care for Your Wound After Its Treated With  DERMABOND* Topical Skin Adhesive  DERMABOND* Topical Skin Adhesive (2-octyl cyanoacrylate) is a sterile, liquid skin adhesive  that holds wound edges together. The film will usually remain in place for 5 to 10 days, then  naturally fall off your skin. The following will answer some of your questions and provide instructions for proper care for your  wound while it is healing:    CHECK WOUND APPEARANCE   Some swelling, redness, and pain are common with all wounds and normally will go away as the  wound heals. If swelling, redness, or pain increases or if the wound feels warm to the touch,  contact a doctor. Also contact a doctor if the wound edges reopen or separate. REPLACE BANDAGES   If your wound is bandaged, keep the bandage dry.  Replace the dressing daily until the adhesive film has fallen off or if the  bandage should become wet, unless otherwise instructed by your  physician.  When changing the dressing, do not place tape directly over the  DERMABOND adhesive film, because removing the tape later may also  remove the film. AVOID TOPICAL MEDICATIONS   Do not apply liquid or ointment medications or any other product to your wound while the  DERMABOND adhesive film is in place. These may loosen the film before your wound is healed. KEEP WOUND DRY AND PROTECTED   You may occasionally and briefly wet your wound in the shower or bath. Do not soak or scrub  your wound, do not swim, and avoid periods of heavy perspiration until the DERMABOND  adhesive has naturally fallen off. After showering or bathing, gently blot your wound dry with a  soft towel. If a protective dressing is being used, apply a fresh, dry bandage, being sure to keep  the tape off the DERMABOND adhesive film.  Apply a clean, dry bandage over the wound if necessary to protect it.  Protect your wound from injury until the skin has had sufficient time to heal.   Do not scratch, rub, or pick at the DERMABOND adhesive film. This may loosen the film before  your wound is healed.    Protect the wound from prolonged exposure to sunlight or tanning lamps while the film is in  place. If you have any questions or concerns about this product, please consult your doctor. *Trademark ©[employer] 2002     Narcotic-Analgesic/Acetaminophen (Percocet, [address] [facility], Lorcet HD, Lortab 10/325) - (By mouth)   Why this medicine is used:   Relieves pain. Contact a nurse or doctor right away if you have:  · Extreme weakness, shallow breathing, slow heartbeat  · Severe confusion, lightheadedness, dizziness, fainting  · Yellow skin or eyes, dark urine or pale stools  · Severe constipation, severe stomach pain, nausea, vomiting, loss of appetite  · Sweating or cold, clammy skin     Common side effects:  · Mild constipation, nausea, vomiting  · Sleepiness, tiredness  · Itching, rash  © 2017 [facility] Information is for End User's use only and may not be sold, redistributed or otherwise used for commercial purposes. Ibuprofen (Advil, Advil Children's, Motrin, Children's Ibuprofen) - (By mouth)   Why this medicine is used:   Treats pain and fever. This medicine is an NSAID. Contact a nurse or doctor right away if you have:  · Change in how much or how often you urinate  · Severe stomach pain, vomiting blood, bloody or black tarry stools  · Swelling in your hands, ankles, or feet; rapid weight gain     Common side effects:  · Constipation, diarrhea, gas, mild upset stomach  · Ringing in your ears, dizziness, headache  © 2017 [address] is for End User's use only and may not be sold, redistributed or otherwise used for commercial purposes.

## 2018-03-09 NOTE — PERIOP NOTES
For D/C home,vss, denies pain or nausea, tolerating liquids. Trocar sites on lower abdomin x 4 C/D/I derma bond intact. Reviwed discharge instructions, Rx & F/U care with pt. & caregiver with good understanding. Pt. Voided, IV d/'d and discharged home.

## 2018-03-09 NOTE — ANESTHESIA POSTPROCEDURE EVALUATION
Post-Anesthesia Evaluation and Assessment    Patient: Clare Yousif MRN: [id number]  SSN: [id number]    YOB: 1975  Age: 36 y.o. Sex: female       Cardiovascular Function/Vital Signs  Visit Vitals    /89    Pulse 74    Temp 36.9 °C (98.5 °F)    Resp 16    Ht 5' 6\" (1.676 m)    Wt 64.6 kg (142 lb 6.7 oz)    SpO2 97%    BMI 22.99 kg/m2       Patient is status post general anesthesia for Procedure(s):  TOTAL LAPAROSCOPIC HYSTERECTOMY, LEFT  SALPINGOECTOMY, NEXPLANON REMOVAL . Nausea/Vomiting: None    Postoperative hydration reviewed and adequate. Pain:  Pain Scale 1: Numeric (0 - 10) (03/09/18 1415)  Pain Intensity 1: 3 (03/09/18 1415)   Managed    Neurological Status:   Neuro (WDL): Exceptions to WDL (03/09/18 1303)  Neuro  Neurologic State: Drowsy; Eyes open to voice (03/09/18 1303)  Orientation Level: Oriented to person (03/09/18 1303)  Cognition: Follows commands (03/09/18 1303)  Speech: Clear (03/09/18 1303)  LUE Motor Response: Purposeful (03/09/18 1303)  LLE Motor Response: Purposeful (03/09/18 1303)  RUE Motor Response: Purposeful (03/09/18 1303)  RLE Motor Response: Purposeful (03/09/18 1303)   At baseline    Mental Status and Level of Consciousness: Arousable    Pulmonary Status:   O2 Device: Room air (03/09/18 1330)   Adequate oxygenation and airway patent    Complications related to anesthesia: None    Post-anesthesia assessment completed.  No concerns    Signed By: Judit De La Garza MD     March 9, 2018

## 2018-03-09 NOTE — PERIOP NOTES
Handoff Report from Operating Room to PACU    Report received from Leslie Baxter RN and Ariana Wallace CRNA regarding Darrold Lalit. Surgeon(s):  Chucky Allison MD  And Procedure(s) (LRB):  TOTAL LAPAROSCOPIC HYSTERECTOMY, LEFT  SALPINGOECTOMY, NEXPLANON REMOVAL  (N/A)  confirmed   with dressings discussed. Anesthesia type, drugs, patient history, complications, estimated blood loss, vital signs, intake and output, and last pain medication, lines and temperature were reviewed.

## 2018-07-25 DIAGNOSIS — I47.1 PAROXYSMAL SVT (SUPRAVENTRICULAR TACHYCARDIA) (HCC): ICD-10-CM

## 2018-07-25 RX ORDER — DILTIAZEM HYDROCHLORIDE 300 MG/1
300 CAPSULE, COATED, EXTENDED RELEASE ORAL DAILY
Qty: 30 CAP | Refills: 11 | Status: SHIPPED | OUTPATIENT
Start: 2018-07-25 | End: 2019-08-02 | Stop reason: SDUPTHER

## 2018-09-28 DIAGNOSIS — F07.81 POST CONCUSSION SYNDROME: ICD-10-CM

## 2018-09-28 DIAGNOSIS — G44.309 POST-CONCUSSION HEADACHE: ICD-10-CM

## 2018-09-28 RX ORDER — ESCITALOPRAM OXALATE 5 MG/1
TABLET ORAL
Qty: 30 TAB | Refills: 0 | Status: SHIPPED | OUTPATIENT
Start: 2018-09-28

## 2018-09-28 NOTE — TELEPHONE ENCOUNTER
No future appointments. Last Appointment My Department:  11/29/16    Please advise of refill below. Requested Prescriptions     Pending Prescriptions Disp Refills    escitalopram oxalate (LEXAPRO) 5 mg tablet [Pharmacy Med Name: ESCITALOPRAM 5 MG TABLET] 30 Tab 0     Sig: Take 1 by mouth daily.  MUST MAKE APPOINTMENT FOR FURTHER REFILLS OR GET FURTHER FROM PCP

## 2019-08-02 DIAGNOSIS — I47.1 PAROXYSMAL SVT (SUPRAVENTRICULAR TACHYCARDIA) (HCC): ICD-10-CM

## 2019-08-04 RX ORDER — DILTIAZEM HYDROCHLORIDE 300 MG/1
CAPSULE, COATED, EXTENDED RELEASE ORAL
Qty: 30 CAP | Refills: 11 | Status: SHIPPED | OUTPATIENT
Start: 2019-08-04 | End: 2020-08-11 | Stop reason: SDUPTHER

## 2020-08-11 DIAGNOSIS — I47.1 PAROXYSMAL SVT (SUPRAVENTRICULAR TACHYCARDIA) (HCC): ICD-10-CM

## 2020-08-11 RX ORDER — DILTIAZEM HYDROCHLORIDE 300 MG/1
CAPSULE, COATED, EXTENDED RELEASE ORAL
Qty: 30 CAP | Refills: 0 | Status: SHIPPED | OUTPATIENT
Start: 2020-08-11 | End: 2020-09-04

## 2020-08-11 RX ORDER — DILTIAZEM HYDROCHLORIDE 300 MG/1
CAPSULE, COATED, EXTENDED RELEASE ORAL
Qty: 90 CAP | Refills: 3 | OUTPATIENT
Start: 2020-08-11

## 2020-08-11 NOTE — TELEPHONE ENCOUNTER
dilTIAZem CD (CARDIZEM CD) 300 mg ER capsule    Send to [facility] pharmacy [address] at [address]    Pt is scheduled to see Dr. Meli De Anda on 9/10, her medication will run out before then, that is the earliest the pt can be scheduled      thanks

## 2020-09-10 ENCOUNTER — OFFICE VISIT (OUTPATIENT)
Dept: CARDIOLOGY CLINIC | Age: 45
End: 2020-09-10
Payer: COMMERCIAL

## 2020-09-10 VITALS
BODY MASS INDEX: 23.53 KG/M2 | WEIGHT: 146.4 LBS | DIASTOLIC BLOOD PRESSURE: 82 MMHG | HEIGHT: 66 IN | SYSTOLIC BLOOD PRESSURE: 119 MMHG | RESPIRATION RATE: 16 BRPM | HEART RATE: 90 BPM | OXYGEN SATURATION: 99 %

## 2020-09-10 DIAGNOSIS — I47.1 PAROXYSMAL SVT (SUPRAVENTRICULAR TACHYCARDIA) (HCC): Primary | ICD-10-CM

## 2020-09-10 PROCEDURE — 99213 OFFICE O/P EST LOW 20 MIN: CPT | Performed by: INTERNAL MEDICINE

## 2020-09-10 PROCEDURE — 93000 ELECTROCARDIOGRAM COMPLETE: CPT | Performed by: INTERNAL MEDICINE

## 2020-09-10 RX ORDER — DILTIAZEM HYDROCHLORIDE 300 MG/1
300 CAPSULE, COATED, EXTENDED RELEASE ORAL DAILY
Qty: 30 CAP | Refills: 11 | Status: SHIPPED | OUTPATIENT
Start: 2020-09-10 | End: 2021-11-01 | Stop reason: SDUPTHER

## 2020-09-10 RX ORDER — CYCLOBENZAPRINE HCL 10 MG
TABLET ORAL
COMMUNITY
Start: 2020-08-11

## 2020-09-10 NOTE — LETTER
9/10/20 Patient: Anitra Stroud YOB: 1975 Date of Visit: 9/10/2020 Phil Pepe MD 
[address] [facility] [address] [phone] VIA In Basket Dear Phil Pepe MD, Thank you for referring Ms. Ann Leon to [facility] for evaluation. My notes for this consultation are attached. If you have questions, please do not hesitate to call me. I look forward to following your patient along with you. Sincerely, Nicol Gaspar MD

## 2020-09-10 NOTE — PROGRESS NOTES
Identified pt with two pt identifiers(name and ). Reviewed record in preparation for visit and have obtained necessary documentation. Chief Complaint   Patient presents with    Follow-up           Visit Vitals  /82 (BP 1 Location: Left arm, BP Patient Position: Sitting)   Pulse (!) 105   Resp 16   Ht 5' 6\" (1.676 m)   Wt 146 lb 6.4 oz (66.4 kg)   LMP 2018   SpO2 99%   BMI 23.63 kg/m²     Pain Scale: /10    Coordination of Care Questionnaire:  :   1. Have you been to the ER, urgent care clinic since your last visit? Hospitalized since your last visit? No    2. Have you seen or consulted any other health care providers outside of the [address] [place] [address] since your last visit? Include any pap smears or colon screening.  No

## 2020-09-10 NOTE — PROGRESS NOTES
Subjective/HPI:     Francis Gómez is a 38 y.o. female is here for long f/u, last seen . She reports she will have occasional brief palpitations. Nothing of duration or intensity that is concerning to her. She feels the Diltiazem is doing well for her. The patient denies chest pain/ shortness of breath, orthopnea, PND, LE edema, palpitations, syncope, presyncope or fatigue. PCP Provider  Cliffton Buerger., MD  Past Medical History:   Diagnosis Date    Hypercholesteremia     Ill-defined condition     endometriosis    Nausea & vomiting     PSVT (paroxysmal supraventricular tachycardia) (HCC)     Psychiatric disorder     anxiety and depression      Past Surgical History:   Procedure Laterality Date    HX GYN       x1    HX GYN  1999    right salpingectomy    HX HEENT      tubes left ear    HX ORTHOPAEDIC      right mensicus and ACL     Allergies   Allergen Reactions    Codeine Itching    Codeine Itching    Lipitor [Atorvastatin] Hives      Family History   Problem Relation Age of Onset    Neuropathy Mother     Diabetes Mother     Cancer Father         prostate    Heart Disease Father     Stroke Father     High Cholesterol Father     Cancer Maternal Grandmother         pancreatic    Heart Disease Paternal Grandmother     Stroke Paternal Grandmother     Anxiety Child     Attention Deficit Hyperactivity Disorder Child     Other Child         autism and dyslexia      Current Outpatient Medications   Medication Sig    cyclobenzaprine (FLEXERIL) 10 mg tablet TAKE 1 TABLET BY MOUTH EVERY NIGHT AS NEEDED    dilTIAZem ER (CARDIZEM CD) 300 mg capsule TAKE 1 CAPSULE BY MOUTH EVERY DAY    escitalopram oxalate (LEXAPRO) 5 mg tablet Take 1 by mouth daily. MUST MAKE APPOINTMENT FOR FURTHER REFILLS OR GET FURTHER FROM PCP    ibuprofen (MOTRIN) 800 mg tablet Take 1 Tab by mouth every eight (8) hours as needed for Pain.     diphenhydrAMINE (BENADRYL) 25 mg capsule Take 25 mg by mouth as needed. Alternating with Claritan.  loratadine (CLARITIN) 10 mg tablet Take 10 mg by mouth nightly. No current facility-administered medications for this visit. Vitals:    09/10/20 1450   BP: 119/82   Pulse: 90   Resp: 16   SpO2: 99%   Weight: 146 lb 6.4 oz (66.4 kg)   Height: 5' 6\" (1.676 m)     Social History     Socioeconomic History    Marital status:      Spouse name: Not on file    Number of children: Not on file    Years of education: Not on file    Highest education level: Not on file   Occupational History    Not on file   Social Needs    Financial resource strain: Not on file    Food insecurity     Worry: Not on file     Inability: Not on file    Transportation needs     Medical: Not on file     Non-medical: Not on file   Tobacco Use    Smoking status: Never Smoker    Smokeless tobacco: Never Used   Substance and Sexual Activity    Alcohol use: Yes     Comment: occasionally    Drug use: No    Sexual activity: Not on file   Lifestyle    Physical activity     Days per week: Not on file     Minutes per session: Not on file    Stress: Not on file   Relationships    Social connections     Talks on phone: Not on file     Gets together: Not on file     Attends [employer] service: Not on file     Active member of club or organization: Not on file     Attends meetings of clubs or organizations: Not on file     Relationship status: Not on file    Intimate partner violence     Fear of current or ex partner: Not on file     Emotionally abused: Not on file     Physically abused: Not on file     Forced sexual activity: Not on file   Other Topics Concern    Not on file   Social History Narrative    ** Merged History Encounter **            I have reviewed the nurses notes, vitals, problem list, allergy list, medical history, family, social history and medications. Review of Symptoms:    General: Pt denies excessive weight gain or loss.  Pt is able to conduct ADL's  HEENT: Denies blurred vision, headaches, epistaxis and difficulty swallowing. Respiratory: Denies shortness of breath, ANDRES, wheezing or stridor. Cardiovascular: Denies precordial pain, +occasional palpitations, denies edema or PND  Gastrointestinal: Denies poor appetite, indigestion, abdominal pain or blood in stool  Musculoskeletal: Denies pain or swelling from muscles or joints  Neurologic: Denies tremor, paresthesias, or sensory motor disturbance  Skin: Denies rash, itching or texture change. Physical Exam:      General: Well developed, in no acute distress, cooperative and alert  HEENT: No carotid bruits, no JVD, trach is midline. Neck Supple, PEERL, EOM intact. Heart:  Normal S1/S2 negative S3 or S4. Regular, no murmur, gallop or rub.   Respiratory: Clear bilaterally x 4, no wheezing or rales  Abdomen:   Soft, non-tender, no masses, bowel sounds are active.   Extremities:  No edema, normal cap refill, no cyanosis, atraumatic. Neuro: A&Ox3, speech clear, gait stable. Skin: Skin color is normal. No rashes or lesions.  Non diaphoretic  Vascular: 2+ pulses symmetric in all extremities    Cardiographics    ECG: SR    Results for orders placed or performed in visit on 08/24/12   HOLTER MONITOR, 24 HOURS    Narrative    ECG Monitor/24 hours, Complete    Reason for Holter Monitor   palpitations    Heartbeat    Slowest 49  Average 87  Fastest  181      Results:   Underlying Rhythm: Normal sinus rhythm      Atrial Arrhythmias: premature atrial contractions; rare, PSVT             AV Conduction: normal    Ventricular Arrhythmias: none     ST Segment Analysis:normal     Symptom Correlation:  PSVT    Comment:   PSVT up to 180 BPM.     Messi Albright MD                    Cardiology Labs:  No results found for: CHOL, CHOLX, CHLST, CHOLV, 990554, HDL, HDLP, LDL, LDLC, DLDLP, TGLX, TRIGL, TRIGP, CHHD, CHHDX    Lab Results   Component Value Date/Time    Sodium 141 07/10/2017 08:34 AM    Potassium 4.5 07/10/2017 08:34 AM    Chloride 101 07/10/2017 08:34 AM    CO2 27 07/10/2017 08:34 AM    Anion gap 4 (L) 08/18/2012 04:09 PM    Glucose 84 07/10/2017 08:34 AM    BUN 13 07/10/2017 08:34 AM    Creatinine 0.94 07/10/2017 08:34 AM    BUN/Creatinine ratio 14 07/10/2017 08:34 AM    GFR est AA 87 07/10/2017 08:34 AM    GFR est non-AA 75 07/10/2017 08:34 AM    Calcium 9.2 07/10/2017 08:34 AM    Bilirubin, total 0.6 08/18/2012 04:09 PM    Alk. phosphatase 110 08/18/2012 04:09 PM    Protein, total 8.4 (H) 08/18/2012 04:09 PM    Albumin 4.6 08/18/2012 04:09 PM    Globulin 3.8 08/18/2012 04:09 PM    A-G Ratio 1.2 08/18/2012 04:09 PM    ALT (SGPT) 37 08/18/2012 04:09 PM           Assessment:     Assessment:     Diagnoses and all orders for this visit:    1. Paroxysmal SVT (supraventricular tachycardia) (HCC)  -     AMB POC EKG ROUTINE W/ 12 LEADS, INTER & REP        ICD-10-CM ICD-9-CM    1. Paroxysmal SVT (supraventricular tachycardia) (HCC)  I47.1 427.0 AMB POC EKG ROUTINE W/ 12 LEADS, INTER & REP     Orders Placed This Encounter    AMB POC EKG ROUTINE W/ 12 LEADS, INTER & REP     Order Specific Question:   Reason for Exam:     Answer:   screen    cyclobenzaprine (FLEXERIL) 10 mg tablet     Sig: TAKE 1 TABLET BY MOUTH EVERY NIGHT AS NEEDED        Plan:     Paroxysmal SVT: Reports occasional palpitations that are not worsening in intensity, frequency or duration. EKG today SR. 7/17 event monitor showed symptomatic single PVCs, normal exercise stress test 2012, echo in 2016 EF 55-60% without valvular disease. Tolerating higher dose of diltiazem. Labs done by PCP with normal kidneys/electrolytes reportedly last year. F/U in 1 year. Netta Jones NP       [facility]    9/10/2020         Patient seen, examined by me personally. Plan discussed as detailed. Agree with note as outlined by  NP. I confirm findings in history and physical exam. No additional findings noted. Agree with plan as outlined above. Dwayne Ness MD

## 2021-11-01 ENCOUNTER — OFFICE VISIT (OUTPATIENT)
Dept: CARDIOLOGY CLINIC | Age: 46
End: 2021-11-01
Payer: COMMERCIAL

## 2021-11-01 VITALS
SYSTOLIC BLOOD PRESSURE: 122 MMHG | OXYGEN SATURATION: 98 % | WEIGHT: 155.5 LBS | HEIGHT: 66 IN | RESPIRATION RATE: 16 BRPM | DIASTOLIC BLOOD PRESSURE: 80 MMHG | HEART RATE: 68 BPM | BODY MASS INDEX: 24.99 KG/M2

## 2021-11-01 DIAGNOSIS — I47.1 PAROXYSMAL SVT (SUPRAVENTRICULAR TACHYCARDIA) (HCC): Primary | ICD-10-CM

## 2021-11-01 PROCEDURE — 93000 ELECTROCARDIOGRAM COMPLETE: CPT | Performed by: NURSE PRACTITIONER

## 2021-11-01 PROCEDURE — 99214 OFFICE O/P EST MOD 30 MIN: CPT | Performed by: NURSE PRACTITIONER

## 2021-11-01 RX ORDER — DILTIAZEM HYDROCHLORIDE 300 MG/1
300 CAPSULE, COATED, EXTENDED RELEASE ORAL DAILY
Qty: 90 CAPSULE | Refills: 3 | Status: SHIPPED | OUTPATIENT
Start: 2021-11-01

## 2021-11-01 NOTE — PROGRESS NOTES
Jean Tariq, FNP-BC    Subjective/HPI:     Sean Espinosa is a 51 y.o. female is here for routine f/u. She has a PMHx of PSVT. Doing well. Stays busy. 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 Denies complaints of chest pains, dizziness, orthopnea, PND or edema. Has palpitation symptoms \"once in a blue moon\". Current Outpatient Medications on File Prior to Visit   Medication Sig Dispense Refill    cyclobenzaprine (FLEXERIL) 10 mg tablet TAKE 1 TABLET BY MOUTH EVERY NIGHT AS NEEDED      dilTIAZem ER (CARDIZEM CD) 300 mg capsule Take 1 Cap by mouth daily. 30 Cap 11    escitalopram oxalate (LEXAPRO) 5 mg tablet Take 1 by mouth daily. MUST MAKE APPOINTMENT FOR FURTHER REFILLS OR GET FURTHER FROM PCP (Patient taking differently: Take 20 mg by mouth daily.) 30 Tab 0    ibuprofen (MOTRIN) 800 mg tablet Take 1 Tab by mouth every eight (8) hours as needed for Pain. 30 Tab 1    diphenhydrAMINE (BENADRYL) 25 mg capsule Take 25 mg by mouth as needed. Alternating with Claritan.  loratadine (CLARITIN) 10 mg tablet Take 10 mg by mouth nightly. No current facility-administered medications on file prior to visit. Review of Symptoms:    Review of Systems   Constitutional: Negative for chills, fever and weight loss. HENT: Negative for nosebleeds. Eyes: Negative for blurred vision and double vision. Respiratory: Negative for cough, shortness of breath and wheezing. Cardiovascular: Negative for chest pain, palpitations, orthopnea, leg swelling and PND. Skin: Negative for rash. Neurological: Negative for dizziness and loss of consciousness. Physical Exam:      General: Well developed, in no acute distress, cooperative and alert  Heart:  reg rate and rhythm; normal S1/S2; no murmurs, no gallops or rubs. Respiratory: Clear bilaterally x 4, no wheezing or rales  Extremities:  Normal cap refill, no cyanosis, atraumatic.   No edema. Vascular: 2+ pulses symmetric in all extremities    Vitals:    11/01/21 1624   BP: 122/80   BP 1 Location: Right arm   BP Patient Position: Sitting   BP Cuff Size: Adult   Pulse: 68   Resp: 16   Height: 5' 6\" (1.676 m)   Weight: 155 lb 8 oz (70.5 kg)   SpO2: 98%       ECG done today shows sinus rhythm     Assessment:       ICD-10-CM ICD-9-CM    1. Paroxysmal SVT (supraventricular tachycardia) ([facility])  I47.1 427.0 AMB POC EKG ROUTINE W/ 12 LEADS, INTER & REP        Plan:     1.  Paroxysmal SVT (supraventricular tachycardia) ([facility])  Event monitor done 7/2017 with PVCs associated with symptoms, no SVT noted  Echo done 4/2016 with preserved LVEF 55-60% with no significant valuvlar pathology  Continue CCB for symptom/rate control    F/u with Dr. Mil Mcfarlane in 1 year    Michelle Saavedra NP

## 2021-11-01 NOTE — PROGRESS NOTES
Chief Complaint   Patient presents with    SVT     Annual Follow Up; Denies Cardiac Symptoms     Visit Vitals  /80 (BP 1 Location: Right arm, BP Patient Position: Sitting, BP Cuff Size: Adult)   Pulse 68   Resp 16   Ht 5' 6\" (1.676 m)   Wt 155 lb 8 oz (70.5 kg)   SpO2 98%   BMI 25.10 kg/m²       1. Have you been to the ER, urgent care clinic since your last visit? Hospitalized since your last visit? No    2. Have you seen or consulted any other health care providers outside of the [address] [place] [address] since your last visit? Include any pap smears or colon screening.  No

## 2022-08-08 ENCOUNTER — HOSPITAL ENCOUNTER (INPATIENT)
Age: 47
LOS: 1 days | Discharge: HOME | DRG: 918 | End: 2022-08-10
Attending: EMERGENCY MEDICINE | Admitting: INTERNAL MEDICINE
Payer: COMMERCIAL
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
## 2022-08-08 LAB
ALBUMIN SERPL-MCNC: 3.5 G/DL (ref 3.5–5)
ALBUMIN/GLOB SERPL: 0.9 {RATIO} (ref 1.1–2.2)
ALP SERPL-CCNC: 106 U/L (ref 45–117)
ALT SERPL-CCNC: 22 U/L (ref 12–78)
ANION GAP SERPL CALC-SCNC: 7 MMOL/L (ref 5–15)
APTT PPP: 28.1 SEC (ref 22.1–31)
AST SERPL-CCNC: 22 U/L (ref 15–37)
BASOPHILS # BLD: 0 K/UL (ref 0–0.1)
BASOPHILS NFR BLD: 1 % (ref 0–1)
BILIRUB SERPL-MCNC: 0.4 MG/DL (ref 0.2–1)
BUN SERPL-MCNC: 10 MG/DL (ref 6–20)
BUN/CREAT SERPL: 10 (ref 12–20)
CALCIUM SERPL-MCNC: 8.8 MG/DL (ref 8.5–10.1)
CHLORIDE SERPL-SCNC: 106 MMOL/L (ref 97–108)
CK SERPL-CCNC: 70 U/L (ref 26–192)
CO2 SERPL-SCNC: 26 MMOL/L (ref 21–32)
CREAT SERPL-MCNC: 1 MG/DL (ref 0.55–1.02)
DIFFERENTIAL METHOD BLD: ABNORMAL
EOSINOPHIL # BLD: 0.2 K/UL (ref 0–0.4)
EOSINOPHIL NFR BLD: 3 % (ref 0–7)
ERYTHROCYTE [DISTWIDTH] IN BLOOD BY AUTOMATED COUNT: 12.7 % (ref 11.5–14.5)
FIBRINOGEN PPP-MCNC: 389 MG/DL (ref 200–475)
GLOBULIN SER CALC-MCNC: 3.7 G/DL (ref 2–4)
GLUCOSE SERPL-MCNC: 98 MG/DL (ref 65–100)
HCT VFR BLD AUTO: 44.7 % (ref 35–47)
HGB BLD-MCNC: 15.4 G/DL (ref 11.5–16)
IMM GRANULOCYTES # BLD AUTO: 0 K/UL (ref 0–0.04)
IMM GRANULOCYTES NFR BLD AUTO: 1 % (ref 0–0.5)
INR PPP: 1 (ref 0.9–1.1)
LYMPHOCYTES # BLD: 1.7 K/UL (ref 0.8–3.5)
LYMPHOCYTES NFR BLD: 21 % (ref 12–49)
MCH RBC QN AUTO: 32.8 PG (ref 26–34)
MCHC RBC AUTO-ENTMCNC: 34.5 G/DL (ref 30–36.5)
MCV RBC AUTO: 95.3 FL (ref 80–99)
MONOCYTES # BLD: 0.4 K/UL (ref 0–1)
MONOCYTES NFR BLD: 5 % (ref 5–13)
NEUTS SEG # BLD: 5.7 K/UL (ref 1.8–8)
NEUTS SEG NFR BLD: 69 % (ref 32–75)
NRBC # BLD: 0 K/UL (ref 0–0.01)
NRBC BLD-RTO: 0 PER 100 WBC
PLATELET # BLD AUTO: 266 K/UL (ref 150–400)
PMV BLD AUTO: 10.5 FL (ref 8.9–12.9)
POTASSIUM SERPL-SCNC: 3.8 MMOL/L (ref 3.5–5.1)
PROT SERPL-MCNC: 7.2 G/DL (ref 6.4–8.2)
PROTHROMBIN TIME: 10.1 SEC (ref 9–11.1)
RBC # BLD AUTO: 4.69 M/UL (ref 3.8–5.2)
SODIUM SERPL-SCNC: 139 MMOL/L (ref 136–145)
THERAPEUTIC RANGE,PTTT: NORMAL SECS (ref 58–77)
WBC # BLD AUTO: 8.1 K/UL (ref 3.6–11)

## 2022-08-08 PROCEDURE — 96365 THER/PROPH/DIAG IV INF INIT: CPT

## 2022-08-08 PROCEDURE — G0378 HOSPITAL OBSERVATION PER HR: HCPCS

## 2022-08-08 PROCEDURE — 96366 THER/PROPH/DIAG IV INF ADDON: CPT

## 2022-08-08 PROCEDURE — 85025 COMPLETE CBC W/AUTO DIFF WBC: CPT

## 2022-08-08 PROCEDURE — 36415 COLL VENOUS BLD VENIPUNCTURE: CPT

## 2022-08-08 PROCEDURE — 74011250636 HC RX REV CODE- 250/636: Performed by: INTERNAL MEDICINE

## 2022-08-08 PROCEDURE — 82550 ASSAY OF CK (CPK): CPT

## 2022-08-08 PROCEDURE — 80053 COMPREHEN METABOLIC PANEL: CPT

## 2022-08-08 PROCEDURE — 74011000258 HC RX REV CODE- 258: Performed by: INTERNAL MEDICINE

## 2022-08-08 PROCEDURE — 74011250636 HC RX REV CODE- 250/636: Performed by: EMERGENCY MEDICINE

## 2022-08-08 PROCEDURE — 74011000250 HC RX REV CODE- 250: Performed by: EMERGENCY MEDICINE

## 2022-08-08 PROCEDURE — 85610 PROTHROMBIN TIME: CPT

## 2022-08-08 PROCEDURE — 96367 TX/PROPH/DG ADDL SEQ IV INF: CPT

## 2022-08-08 PROCEDURE — 96376 TX/PRO/DX INJ SAME DRUG ADON: CPT

## 2022-08-08 PROCEDURE — 96375 TX/PRO/DX INJ NEW DRUG ADDON: CPT

## 2022-08-08 PROCEDURE — 99285 EMERGENCY DEPT VISIT HI MDM: CPT

## 2022-08-08 PROCEDURE — 85730 THROMBOPLASTIN TIME PARTIAL: CPT

## 2022-08-08 PROCEDURE — 74011250637 HC RX REV CODE- 250/637: Performed by: EMERGENCY MEDICINE

## 2022-08-08 PROCEDURE — 85384 FIBRINOGEN ACTIVITY: CPT

## 2022-08-08 PROCEDURE — 74011250637 HC RX REV CODE- 250/637: Performed by: INTERNAL MEDICINE

## 2022-08-08 RX ORDER — FENTANYL CITRATE/PF 1500MCG/30
PATIENT CONTROLLED ANALGESIA SYRINGE INTRAVENOUS
Status: DISCONTINUED | OUTPATIENT
Start: 2022-08-08 | End: 2022-08-09

## 2022-08-08 RX ORDER — SODIUM CHLORIDE 0.9 % (FLUSH) 0.9 %
5-40 SYRINGE (ML) INJECTION AS NEEDED
Status: DISCONTINUED | OUTPATIENT
Start: 2022-08-08 | End: 2022-08-08 | Stop reason: SDUPTHER

## 2022-08-08 RX ORDER — DILTIAZEM HYDROCHLORIDE 300 MG/1
300 CAPSULE, COATED, EXTENDED RELEASE ORAL DAILY
Status: DISCONTINUED | OUTPATIENT
Start: 2022-08-09 | End: 2022-08-10 | Stop reason: HOSPADM

## 2022-08-08 RX ORDER — ONDANSETRON 2 MG/ML
4 INJECTION INTRAMUSCULAR; INTRAVENOUS
Status: DISCONTINUED | OUTPATIENT
Start: 2022-08-08 | End: 2022-08-10 | Stop reason: HOSPADM

## 2022-08-08 RX ORDER — ACETAMINOPHEN 325 MG/1
650 TABLET ORAL
Status: DISCONTINUED | OUTPATIENT
Start: 2022-08-08 | End: 2022-08-08 | Stop reason: SDUPTHER

## 2022-08-08 RX ORDER — DIPHENHYDRAMINE HCL 25 MG
25 CAPSULE ORAL
Status: DISCONTINUED | OUTPATIENT
Start: 2022-08-08 | End: 2022-08-10 | Stop reason: HOSPADM

## 2022-08-08 RX ORDER — FENTANYL CITRATE 50 UG/ML
100 INJECTION, SOLUTION INTRAMUSCULAR; INTRAVENOUS
Status: COMPLETED | OUTPATIENT
Start: 2022-08-08 | End: 2022-08-08

## 2022-08-08 RX ORDER — HYDROMORPHONE HYDROCHLORIDE 1 MG/ML
1 INJECTION, SOLUTION INTRAMUSCULAR; INTRAVENOUS; SUBCUTANEOUS
Status: COMPLETED | OUTPATIENT
Start: 2022-08-08 | End: 2022-08-08

## 2022-08-08 RX ORDER — SODIUM CHLORIDE 0.9 % (FLUSH) 0.9 %
5-40 SYRINGE (ML) INJECTION AS NEEDED
Status: DISCONTINUED | OUTPATIENT
Start: 2022-08-08 | End: 2022-08-10 | Stop reason: HOSPADM

## 2022-08-08 RX ORDER — ESCITALOPRAM OXALATE 10 MG/1
10 TABLET ORAL DAILY
Status: DISCONTINUED | OUTPATIENT
Start: 2022-08-09 | End: 2022-08-10 | Stop reason: HOSPADM

## 2022-08-08 RX ORDER — POLYETHYLENE GLYCOL 3350 17 G/17G
17 POWDER, FOR SOLUTION ORAL DAILY PRN
Status: DISCONTINUED | OUTPATIENT
Start: 2022-08-08 | End: 2022-08-10 | Stop reason: HOSPADM

## 2022-08-08 RX ORDER — ONDANSETRON 4 MG/1
4 TABLET, ORALLY DISINTEGRATING ORAL
Status: DISCONTINUED | OUTPATIENT
Start: 2022-08-08 | End: 2022-08-10 | Stop reason: HOSPADM

## 2022-08-08 RX ORDER — SODIUM CHLORIDE 0.9 % (FLUSH) 0.9 %
5-40 SYRINGE (ML) INJECTION EVERY 8 HOURS
Status: DISCONTINUED | OUTPATIENT
Start: 2022-08-08 | End: 2022-08-10 | Stop reason: HOSPADM

## 2022-08-08 RX ORDER — HYDROXYZINE 25 MG/1
50 TABLET, FILM COATED ORAL
Status: COMPLETED | OUTPATIENT
Start: 2022-08-08 | End: 2022-08-08

## 2022-08-08 RX ORDER — DIPHENHYDRAMINE HYDROCHLORIDE 50 MG/ML
25 INJECTION, SOLUTION INTRAMUSCULAR; INTRAVENOUS
Status: COMPLETED | OUTPATIENT
Start: 2022-08-08 | End: 2022-08-08

## 2022-08-08 RX ORDER — ENOXAPARIN SODIUM 100 MG/ML
40 INJECTION SUBCUTANEOUS DAILY
Status: DISCONTINUED | OUTPATIENT
Start: 2022-08-09 | End: 2022-08-10 | Stop reason: HOSPADM

## 2022-08-08 RX ORDER — ONDANSETRON 2 MG/ML
4 INJECTION INTRAMUSCULAR; INTRAVENOUS
Status: COMPLETED | OUTPATIENT
Start: 2022-08-08 | End: 2022-08-08

## 2022-08-08 RX ORDER — ACETAMINOPHEN 650 MG/1
650 SUPPOSITORY RECTAL
Status: DISCONTINUED | OUTPATIENT
Start: 2022-08-08 | End: 2022-08-09

## 2022-08-08 RX ORDER — HYDROXYZINE 25 MG/1
25 TABLET, FILM COATED ORAL
Status: DISCONTINUED | OUTPATIENT
Start: 2022-08-08 | End: 2022-08-10 | Stop reason: HOSPADM

## 2022-08-08 RX ORDER — MIDAZOLAM HYDROCHLORIDE 1 MG/ML
2 INJECTION, SOLUTION INTRAMUSCULAR; INTRAVENOUS
Status: COMPLETED | OUTPATIENT
Start: 2022-08-08 | End: 2022-08-08

## 2022-08-08 RX ORDER — ACETAMINOPHEN 325 MG/1
650 TABLET ORAL
Status: DISCONTINUED | OUTPATIENT
Start: 2022-08-08 | End: 2022-08-09

## 2022-08-08 RX ORDER — SODIUM CHLORIDE 0.9 % (FLUSH) 0.9 %
5-40 SYRINGE (ML) INJECTION EVERY 8 HOURS
Status: DISCONTINUED | OUTPATIENT
Start: 2022-08-08 | End: 2022-08-08 | Stop reason: SDUPTHER

## 2022-08-08 RX ORDER — CYCLOBENZAPRINE HCL 10 MG
10 TABLET ORAL
Status: DISCONTINUED | OUTPATIENT
Start: 2022-08-08 | End: 2022-08-10 | Stop reason: HOSPADM

## 2022-08-08 RX ORDER — PRAVASTATIN SODIUM 10 MG/1
20 TABLET ORAL
Status: DISCONTINUED | OUTPATIENT
Start: 2022-08-08 | End: 2022-08-10 | Stop reason: HOSPADM

## 2022-08-08 RX ADMIN — SODIUM CHLORIDE, PRESERVATIVE FREE 10 ML: 5 INJECTION INTRAVENOUS at 21:42

## 2022-08-08 RX ADMIN — ONDANSETRON 4 MG: 2 INJECTION INTRAMUSCULAR; INTRAVENOUS at 11:39

## 2022-08-08 RX ADMIN — HYDROXYZINE HYDROCHLORIDE 50 MG: 25 TABLET, FILM COATED ORAL at 13:36

## 2022-08-08 RX ADMIN — AMPICILLIN SODIUM AND SULBACTAM SODIUM 3 G: 2; 1 INJECTION, POWDER, FOR SOLUTION INTRAMUSCULAR; INTRAVENOUS at 15:44

## 2022-08-08 RX ADMIN — OVINE CROTALIDAE VENOMS IMMUNE FAB 2 VIAL: 1; 1; 1; 1 INJECTION, POWDER, LYOPHILIZED, FOR SOLUTION INTRAVENOUS at 17:57

## 2022-08-08 RX ADMIN — HYDROMORPHONE HYDROCHLORIDE 1 MG: 1 INJECTION, SOLUTION INTRAMUSCULAR; INTRAVENOUS; SUBCUTANEOUS at 10:26

## 2022-08-08 RX ADMIN — ONDANSETRON 4 MG: 2 INJECTION INTRAMUSCULAR; INTRAVENOUS at 10:26

## 2022-08-08 RX ADMIN — FENTANYL CITRATE: 50 INJECTION, SOLUTION INTRAMUSCULAR; INTRAVENOUS at 14:45

## 2022-08-08 RX ADMIN — AMPICILLIN SODIUM AND SULBACTAM SODIUM 3 G: 2; 1 INJECTION, POWDER, FOR SOLUTION INTRAMUSCULAR; INTRAVENOUS at 21:42

## 2022-08-08 RX ADMIN — HYDROXYZINE HYDROCHLORIDE 25 MG: 25 TABLET, FILM COATED ORAL at 15:43

## 2022-08-08 RX ADMIN — OVINE CROTALIDAE VENOMS IMMUNE FAB 4 VIAL: 1; 1; 1; 1 INJECTION, POWDER, LYOPHILIZED, FOR SOLUTION INTRAVENOUS at 11:41

## 2022-08-08 RX ADMIN — DIPHENHYDRAMINE HYDROCHLORIDE 25 MG: 25 CAPSULE ORAL at 22:35

## 2022-08-08 RX ADMIN — FENTANYL CITRATE 100 MCG: 50 INJECTION, SOLUTION INTRAMUSCULAR; INTRAVENOUS at 11:39

## 2022-08-08 RX ADMIN — PRAVASTATIN SODIUM 20 MG: 10 TABLET ORAL at 21:41

## 2022-08-08 RX ADMIN — DIPHENHYDRAMINE HYDROCHLORIDE 25 MG: 50 INJECTION, SOLUTION INTRAMUSCULAR; INTRAVENOUS at 11:53

## 2022-08-08 RX ADMIN — FENTANYL CITRATE 100 MCG: 50 INJECTION, SOLUTION INTRAMUSCULAR; INTRAVENOUS at 13:36

## 2022-08-08 RX ADMIN — MIDAZOLAM HYDROCHLORIDE 2 MG: 1 INJECTION, SOLUTION INTRAMUSCULAR; INTRAVENOUS at 12:19

## 2022-08-08 NOTE — PROGRESS NOTES
TRANSFER - IN REPORT:    Verbal report received from [address] (name) on Dawn Rodrigues  being received from ED (unit) for routine progression of care      Report consisted of patients Situation, Background, Assessment and   Recommendations(SBAR). Information from the following report(s) SBAR, Kardex, Intake/Output, and MAR was reviewed with the receiving nurse. Opportunity for questions and clarification was provided. Assessment completed upon patients arrival to unit and care assumed. End of Shift Note    Bedside shift change report given to Seble Mcfarland (oncoming nurse) by Ashley Castro RN (offgoing nurse). Report included the following information SBAR, Kardex, Intake/Output, and MAR    Shift worked:  7am-7pm     Shift summary and any significant changes:     Pt was welcomed to unit and made comfortable. Pt PCA pump was verified with unit charge RN. Pt complained of pain during the shift and RN encouraged PCA pump.       Concerns for physician to address:       Zone phone for oncoming shift:   21 [phone]

## 2022-08-08 NOTE — H&P
Hospitalist Admission Note    NAME: Janel Norton   :  1975   MRN:  [id number]     Date/Time:  2022 1:39 PM    Patient PCP: Zora Helton MD  ______________________________________________________________________  Given the patient's current clinical presentation, I have a high level of concern for decompensation if discharged from the emergency department. Complex decision making was performed, which includes reviewing the patient's available past medical records, laboratory results, and x-ray films. My assessment of this patient's clinical condition and my plan of care is as follows. Assessment / Plan:    71-year-old female with past medical history of SVT, fibromyalgia presented to the emergency department status post copperhead snake bite on right little finger. #Snakebite. Right hand little finger swelling. ?  Cellulitis  #History of SVT. #Fibromyalgia. Admit to observation. Continue with IV fluid. Allergic to morphine. Dilaudid with itching. Started on fentanyl for pain control. Poison control was contacted. Given CroFab dose. Can be repeated if needed. Hand elevation. Monitor for signs of worsening swelling/infection. Added unasyn for coverage. If doesn't improve in 24 hours, will need hand surgeon evaluation.     -History of SVT. Continue with Cardizem.    -Fibromyalgia. Continue with citalopram/Flexeril as needed. -Hyperlipidemia. Continue with pravastatin 20 mg at bedtime. Code Status: Full code. Surrogate Decision Maker:    DVT Prophylaxis: Lovenox. GI Prophylaxis: not indicated    Baseline: Independent for activities of daily living. Subjective:   CHIEF COMPLAINT: Snakebite with right hand swelling. HISTORY OF PRESENT ILLNESS:     Tom Mendoza is a 51 y.o.  female who presents with snakebite this morning with right hand swelling.   As per patient she had a past medical history of fibromyalgia, hyperlipidemia, paroxysmal SVT who presented to the emergency department with a snakebite on little finger of right hand. As per patient she was working in her backyard and was bitten by a snake with likely copperhead. Patient complaining of right hand swelling with bruise over little finger. Patient denied of any fever, chills, shortness of breath, chest pain, abdominal pain, nausea, vomiting, dysuria or changes in bowel habit. We were asked to admit for work up and evaluation of the above problems. Past Medical History:   Diagnosis Date    Hypercholesteremia     Ill-defined condition     endometriosis    Nausea & vomiting     PSVT (paroxysmal supraventricular tachycardia) (HCC)     Psychiatric disorder     anxiety and depression        Past Surgical History:   Procedure Laterality Date    HX GYN       x1    HX GYN      right salpingectomy    HX HEENT      tubes left ear    HX ORTHOPAEDIC      right mensicus and ACL       Social History     Tobacco Use    Smoking status: Never    Smokeless tobacco: Never   Substance Use Topics    Alcohol use: Yes     Comment: occasionally        Family History   Problem Relation Age of Onset    Neuropathy Mother     Diabetes Mother     Cancer Father         prostate    Heart Disease Father     Stroke Father     High Cholesterol Father     Cancer Maternal Grandmother         pancreatic    Heart Disease Paternal Grandmother     Stroke Paternal Grandmother     Anxiety Child     Attention Deficit Hyperactivity Disorder Child     Other Child         autism and dyslexia   Works in elementary school. Allergies   Allergen Reactions    Codeine Itching    Codeine Itching    Lipitor [Atorvastatin] Hives        Prior to Admission medications    Medication Sig Start Date End Date Taking? Authorizing Provider   dilTIAZem ER (CARDIZEM CD) 300 mg capsule Take 1 Capsule by mouth daily.  21   Christal Stuart, NP   cyclobenzaprine (FLEXERIL) 10 mg tablet TAKE 1 TABLET BY MOUTH EVERY NIGHT AS NEEDED 8/11/20   Provider, Historical   escitalopram oxalate (LEXAPRO) 5 mg tablet Take 1 by mouth daily. MUST MAKE APPOINTMENT FOR FURTHER REFILLS OR GET FURTHER FROM PCP  Patient taking differently: Take 20 mg by mouth daily. 9/28/18   Caroline Tinajero MD   ibuprofen (MOTRIN) 800 mg tablet Take 1 Tab by mouth every eight (8) hours as needed for Pain. 3/9/18   George Ardon MD   diphenhydrAMINE (BENADRYL) 25 mg capsule Take 25 mg by mouth as needed. Alternating with Claritan. Provider, Historical   loratadine (CLARITIN) 10 mg tablet Take 10 mg by mouth nightly. Provider, Historical       REVIEW OF SYSTEMS:     I am not able to complete the review of systems because: The patient is intubated and sedated    The patient has altered mental status due to his acute medical problems    The patient has baseline aphasia from prior stroke(s)    The patient has baseline dementia and is not reliable historian    The patient is in acute medical distress and unable to provide information           Total of 12 systems reviewed as follows:       POSITIVE= underlined text  Negative = text not underlined  General:  fever, chills, sweats, generalized weakness, weight loss/gain,      loss of appetite   Eyes:    blurred vision, eye pain, loss of vision, double vision  ENT:    rhinorrhea, pharyngitis   Respiratory:   cough, sputum production, SOB, ANDRES, wheezing, pleuritic pain   Cardiology:   chest pain, palpitations, orthopnea, PND, edema, syncope   Gastrointestinal:  abdominal pain , N/V, diarrhea, dysphagia, constipation, bleeding   Genitourinary:  frequency, urgency, dysuria, hematuria, incontinence   Muskuloskeletal :  arthralgia, myalgia, back pain, right hand swelling, bruise on l right little finger.   Hematology:  easy bruising, nose or gum bleeding, lymphadenopathy   Dermatological: rash, ulceration, pruritis, color change / jaundice  Endocrine:   hot flashes or polydipsia   Neurological:  headache, dizziness, confusion, focal weakness, paresthesia,     Speech difficulties, memory loss, gait difficulty  Psychological: Feelings of anxiety, depression, agitation    Objective:   VITALS:    Visit Vitals  BP (!) 148/91   Pulse 63   Temp 98.4 °F (36.9 °C)   Resp 20   Ht 5' 6\" (1.676 m)   Wt 68.6 kg (151 lb 3.8 oz)   SpO2 90%   BMI 24.41 kg/m²       PHYSICAL EXAM:    General:    Alert, cooperative, no distress, appears stated age. HEENT: Atraumatic, anicteric sclerae, pink conjunctivae     No oral ulcers, mucosa moist, throat clear, dentition fair  Neck:  Supple, symmetrical,  thyroid: non tender  Lungs:   Clear to auscultation bilaterally. No Wheezing or Rhonchi. No rales. Chest wall:  No tenderness  No Accessory muscle use. Heart:   Regular  rhythm,  No  murmur   No edema  Abdomen:   Soft, non-tender. Not distended. Bowel sounds normal  Extremities: No cyanosis. No clubbing, right hand swelling, bruise on lateral right finger. Skin turgor normal, Capillary refill normal, Radial dial pulse 2+  Skin:     Not pale. Not Jaundiced  No rashes   Psych:  Good insight. Not depressed. Not anxious or agitated. Neurologic: EOMs intact. No facial asymmetry. No aphasia or slurred speech. Symmetrical strength, Sensation grossly intact.  Alert and oriented X 4.     _______________________________________________________________________  Care Plan discussed with:    Comments   Patient x    Family  x    RN x    Care Manager                    Consultant:      _______________________________________________________________________  Expected  Disposition:   Home with Family    HH/PT/OT/RN    SNF/LTC    [facility]    ________________________________________________________________________  TOTAL TIME:  28 Minutes    Critical Care Provided     Minutes non procedure based      Comments     Reviewed previous records   >50% of visit spent in counseling and coordination of care  Discussion with patient and/or family and questions answered       ________________________________________________________________________  Signed: Raymondo Galeazzi, MD    Procedures: see electronic medical records for all procedures/Xrays and details which were not copied into this note but were reviewed prior to creation of Plan. LAB DATA REVIEWED:    Recent Results (from the past 24 hour(s))   CBC WITH AUTOMATED DIFF    Collection Time: 08/08/22 10:18 AM   Result Value Ref Range    WBC 8.1 3.6 - 11.0 K/uL    RBC 4.69 3.80 - 5.20 M/uL    HGB 15.4 11.5 - 16.0 g/dL    HCT 44.7 35.0 - 47.0 %    MCV 95.3 80.0 - 99.0 FL    MCH 32.8 26.0 - 34.0 PG    MCHC 34.5 30.0 - 36.5 g/dL    RDW 12.7 11.5 - 14.5 %    PLATELET 575 452 - 138 K/uL    MPV 10.5 8.9 - 12.9 FL    NRBC 0.0 0  WBC    ABSOLUTE NRBC 0.00 0.00 - 0.01 K/uL    NEUTROPHILS 69 32 - 75 %    LYMPHOCYTES 21 12 - 49 %    MONOCYTES 5 5 - 13 %    EOSINOPHILS 3 0 - 7 %    BASOPHILS 1 0 - 1 %    IMMATURE GRANULOCYTES 1 (H) 0.0 - 0.5 %    ABS. NEUTROPHILS 5.7 1.8 - 8.0 K/UL    ABS. LYMPHOCYTES 1.7 0.8 - 3.5 K/UL    ABS. MONOCYTES 0.4 0.0 - 1.0 K/UL    ABS. EOSINOPHILS 0.2 0.0 - 0.4 K/UL    ABS. BASOPHILS 0.0 0.0 - 0.1 K/UL    ABS. IMM.  GRANS. 0.0 0.00 - 0.04 K/UL    DF AUTOMATED     PROTHROMBIN TIME + INR    Collection Time: 08/08/22 10:18 AM   Result Value Ref Range    INR 1.0 0.9 - 1.1      Prothrombin time 10.1 9.0 - 11.1 sec   PTT    Collection Time: 08/08/22 10:18 AM   Result Value Ref Range    aPTT 28.1 22.1 - 31.0 sec    aPTT, therapeutic range     58.0 - 77.0 SECS   CK    Collection Time: 08/08/22 10:53 AM   Result Value Ref Range    CK 70 26 - 192 U/L   FIBRINOGEN    Collection Time: 08/08/22 10:53 AM   Result Value Ref Range    Fibrinogen 389 200 - 007 mg/dL   METABOLIC PANEL, COMPREHENSIVE    Collection Time: 08/08/22 10:53 AM   Result Value Ref Range    Sodium 139 136 - 145 mmol/L    Potassium 3.8 3.5 - 5.1 mmol/L    Chloride 106 97 - 108 mmol/L    CO2 26 21 - 32 mmol/L    Anion gap 7 5 - 15 mmol/L Glucose 98 65 - 100 mg/dL    BUN 10 6 - 20 MG/DL    Creatinine 1.00 0.55 - 1.02 MG/DL    BUN/Creatinine ratio 10 (L) 12 - 20      GFR est AA >60 >60 ml/min/1.73m2    GFR est non-AA 59 (L) >60 ml/min/1.73m2    Calcium 8.8 8.5 - 10.1 MG/DL    Bilirubin, total 0.4 0.2 - 1.0 MG/DL    ALT (SGPT) 22 12 - 78 U/L    AST (SGOT) 22 15 - 37 U/L    Alk.  phosphatase 106 45 - 117 U/L    Protein, total 7.2 6.4 - 8.2 g/dL    Albumin 3.5 3.5 - 5.0 g/dL    Globulin 3.7 2.0 - 4.0 g/dL    A-G Ratio 0.9 (L) 1.1 - 2.2

## 2022-08-08 NOTE — ED NOTES
Assumed care of pt from triage. Pt placed on monitor x 3, reporting pain to R pinky w/ puncture site w/ minimal bruising around the site. Swelling noted to all fingers and hand, marked w/ pen. Pt reporting tight/pressure pain is radiating from R hand to R shoulder. Spoke w/ Poison Control, recommendations include good wound care and elevation w/ pain control and admission for 24hrs after antivenom. 1130 Pt reporting some diffuse itching and \"feeling like it is difficult to breathe\"    1140 Crofab initiated     1153 Pt medicated per orders    1300 Pt itching had initially improved but is returning, denies any SOB or throat swelling, MD notified     1325 Poison control updated     1400 Wound to R pinky cleansed with soap and water and remains elevated. 1500 Initial Crofab completed. Minimal additional swelling to forearm noted and MD notified     [id number] 45 71 37 w/ poison control and provided update - no further recommendations at this time     1740 Attempted to call report to inpatient unit     Patient is being transferred to [address], Room # 2616. Report given to RN herlinda Camacho for routine progression of care. Report consisted of the following information SBAR, Kardex, ED Summary, MAR, and Recent Results. Patient to be transferred to receiving unit by: transport (RN or tech name). Outstanding consults needed: No     Next labs due: No     The following personal items will be sent with the patient during transfer to the floor:     All valuables:    Cardiac monitoring ordered: No     The following CURRENT information was reported to the receiving RN:    Code status: Full Code at time of transfer    Last set of vital signs:  Vital Signs  Level of Consciousness: Alert (0) (08/08/22 1011)  Temp: 98.4 °F (36.9 °C) (08/08/22 1011)  Temp Source: Oral (08/08/22 1011)  Pulse (Heart Rate): 68 (08/08/22 1600)  Resp Rate: 15 (08/08/22 1600)  BP: 135/85 (08/08/22 1600)  MAP (Monitor): 100 (08/08/22 1600)  MAP (Calculated): 102 (08/08/22 1600)  BP 1 Location: Left upper arm (08/08/22 1011)  BP 1 Method: Automatic (08/08/22 1011)  BP Patient Position: Sitting (08/08/22 1011)  MEWS Score: 1 (08/08/22 1011)         Oxygen Therapy  O2 Sat (%): 95 % (08/08/22 1600)  Pulse via Oximetry: 68 beats per minute (08/08/22 1600)  O2 Device: None (Room air) (08/08/22 1047)      Last pain assessment:  Pain 1  Pain Scale 1: Numeric (0 - 10)  Pain Intensity 1: 5  Patient Stated Pain Goal: 0  Pain Reassessment 1: Yes  Pain Description 1: Pressure, Throbbing  Pain Intervention(s) 1: Medication (see MAR)      Wounds: puncture to R pinky     Urinary catheter: due to void  Is there a blackwell order: No     LDAs:       Peripheral IV 08/08/22 Anterior;Left;Proximal Forearm (Active)       Peripheral IV 08/08/22 Anterior;Distal;Left Forearm (Active)         Opportunity for questions and clarification was provided. Katy Hatchet, RN    5873 R pinky cleansed with soap and water. R hand remains elevated and no further edema noted beyond previous markings.  Attempted to call report to inpatient unit

## 2022-08-09 PROCEDURE — 96367 TX/PROPH/DG ADDL SEQ IV INF: CPT

## 2022-08-09 PROCEDURE — 74011000258 HC RX REV CODE- 258: Performed by: INTERNAL MEDICINE

## 2022-08-09 PROCEDURE — 74011250637 HC RX REV CODE- 250/637: Performed by: INTERNAL MEDICINE

## 2022-08-09 PROCEDURE — 74011250636 HC RX REV CODE- 250/636: Performed by: INTERNAL MEDICINE

## 2022-08-09 PROCEDURE — 96366 THER/PROPH/DIAG IV INF ADDON: CPT

## 2022-08-09 PROCEDURE — 96372 THER/PROPH/DIAG INJ SC/IM: CPT

## 2022-08-09 PROCEDURE — G0378 HOSPITAL OBSERVATION PER HR: HCPCS

## 2022-08-09 PROCEDURE — 74011000250 HC RX REV CODE- 250: Performed by: EMERGENCY MEDICINE

## 2022-08-09 RX ORDER — OXYCODONE HYDROCHLORIDE 5 MG/1
5-10 TABLET ORAL
Status: DISCONTINUED | OUTPATIENT
Start: 2022-08-09 | End: 2022-08-10 | Stop reason: HOSPADM

## 2022-08-09 RX ORDER — ACETAMINOPHEN 325 MG/1
650 TABLET ORAL EVERY 6 HOURS
Status: DISCONTINUED | OUTPATIENT
Start: 2022-08-09 | End: 2022-08-10 | Stop reason: HOSPADM

## 2022-08-09 RX ORDER — HYDROMORPHONE HYDROCHLORIDE 1 MG/ML
0.5 INJECTION, SOLUTION INTRAMUSCULAR; INTRAVENOUS; SUBCUTANEOUS
Status: DISCONTINUED | OUTPATIENT
Start: 2022-08-09 | End: 2022-08-10 | Stop reason: HOSPADM

## 2022-08-09 RX ADMIN — DIPHENHYDRAMINE HYDROCHLORIDE 25 MG: 25 CAPSULE ORAL at 10:02

## 2022-08-09 RX ADMIN — AMPICILLIN SODIUM AND SULBACTAM SODIUM 3 G: 2; 1 INJECTION, POWDER, FOR SOLUTION INTRAMUSCULAR; INTRAVENOUS at 14:03

## 2022-08-09 RX ADMIN — HYDROXYZINE HYDROCHLORIDE 25 MG: 25 TABLET, FILM COATED ORAL at 14:03

## 2022-08-09 RX ADMIN — AMPICILLIN SODIUM AND SULBACTAM SODIUM 3 G: 2; 1 INJECTION, POWDER, FOR SOLUTION INTRAMUSCULAR; INTRAVENOUS at 06:24

## 2022-08-09 RX ADMIN — SODIUM CHLORIDE, PRESERVATIVE FREE 10 ML: 5 INJECTION INTRAVENOUS at 06:54

## 2022-08-09 RX ADMIN — DIPHENHYDRAMINE HYDROCHLORIDE 25 MG: 25 CAPSULE ORAL at 20:12

## 2022-08-09 RX ADMIN — ESCITALOPRAM OXALATE 10 MG: 10 TABLET ORAL at 11:54

## 2022-08-09 RX ADMIN — SODIUM CHLORIDE, PRESERVATIVE FREE 10 ML: 5 INJECTION INTRAVENOUS at 14:00

## 2022-08-09 RX ADMIN — PRAVASTATIN SODIUM 20 MG: 10 TABLET ORAL at 22:14

## 2022-08-09 RX ADMIN — ENOXAPARIN SODIUM 40 MG: 100 INJECTION SUBCUTANEOUS at 11:55

## 2022-08-09 RX ADMIN — DILTIAZEM HYDROCHLORIDE 300 MG: 300 CAPSULE, COATED, EXTENDED RELEASE ORAL at 11:54

## 2022-08-09 RX ADMIN — AMPICILLIN SODIUM AND SULBACTAM SODIUM 3 G: 2; 1 INJECTION, POWDER, FOR SOLUTION INTRAMUSCULAR; INTRAVENOUS at 22:16

## 2022-08-09 RX ADMIN — DIPHENHYDRAMINE HYDROCHLORIDE 25 MG: 25 CAPSULE ORAL at 06:24

## 2022-08-09 RX ADMIN — SODIUM CHLORIDE, PRESERVATIVE FREE 10 ML: 5 INJECTION INTRAVENOUS at 22:18

## 2022-08-09 RX ADMIN — ACETAMINOPHEN 650 MG: 325 TABLET ORAL at 20:12

## 2022-08-09 RX ADMIN — HYDROXYZINE HYDROCHLORIDE 25 MG: 25 TABLET, FILM COATED ORAL at 01:02

## 2022-08-09 NOTE — PROGRESS NOTES
End of Shift Note    Bedside shift change report given to Turner, [address] [place] [address] (oncoming nurse) by Krystina Lackey RN (offgoing nurse). Report included the following information SBAR, Kardex, Intake/Output, and MAR    Shift worked:  7am-7pm     Shift summary and any significant changes:    Poison control called for update, see previous note. Red area spread up inner right arm, CCL assessed, marker drawn around it, MD notified. PCA d/c at shift change. Only c/o itching x 2. Concerns for physician to address: Discharge?      Zone phone for oncoming shift:   [phone]

## 2022-08-09 NOTE — PROGRESS NOTES
Problem: Pain  Goal: *Control of Pain  Outcome: Progressing Towards Goal   Assess pain characteristics (eg: Intensity scale; onset; location; quality;severity; duration; frequency;   radiation)    Assess pain management - barriers (eg: Past pain   experiences)    Identify pain expectations (eg: Patient's pain goal; somatic experiences;behavioral changes;   affect)    Identify pain medication concerns (eg: Cultural considerations; addiction   concerns)    Support system identification (eg: Caregiver; community resource; family;friends; [employer]; support   group)    Monitor for change in patient condition (eg: Vital signs changes; changes inlevel of consciousness; nausea; behavioral   changes)    Medication side-effect   assessment      Pain-relief response reassessment (eg: Frequency based on route ofadministration;   effectiveness

## 2022-08-09 NOTE — PROGRESS NOTES
Poison control called in and updated on patient. Poison control advising that she probably doesn't need PCA pump anymore. Swelling will be present for a few more weeks and will turn colors d/t tissue injury. Keep elevated as much as possible.

## 2022-08-09 NOTE — ED PROVIDER NOTES
EMERGENCY DEPARTMENT HISTORY AND PHYSICAL EXAM      Date: 8/8/2022  Patient Name: Maxx Rosen    History of Presenting Illness     Chief Complaint   Patient presents with    Snake Bite     Weeding pt bit by snake she thinks on right hand and right arm radiating now; this happened at 0930       History Provided By: Patient    HPI: Maxx Rosen, 51 y.o. female presents to the ED with cc of snake bite. She states she had been weeding and felt a bite on her hand. She began having severe pain immediately and swelling. She states pain 10/10 worse with movement and touch. She denies any fever or chills. She denies any CP or SOA. She denies abd pain, n/v/d. She denies any other injury. Her tetanus is up-to-date. There are no other complaints, changes, or physical findings at this time. PCP: Julio Newman MD    No current facility-administered medications on file prior to encounter. Current Outpatient Medications on File Prior to Encounter   Medication Sig Dispense Refill    dilTIAZem ER (CARDIZEM CD) 300 mg capsule Take 1 Capsule by mouth daily. 90 Capsule 3    cyclobenzaprine (FLEXERIL) 10 mg tablet TAKE 1 TABLET BY MOUTH EVERY NIGHT AS NEEDED      escitalopram oxalate (LEXAPRO) 5 mg tablet Take 1 by mouth daily. MUST MAKE APPOINTMENT FOR FURTHER REFILLS OR GET FURTHER FROM PCP (Patient taking differently: Take 20 mg by mouth daily.) 30 Tab 0    ibuprofen (MOTRIN) 800 mg tablet Take 1 Tab by mouth every eight (8) hours as needed for Pain. 30 Tab 1    diphenhydrAMINE (BENADRYL) 25 mg capsule Take 25 mg by mouth as needed. Alternating with Claritan.      loratadine (CLARITIN) 10 mg tablet Take 10 mg by mouth nightly.          Past History     Past Medical History:  Past Medical History:   Diagnosis Date    Hypercholesteremia     Ill-defined condition     endometriosis    Nausea & vomiting     PSVT (paroxysmal supraventricular tachycardia) (HCC)     Psychiatric disorder     anxiety and depression Past Surgical History:  Past Surgical History:   Procedure Laterality Date    HX GYN       x1    HX GYN  1999    right salpingectomy    HX HEENT      tubes left ear    HX ORTHOPAEDIC      right mensicus and ACL       Family History:  Family History   Problem Relation Age of Onset    Neuropathy Mother     Diabetes Mother     Cancer Father         prostate    Heart Disease Father     Stroke Father     High Cholesterol Father     Cancer Maternal Grandmother         pancreatic    Heart Disease Paternal Grandmother     Stroke Paternal Grandmother     Anxiety Child     Attention Deficit Hyperactivity Disorder Child     Other Child         autism and dyslexia       Social History:  Social History     Tobacco Use    Smoking status: Never    Smokeless tobacco: Never   Vaping Use    Vaping Use: Never used   Substance Use Topics    Alcohol use: Yes     Comment: occasionally    Drug use: No       Allergies: Allergies   Allergen Reactions    Codeine Itching    Codeine Itching    Lipitor [Atorvastatin] Hives         Review of Systems   Review of Systems   Constitutional: Negative. Negative for appetite change, chills, fatigue and fever. HENT: Negative. Negative for congestion, rhinorrhea, sinus pressure and sore throat. Eyes: Negative. Respiratory: Negative. Negative for cough, choking, chest tightness, shortness of breath and wheezing. Cardiovascular: Negative. Negative for chest pain, palpitations and leg swelling. Gastrointestinal:  Negative for abdominal pain, constipation, diarrhea, nausea and vomiting. Endocrine: Negative. Genitourinary: Negative. Negative for difficulty urinating, dysuria, flank pain and urgency. Musculoskeletal:         Right hand pain   Skin:  Positive for wound. Neurological: Negative. Negative for dizziness, speech difficulty, weakness, light-headedness, numbness and headaches. Psychiatric/Behavioral: Negative.      All other systems reviewed and are negative. Physical Exam   Physical Exam  Vitals and nursing note reviewed. Constitutional:       General: She is in acute distress. Appearance: She is well-developed. She is not diaphoretic. HENT:      Head: Normocephalic and atraumatic. Mouth/Throat:      Mouth: Mucous membranes are moist.      Pharynx: No oropharyngeal exudate. Eyes:      General: No scleral icterus. Extraocular Movements: Extraocular movements intact. Conjunctiva/sclera: Conjunctivae normal.      Pupils: Pupils are equal, round, and reactive to light. Neck:      Vascular: No JVD. Trachea: No tracheal deviation. Cardiovascular:      Rate and Rhythm: Normal rate and regular rhythm. Heart sounds: Normal heart sounds. No murmur heard. Pulmonary:      Effort: Pulmonary effort is normal. No respiratory distress. Breath sounds: Normal breath sounds. No stridor. No wheezing or rales. Musculoskeletal:      Cervical back: Normal range of motion and neck supple. Comments: Right hand- 2 puncture wounds, one at the base of her 5th digit and one on palm-ulnar side, there is swelling of the hand and wrist, she has full ROM of fingers, hand but painful, there is no swelling of the forearm, full ROM of the elbow, distal PMS intact x 4   Skin:     General: Skin is warm and dry. Capillary Refill: Capillary refill takes less than 2 seconds. Neurological:      Mental Status: She is alert and oriented to person, place, and time. Cranial Nerves: No cranial nerve deficit.       Comments: No gross motor or sensory deficits    Psychiatric:         Mood and Affect: Mood normal.         Behavior: Behavior normal.       Diagnostic Study Results     Labs -     Recent Results (from the past 12 hour(s))   CK    Collection Time: 08/08/22 10:53 AM   Result Value Ref Range    CK 70 26 - 192 U/L   FIBRINOGEN    Collection Time: 08/08/22 10:53 AM   Result Value Ref Range    Fibrinogen 389 200 - 368 mg/dL   METABOLIC PANEL, COMPREHENSIVE    Collection Time: 08/08/22 10:53 AM   Result Value Ref Range    Sodium 139 136 - 145 mmol/L    Potassium 3.8 3.5 - 5.1 mmol/L    Chloride 106 97 - 108 mmol/L    CO2 26 21 - 32 mmol/L    Anion gap 7 5 - 15 mmol/L    Glucose 98 65 - 100 mg/dL    BUN 10 6 - 20 MG/DL    Creatinine 1.00 0.55 - 1.02 MG/DL    BUN/Creatinine ratio 10 (L) 12 - 20      GFR est AA >60 >60 ml/min/1.73m2    GFR est non-AA 59 (L) >60 ml/min/1.73m2    Calcium 8.8 8.5 - 10.1 MG/DL    Bilirubin, total 0.4 0.2 - 1.0 MG/DL    ALT (SGPT) 22 12 - 78 U/L    AST (SGOT) 22 15 - 37 U/L    Alk. phosphatase 106 45 - 117 U/L    Protein, total 7.2 6.4 - 8.2 g/dL    Albumin 3.5 3.5 - 5.0 g/dL    Globulin 3.7 2.0 - 4.0 g/dL    A-G Ratio 0.9 (L) 1.1 - 2.2         Radiologic Studies -   No orders to display     CT Results  (Last 48 hours)      None          CXR Results  (Last 48 hours)      None            Medical Decision Making   I am the first provider for this patient. I reviewed the vital signs, available nursing notes, past medical history, past surgical history, family history and social history. Vital Signs-Reviewed the patient's vital signs. Patient Vitals for the past 12 hrs:   Temp Pulse Resp BP SpO2   08/08/22 2004 97.6 °F (36.4 °C) (!) 53 17 128/84 96 %   08/08/22 1811 -- 66 16 -- 92 %   08/08/22 1801 98.1 °F (36.7 °C) (!) 58 12 121/79 97 %   08/08/22 1600 -- 68 15 135/85 95 %   08/08/22 1301 -- 63 20 (!) 148/91 90 %   08/08/22 1230 -- (!) 59 16 133/75 --   08/08/22 1200 -- 82 21 (!) 149/93 --       Records Reviewed: Nursing Notes, Old Medical Records, Previous Radiology Studies, and Previous Laboratory Studies  Hx of PSVT followed by [facility]     Provider Notes (Medical Decision Making):   DDx- Snake bite    ED Course:   Initial assessment performed. The patients presenting problems have been discussed, and they are in agreement with the care plan formulated and outlined with them.   I have encouraged them to ask questions as they arise throughout their visit. Pt had come to the ED directly after event. Pain severe in nature. IV pain meds and 4 vials of Crofab ordered. Pain swelling and 2 fang marks c/w likely copperhead bite or other venomous snake. Hand and finger involved which would be concerning for limb threatening injury. Following the dilaudid pt complaining of itching. There are no hives noted, no SOA or wheezing, likely adverse effect of opiate. Benadryl ordered    Crofab started, Benadryl had helped some but continued. Will order Atarax PO. Do not feel this is an allergic response    Consult:  Discussed with toxicology, agree with 4 vials of Crofab. Admit for 24hr observation. Pain tolerable. Pt may need further Crofab which would be based on symptoms- worsening swelling, worsening pain. Will start pt on Fentanyl PCA. Consult:  Case discussed with Hospitalist, will evaluate and admit. Critical Care Time:   CRITICAL CARE NOTE :    10:28 PM  IMPENDING DETERIORATION -threatened limb, hemolysis   ASSOCIATED RISK FACTORS - Severe pain due to snake bite  MANAGEMENT- Bedside Assessment and Supervision of Care  INTERPRETATION -  Blood Pressure and labs  INTERVENTIONS - Pain control, CroFab  CASE REVIEW - Hospitalist/Intensivist, Medical Sub-Specialist, Nursing, and Family  TREATMENT RESPONSE -Stable  PERFORMED BY - Self    NOTES   :  I have spent 40 minutes of critical care time involved in lab review, consultations with specialist, family decision- making, bedside attention and documentation. This time excludes time spent in any separate billed procedures. During this entire length of time I was immediately available to the patient . José Rincon DO      Disposition:  Admit    Diagnosis     Clinical Impression:   1.  Snake bite, initial encounter        Attestations:    José Rincon DO        Please note that this dictation was completed with [employer], the computer voice recognition software. Quite often unanticipated grammatical, syntax, homophones, and other interpretive errors are inadvertently transcribed by the computer software. Please disregard these errors. Please excuse any errors that have escaped final proofreading. Thank you.

## 2022-08-10 VITALS
DIASTOLIC BLOOD PRESSURE: 76 MMHG | HEART RATE: 70 BPM | BODY MASS INDEX: 24.31 KG/M2 | RESPIRATION RATE: 16 BRPM | SYSTOLIC BLOOD PRESSURE: 131 MMHG | HEIGHT: 66 IN | OXYGEN SATURATION: 96 % | TEMPERATURE: 98.1 F | WEIGHT: 151.24 LBS

## 2022-08-10 LAB
ALBUMIN SERPL-MCNC: 3.5 G/DL (ref 3.5–5)
ALBUMIN/GLOB SERPL: 0.8 {RATIO} (ref 1.1–2.2)
ALP SERPL-CCNC: 128 U/L (ref 45–117)
ALT SERPL-CCNC: 27 U/L (ref 12–78)
ANION GAP SERPL CALC-SCNC: 5 MMOL/L (ref 5–15)
AST SERPL-CCNC: 34 U/L (ref 15–37)
BASOPHILS # BLD: 0 K/UL (ref 0–0.1)
BASOPHILS NFR BLD: 0 % (ref 0–1)
BILIRUB SERPL-MCNC: 0.4 MG/DL (ref 0.2–1)
BUN SERPL-MCNC: 10 MG/DL (ref 6–20)
BUN/CREAT SERPL: 8 (ref 12–20)
CALCIUM SERPL-MCNC: 9.1 MG/DL (ref 8.5–10.1)
CHLORIDE SERPL-SCNC: 104 MMOL/L (ref 97–108)
CK SERPL-CCNC: 72 U/L (ref 26–192)
CO2 SERPL-SCNC: 29 MMOL/L (ref 21–32)
CREAT SERPL-MCNC: 1.24 MG/DL (ref 0.55–1.02)
DIFFERENTIAL METHOD BLD: NORMAL
EOSINOPHIL # BLD: 0.2 K/UL (ref 0–0.4)
EOSINOPHIL NFR BLD: 3 % (ref 0–7)
ERYTHROCYTE [DISTWIDTH] IN BLOOD BY AUTOMATED COUNT: 12.6 % (ref 11.5–14.5)
GLOBULIN SER CALC-MCNC: 4.3 G/DL (ref 2–4)
GLUCOSE SERPL-MCNC: 89 MG/DL (ref 65–100)
HCT VFR BLD AUTO: 43.4 % (ref 35–47)
HGB BLD-MCNC: 14.8 G/DL (ref 11.5–16)
IMM GRANULOCYTES # BLD AUTO: 0 K/UL (ref 0–0.04)
IMM GRANULOCYTES NFR BLD AUTO: 0 % (ref 0–0.5)
LYMPHOCYTES # BLD: 2 K/UL (ref 0.8–3.5)
LYMPHOCYTES NFR BLD: 27 % (ref 12–49)
MCH RBC QN AUTO: 32.8 PG (ref 26–34)
MCHC RBC AUTO-ENTMCNC: 34.1 G/DL (ref 30–36.5)
MCV RBC AUTO: 96.2 FL (ref 80–99)
MONOCYTES # BLD: 0.5 K/UL (ref 0–1)
MONOCYTES NFR BLD: 7 % (ref 5–13)
NEUTS SEG # BLD: 4.8 K/UL (ref 1.8–8)
NEUTS SEG NFR BLD: 63 % (ref 32–75)
NRBC # BLD: 0 K/UL (ref 0–0.01)
NRBC BLD-RTO: 0 PER 100 WBC
PLATELET # BLD AUTO: 233 K/UL (ref 150–400)
PMV BLD AUTO: 10.3 FL (ref 8.9–12.9)
POTASSIUM SERPL-SCNC: 3.6 MMOL/L (ref 3.5–5.1)
PROT SERPL-MCNC: 7.8 G/DL (ref 6.4–8.2)
RBC # BLD AUTO: 4.51 M/UL (ref 3.8–5.2)
SODIUM SERPL-SCNC: 138 MMOL/L (ref 136–145)
WBC # BLD AUTO: 7.6 K/UL (ref 3.6–11)

## 2022-08-10 PROCEDURE — 74011250636 HC RX REV CODE- 250/636: Performed by: INTERNAL MEDICINE

## 2022-08-10 PROCEDURE — 74011000258 HC RX REV CODE- 258: Performed by: INTERNAL MEDICINE

## 2022-08-10 PROCEDURE — 74011250637 HC RX REV CODE- 250/637: Performed by: INTERNAL MEDICINE

## 2022-08-10 PROCEDURE — 65270000029 HC RM PRIVATE

## 2022-08-10 PROCEDURE — 85025 COMPLETE CBC W/AUTO DIFF WBC: CPT

## 2022-08-10 PROCEDURE — 80053 COMPREHEN METABOLIC PANEL: CPT

## 2022-08-10 PROCEDURE — 82550 ASSAY OF CK (CPK): CPT

## 2022-08-10 PROCEDURE — G0378 HOSPITAL OBSERVATION PER HR: HCPCS

## 2022-08-10 PROCEDURE — 74011250636 HC RX REV CODE- 250/636: Performed by: EMERGENCY MEDICINE

## 2022-08-10 PROCEDURE — 36415 COLL VENOUS BLD VENIPUNCTURE: CPT

## 2022-08-10 PROCEDURE — 96367 TX/PROPH/DG ADDL SEQ IV INF: CPT

## 2022-08-10 PROCEDURE — 74011000250 HC RX REV CODE- 250: Performed by: EMERGENCY MEDICINE

## 2022-08-10 RX ORDER — AMOXICILLIN AND CLAVULANATE POTASSIUM 875; 125 MG/1; MG/1
1 TABLET, FILM COATED ORAL EVERY 12 HOURS
Qty: 14 TABLET | Refills: 0 | Status: SHIPPED | OUTPATIENT
Start: 2022-08-10 | End: 2022-08-17

## 2022-08-10 RX ADMIN — AMPICILLIN SODIUM AND SULBACTAM SODIUM 3 G: 2; 1 INJECTION, POWDER, FOR SOLUTION INTRAMUSCULAR; INTRAVENOUS at 06:31

## 2022-08-10 RX ADMIN — ESCITALOPRAM OXALATE 10 MG: 10 TABLET ORAL at 09:35

## 2022-08-10 RX ADMIN — DILTIAZEM HYDROCHLORIDE 300 MG: 300 CAPSULE, COATED, EXTENDED RELEASE ORAL at 09:35

## 2022-08-10 RX ADMIN — ACETAMINOPHEN 650 MG: 325 TABLET ORAL at 06:31

## 2022-08-10 RX ADMIN — ACETAMINOPHEN 650 MG: 325 TABLET ORAL at 00:27

## 2022-08-10 RX ADMIN — SODIUM CHLORIDE, PRESERVATIVE FREE 10 ML: 5 INJECTION INTRAVENOUS at 06:32

## 2022-08-10 RX ADMIN — ENOXAPARIN SODIUM 40 MG: 100 INJECTION SUBCUTANEOUS at 09:35

## 2022-08-10 NOTE — PROGRESS NOTES
Patient is ready for d/c from CM standpoint    Transition of Care Plan:    RUR:n/a  Disposition:Home w/family  Follow up appointments:  DME needed:n/a  Transportation at [address] [facility] or friend  [address] [place] [address] Avenue or means to access home:         Medicare Letter:n/a  Is patient a BCPI-A Bundle:  n/a         If yes, was Bundle Letter given?:    Is patient a  and connected with the [place]? If yes, was [place] transfer form completed and [facility] notified? Caregiver Contact:  Discharge Caregiver contacted prior to discharge? Care Conference needed?:         Patient is d/c home today without any needs or concerns. Reason for Admission:  Snake bite                  RUR Score:     n/a                Plan for utilizing home health:   n/a       PCP: First and Last name:  Julio Newman MD     Name of Practice:    Are you a current patient: Yes/No: y   Approximate date of last visit: 3 weeks   Can you participate in a virtual visit with your PCP:                     Current Advanced Directive/Advance Care Plan: Full Code                    Transition of Care Plan:      Prior to admission, pt was independent w/ADL/IADL to include driving. No history of HH/Rehab or DME use. Patient reports a large support system to include, , son & in-laws. No needs or concerns identified. PCP-   Julio Newman MD  Pharmacy- [facility] on [address]    Care Management Interventions  PCP Verified by CM: Yes  Mode of Transport at Discharge:  Other (see comment) (family or friend)  Transition of Care Consult (CM Consult): Discharge Planning  Discharge Durable Medical Equipment: No (no DME use)  Physical Therapy Consult: No  Occupational Therapy Consult: No  Speech Therapy Consult: No  Support Systems: Spouse/Significant Other, Child(henrietta), Other Family Member(s) (Lives in a two story home w/bedroom on first [address]  [address])  Confirm Follow Up Transport: Self  Discharge Location  Patient Expects to be Discharged to[de-identified] Home with family assistance      Gerard Laguerre  [id number]

## 2022-08-10 NOTE — PROGRESS NOTES
Hospitalist Progress Note    NAME: Dawn Rodrigues   :  1975   MRN:  [id number]     Admit date: 2022    Today's date: 22    PCP: Denise Kimble MD      Anticipated discharge date:    Barriers:      Assessment / Plan:    Copperhead Snakebite right hand little finger swelling POA  ? Cellulitis unlikely POA  Lymphangitis POA  Fibromyalgia POA  S/p crofab dose in ED  Severe pain right hand  Redness and tenderness extends up right media arm   Cellulitis unlikely after snake bites  Currently on IV fentanyl gtt  Transition to Po and IV pain meds  If pain and lymphangitic erythema stable, D/C in AM  Serial labs    Prior SVT. Continue cardizem     Hyperlipidemia   Continue with pravastatin 20 mg at bedtime. Fibromyalgia. Continue with citalopram/Flexeril as needed. Code Status: Full code. Surrogate Decision Maker:     DVT Prophylaxis: Lovenox. GI Prophylaxis: not indicated     Baseline: Independent for activities of daily living. Body mass index is 24.41 kg/m². Subjective:     Chief Complaint / Reason for Physician Visit  \"The pain in my hand is better\". Discussed with RN events overnight. Erythema   Review of Systems:  Symptom Y/N Comments  Symptom Y/N Comments   Fever/Chills n   Chest Pain n    Poor Appetite    Edema     Cough n   Abdominal Pain n    Sputum    Joint Pain     SOB/ANDRES n   Headache     Nausea/vomit n   Tolerating PT/OT     Diarrhea n   Tolerating Diet     Constipation    Other       Could NOT obtain due to:      Objective:     VITALS:   Last 24hrs VS reviewed since prior progress note.  Most recent are:  Patient Vitals for the past 24 hrs:   Temp Pulse Resp BP SpO2   22 2036 99.2 °F (37.3 °C) 65 17 125/74 97 %   22 1534 99.2 °F (37.3 °C) 77 18 125/82 96 %   22 1200 99.2 °F (37.3 °C) 70 18 129/83 91 %   22 0837 97.4 °F (36.3 °C) 69 18 122/81 95 %   22 0307 97.7 °F (36.5 °C) 75 18 125/83 100 % Intake/Output Summary (Last 24 hours) at 8/9/2022 2304  Last data filed at 8/9/2022 1700  Gross per 24 hour   Intake 400 ml   Output --   Net 400 ml        Wt Readings from Last 12 Encounters:   08/08/22 68.6 kg (151 lb 3.8 oz)   11/01/21 70.5 kg (155 lb 8 oz)   09/10/20 66.4 kg (146 lb 6.4 oz)   03/09/18 64.6 kg (142 lb 6.7 oz)   02/28/18 65.3 kg (143 lb 15.4 oz)   08/14/17 62.5 kg (137 lb 12.8 oz)   07/07/17 61.5 kg (135 lb 9.6 oz)   11/29/16 62.6 kg (138 lb)   10/20/16 62 kg (136 lb 11 oz)   04/11/16 63.2 kg (139 lb 6.4 oz)   04/22/15 62.8 kg (138 lb 6.4 oz)   02/03/14 62.8 kg (138 lb 6.4 oz)       PHYSICAL EXAM:    I had a face to face encounter and independently examined this patient on 08/09/22 as outlined below:    General: WD, WN. Alert, cooperative, no acute distress    EENT:  PERRL. Anicteric sclerae. MMM  Neck:  No meningismus, no thyromegaly  Resp:  CTA bilaterally, no wheezing or rales. No accessory muscle use  CV:  Regular  rhythm,  No edema  GI:  Soft, Non distended, Non tender. +Bowel sounds, no rebound  Neurologic:  Alert and oriented X 3, normal speech, non focal motor exam  Psych:   Good insight. Not anxious nor agitated  Skin:  Edema and redness right finger, extending erythema along medial right arm              Reviewed most current lab test results and cultures  YES  Reviewed most current radiology test results   YES  Review and summation of old records today    NO  Reviewed patient's current orders and MAR    YES  PMH/SH reviewed - no change compared to H&P  ________________________________________________________________________  Care Plan discussed with:    Comments   Patient x    Family      RN x    Care Manager     Consultant                        Multidiciplinary team rounds were held today with , nursing, pharmacist and clinical coordinator. Patient's plan of care was discussed; medications were reviewed and discharge planning was addressed. ________________________________________________________________________      Comments   >50% of visit spent in counseling and coordination of care     ________________________________________________________________________  Albert Nam MD     Procedures: see electronic medical records for all procedures/Xrays and details which were not copied into this note but were reviewed prior to creation of Plan. LABS:  I reviewed today's most current labs and imaging studies.   Pertinent labs include:  Recent Labs     08/08/22  1018   WBC 8.1   HGB 15.4   HCT 44.7        Recent Labs     08/08/22  1053 08/08/22  1018     --    K 3.8  --      --    CO2 26  --    GLU 98  --    BUN 10  --    CREA 1.00  --    CA 8.8  --    ALB 3.5  --    TBILI 0.4  --    ALT 22  --    INR  --  1.0

## 2022-08-10 NOTE — DISCHARGE SUMMARY
Hospitalist Discharge Summary     Patient ID:  Maxx Rosen  [id number]  1 y.o.  1975    PCP on record: Julio Newman MD    Admit date: 8/8/2022  Discharge date and time: 8/10/2022      DISCHARGE DIAGNOSIS:    Snake bite      CONSULTATIONS:  IP CONSULT TO HOSPITALIST    Excerpted HPI from H&P of Zuhair Sandoval MD:    Agueda Crowe is a 51 y.o.  female who presents with snakebite this morning with right hand swelling. As per patient she had a past medical history of fibromyalgia, hyperlipidemia, paroxysmal SVT who presented to the emergency department with a snakebite on little finger of right hand. As per patient she was working in her backyard and was bitten by a snake with likely copperhead. Patient complaining of right hand swelling with bruise over little finger. Patient denied of any fever, chills, shortness of breath, chest pain, abdominal pain, nausea, vomiting, dysuria or changes in bowel habit. We were asked to admit for work up and evaluation of the above problems. ______________________________________________________________________  DISCHARGE SUMMARY/HOSPITAL COURSE:  for full details see H&P, daily progress notes, labs, consult notes. Patient was given crofab and antibiotics. She was also given abx. She felt markedly improved and was able to be discharged home.        _______________________________________________________________________  Patient seen and examined by me on discharge day. Pertinent Findings:  Gen:    Not in distress  Chest: Clear lungs  CVS:   Regular rhythm.   No edema  Abd:  Soft, not distended, not tender  Neuro:  Alert, Oriented x 4, grossly non focal exam  _______________________________________________________________________  DISCHARGE MEDICATIONS:   Current Discharge Medication List        START taking these medications    Details   amoxicillin-clavulanate (Augmentin) 875-125 mg per tablet Take 1 Tablet by mouth every twelve (12) hours for 7 days. Qty: 14 Tablet, Refills: 0  Start date: 8/10/2022, End date: 8/17/2022           CONTINUE these medications which have NOT CHANGED    Details   dilTIAZem ER (CARDIZEM CD) 300 mg capsule Take 1 Capsule by mouth daily. Qty: 90 Capsule, Refills: 3    Associated Diagnoses: Paroxysmal SVT (supraventricular tachycardia) (HCC)      cyclobenzaprine (FLEXERIL) 10 mg tablet TAKE 1 TABLET BY MOUTH EVERY NIGHT AS NEEDED      escitalopram oxalate (LEXAPRO) 5 mg tablet Take 1 by mouth daily. MUST MAKE APPOINTMENT FOR FURTHER REFILLS OR GET FURTHER FROM PCP  Qty: 30 Tab, Refills: 0    Associated Diagnoses: Post concussion syndrome; Post-concussion headache      ibuprofen (MOTRIN) 800 mg tablet Take 1 Tab by mouth every eight (8) hours as needed for Pain. Qty: 30 Tab, Refills: 1      diphenhydrAMINE (BENADRYL) 25 mg capsule Take 25 mg by mouth as needed. Alternating with Claritan.      loratadine (CLARITIN) 10 mg tablet Take 10 mg by mouth nightly. My Recommended Diet, Activity, Wound Care, and follow-up labs are listed in the patient's Discharge Insturctions which I have personally completed and reviewed.   Risk of deterioration: Low    Condition at Discharge:  Stable  _____________________________________________________________________    Disposition  Home with family, no needs  ____________________________________________________________________    Care Plan discussed with:   Patient, Family, RN, Care Manager, Consultant    ____________________________________________________________________    Code Status: Full Code  ____________________________________________________________________      Condition at Discharge:  Stable  _____________________________________________________________________  Follow up with:   PCP : Georgi Marquez MD  Follow-up Information       Follow up With Specialties Details Why Jena Pruett MD Family Medicine Follow up [address]  [phone]                  Total time in minutes spent coordinating this discharge (includes going over instructions, follow-up, prescriptions, and preparing report for sign off to her PCP) :  35 minutes    Signed:  Sally Montiel MD

## 2022-10-06 ENCOUNTER — TRANSCRIBE ORDER (OUTPATIENT)
Dept: SCHEDULING | Age: 47
End: 2022-10-06

## 2022-10-06 DIAGNOSIS — K63.89 COLONIC MASS: Primary | ICD-10-CM

## 2022-10-07 ENCOUNTER — HOSPITAL ENCOUNTER (OUTPATIENT)
Dept: CT IMAGING | Age: 47
Discharge: HOME | End: 2022-10-07
Attending: INTERNAL MEDICINE
Payer: COMMERCIAL

## 2022-10-07 DIAGNOSIS — K63.89 COLONIC MASS: ICD-10-CM

## 2022-10-07 PROCEDURE — 74011000636 HC RX REV CODE- 636: Performed by: INTERNAL MEDICINE

## 2022-10-07 PROCEDURE — 74177 CT ABD & PELVIS W/CONTRAST: CPT

## 2022-10-07 RX ADMIN — IOPAMIDOL 100 ML: 755 INJECTION, SOLUTION INTRAVENOUS at 11:09

## 2022-10-08 ENCOUNTER — TRANSCRIBE ORDER (OUTPATIENT)
Dept: SCHEDULING | Age: 47
End: 2022-10-08

## 2022-10-08 DIAGNOSIS — R16.0 LIVER MASS: Primary | ICD-10-CM

## 2022-10-11 ENCOUNTER — TELEPHONE (OUTPATIENT)
Dept: CASE MANAGEMENT | Age: 47
End: 2022-10-11

## 2022-10-11 ENCOUNTER — NURSE NAVIGATOR (OUTPATIENT)
Dept: CASE MANAGEMENT | Age: 47
End: 2022-10-11

## 2022-10-11 NOTE — NURSE NAVIGATOR
[facility]   Breast Navigator Encounter    Name:    Radha John  Age:    51 y.o. Diagnosis: Rectosigmoid cancer    Interdisciplinary Team:  Med-Onc:  Mindi Sullivan MD   Surg-Onc:  Luigi Rosenberg MD    Rad-Onc:         Nurse Navigator:  Heather Farooq, MSN, RN, OCB      Encounter type:  [x]Patient Initiated  []Navigator Follow-up []Pre-op  []Post-op  []Check-in Prior to First Treatment []Treatment Modality Change  []Initial Navigator Encounter []Other:     Call patient today to introduce myself and give her my contact information. She was in class and asked if she could call me back. I told her that would be great. She will call me later today.  Thank you Dr. Ilana Herrera for the referral.                                                  Heather Farooq, MSN, RN, [facility] Oncology Nurse [address] [place], [place] [address]port: [phone]  Virtual Fax: [phone]  [email or web address]  Good Help to Those in [facility]

## 2022-10-11 NOTE — TELEPHONE ENCOUNTER
I called the patient today to introduce myself and explain my role. She was at school needed to call me back later. She asked if she could call me at 12:30p. I told her that would be fine. The patient called me back at 12:30 at which time we talked about the role of nurse navigators. She told me that she has lots of support. One of her friends works at [address] [place] [address] as a nurse. She also have other family and friends who are supporting her. I told her that it is great to have so many to support her. I explained that I was here to answer any questions, concerns or issues she may have. I am here to support her also. She told me that when she saw Dr. Malathi Kitchen that a plan was made of the things that she needs to get done. She has already had her CT scans. I asked her if it was okay to e-mail her and she said yes. I sent her an e-mail so that she will have all my contact information. Thank you Dr. Malathi Kitchen for referring this patient to me. She is a very nice person.

## 2022-10-12 ENCOUNTER — TRANSCRIBE ORDER (OUTPATIENT)
Dept: SCHEDULING | Age: 47
End: 2022-10-12

## 2022-10-12 DIAGNOSIS — C20 RECTAL CANCER (HCC): Primary | ICD-10-CM

## 2022-10-13 ENCOUNTER — TRANSCRIBE ORDER (OUTPATIENT)
Dept: SCHEDULING | Age: 47
End: 2022-10-13

## 2022-10-13 DIAGNOSIS — C18.7 MALIGNANT NEOPLASM OF SIGMOID COLON (HCC): Primary | ICD-10-CM

## 2022-10-14 ENCOUNTER — HOSPITAL ENCOUNTER (OUTPATIENT)
Dept: MRI IMAGING | Age: 47
Discharge: HOME | End: 2022-10-14
Attending: INTERNAL MEDICINE
Payer: COMMERCIAL

## 2022-10-14 DIAGNOSIS — C18.7 MALIGNANT NEOPLASM OF SIGMOID COLON (HCC): ICD-10-CM

## 2022-10-14 PROCEDURE — A9577 INJ MULTIHANCE: HCPCS | Performed by: INTERNAL MEDICINE

## 2022-10-14 PROCEDURE — 74183 MRI ABD W/O CNTR FLWD CNTR: CPT

## 2022-10-14 PROCEDURE — 74011250636 HC RX REV CODE- 250/636: Performed by: INTERNAL MEDICINE

## 2022-10-14 RX ADMIN — GADOBENATE DIMEGLUMINE 14 ML: 529 INJECTION, SOLUTION INTRAVENOUS at 16:50

## 2022-10-18 ENCOUNTER — HOSPITAL ENCOUNTER (OUTPATIENT)
Dept: MRI IMAGING | Age: 47
Discharge: HOME | End: 2022-10-18
Attending: SURGERY
Payer: COMMERCIAL

## 2022-10-18 DIAGNOSIS — C20 RECTAL CANCER (HCC): ICD-10-CM

## 2022-10-18 PROCEDURE — A9576 INJ PROHANCE MULTIPACK: HCPCS | Performed by: SURGERY

## 2022-10-18 PROCEDURE — 74011250636 HC RX REV CODE- 250/636: Performed by: SURGERY

## 2022-10-18 PROCEDURE — 72197 MRI PELVIS W/O & W/DYE: CPT

## 2022-10-18 RX ADMIN — GADOTERIDOL 14 ML: 279.3 INJECTION, SOLUTION INTRAVENOUS at 13:00

## 2022-10-19 ENCOUNTER — ANESTHESIA (OUTPATIENT)
Dept: ENDOSCOPY | Age: 47
End: 2022-10-19
Payer: COMMERCIAL

## 2022-10-19 ENCOUNTER — HOSPITAL ENCOUNTER (OUTPATIENT)
Age: 47
Setting detail: OUTPATIENT SURGERY
Discharge: HOME | End: 2022-10-19
Attending: SURGERY | Admitting: SURGERY
Payer: COMMERCIAL

## 2022-10-19 ENCOUNTER — ANESTHESIA EVENT (OUTPATIENT)
Dept: ENDOSCOPY | Age: 47
End: 2022-10-19
Payer: COMMERCIAL

## 2022-10-19 VITALS
TEMPERATURE: 97.9 F | SYSTOLIC BLOOD PRESSURE: 163 MMHG | RESPIRATION RATE: 19 BRPM | HEART RATE: 62 BPM | WEIGHT: 150.6 LBS | BODY MASS INDEX: 24.2 KG/M2 | OXYGEN SATURATION: 99 % | HEIGHT: 66 IN | DIASTOLIC BLOOD PRESSURE: 90 MMHG

## 2022-10-19 PROCEDURE — 76060000031 HC ANESTHESIA FIRST 0.5 HR: Performed by: SURGERY

## 2022-10-19 PROCEDURE — 76040000019: Performed by: SURGERY

## 2022-10-19 PROCEDURE — 74011250636 HC RX REV CODE- 250/636: Performed by: SURGERY

## 2022-10-19 PROCEDURE — 2709999900 HC NON-CHARGEABLE SUPPLY: Performed by: SURGERY

## 2022-10-19 PROCEDURE — 74011250636 HC RX REV CODE- 250/636: Performed by: NURSE ANESTHETIST, CERTIFIED REGISTERED

## 2022-10-19 RX ORDER — SODIUM CHLORIDE 0.9 % (FLUSH) 0.9 %
5-40 SYRINGE (ML) INJECTION AS NEEDED
Status: DISCONTINUED | OUTPATIENT
Start: 2022-10-19 | End: 2022-10-19 | Stop reason: HOSPADM

## 2022-10-19 RX ORDER — DEXTROMETHORPHAN/PSEUDOEPHED 2.5-7.5/.8
1.2 DROPS ORAL
Status: DISCONTINUED | OUTPATIENT
Start: 2022-10-19 | End: 2022-10-19 | Stop reason: HOSPADM

## 2022-10-19 RX ORDER — SODIUM CHLORIDE 0.9 % (FLUSH) 0.9 %
5-40 SYRINGE (ML) INJECTION EVERY 8 HOURS
Status: DISCONTINUED | OUTPATIENT
Start: 2022-10-19 | End: 2022-10-19 | Stop reason: HOSPADM

## 2022-10-19 RX ORDER — LORAZEPAM 1 MG/1
1 TABLET ORAL
COMMUNITY

## 2022-10-19 RX ORDER — FLUMAZENIL 0.1 MG/ML
0.2 INJECTION INTRAVENOUS
Status: DISCONTINUED | OUTPATIENT
Start: 2022-10-19 | End: 2022-10-19 | Stop reason: HOSPADM

## 2022-10-19 RX ORDER — BUPROPION HYDROCHLORIDE 300 MG/1
300 TABLET ORAL DAILY
COMMUNITY

## 2022-10-19 RX ORDER — SODIUM CHLORIDE 9 MG/ML
50 INJECTION, SOLUTION INTRAVENOUS CONTINUOUS
Status: DISCONTINUED | OUTPATIENT
Start: 2022-10-19 | End: 2022-10-19 | Stop reason: HOSPADM

## 2022-10-19 RX ORDER — SODIUM CHLORIDE 9 MG/ML
25 INJECTION, SOLUTION INTRAVENOUS CONTINUOUS
Status: DISCONTINUED | OUTPATIENT
Start: 2022-10-19 | End: 2022-10-19 | Stop reason: HOSPADM

## 2022-10-19 RX ORDER — NALOXONE HYDROCHLORIDE 0.4 MG/ML
0.4 INJECTION, SOLUTION INTRAMUSCULAR; INTRAVENOUS; SUBCUTANEOUS
Status: DISCONTINUED | OUTPATIENT
Start: 2022-10-19 | End: 2022-10-19 | Stop reason: HOSPADM

## 2022-10-19 RX ORDER — PROPOFOL 10 MG/ML
INJECTION, EMULSION INTRAVENOUS AS NEEDED
Status: DISCONTINUED | OUTPATIENT
Start: 2022-10-19 | End: 2022-10-19 | Stop reason: HOSPADM

## 2022-10-19 RX ORDER — EPINEPHRINE 0.1 MG/ML
1 INJECTION INTRACARDIAC; INTRAVENOUS
Status: DISCONTINUED | OUTPATIENT
Start: 2022-10-19 | End: 2022-10-19 | Stop reason: HOSPADM

## 2022-10-19 RX ORDER — ATROPINE SULFATE 0.1 MG/ML
0.5 INJECTION INTRAVENOUS
Status: DISCONTINUED | OUTPATIENT
Start: 2022-10-19 | End: 2022-10-19 | Stop reason: HOSPADM

## 2022-10-19 RX ORDER — ATORVASTATIN CALCIUM 20 MG/1
20 TABLET, FILM COATED ORAL DAILY
COMMUNITY
End: 2022-11-02

## 2022-10-19 RX ADMIN — PROPOFOL 150 MG: 10 INJECTION, EMULSION INTRAVENOUS at 08:43

## 2022-10-19 RX ADMIN — SODIUM CHLORIDE 25 ML/HR: 9 INJECTION, SOLUTION INTRAVENOUS at 08:31

## 2022-10-19 NOTE — ROUTINE PROCESS
Carlos Weaver  1975  [id number]    Situation:  Verbal report received from: Alejandra Caldera  Procedure: Procedure(s): FLEXIBLE SIGMOIDOSCOPY    Background:    Preoperative diagnosis: RECTAL CANCER  Postoperative diagnosis: Rectal cancer    :  Dr. Betts Console  Assistant(s): Endoscopy RN-1: Mavis Vasquez    Specimens: * No specimens in log *  H. Pylori  no    Assessment:  Intra-procedure medications     Anesthesia gave intra-procedure sedation and medications, see anesthesia flow sheet yes    Intravenous fluids: NS@ KVO     Vital signs stable     Abdominal assessment: round and soft     Recommendation:  Discharge patient per MD order.   Family or Friend   Permission to share finding with family or friend yes

## 2022-10-19 NOTE — DISCHARGE INSTRUCTIONS
Rabia Lamar  [id number]  1975    COLON DISCHARGE INSTRUCTIONS  Discomfort:  Redness at IV site- apply warm compress to area; if redness or soreness persist- contact your physician  There may be a slight amount of blood passed from the rectum  Gaseous discomfort- walking, belching will help relieve any discomfort  You may not operate a vehicle for 12 hours  You may not engage in an occupation involving machinery or appliances for rest of today  You may not drink alcoholic beverages for at least 12 hours  Avoid making any critical decisions for at least 24 hour  DIET:   Regular diet. - however -  remember your colon is empty and a heavy meal will produce gas. Avoid these foods:  vegetables, fried / greasy foods, carbonated drinks for today     ACTIVITY:  You may resume your normal daily activities it is recommended that you spend the remainder of the day resting -  avoid any strenuous activity. CALL M.D. ANY SIGN OF:   Increasing pain, nausea, vomiting  Abdominal distension (swelling)  New increased bleeding (oral or rectal)  Fever (chills)  Pain in chest area  Bloody discharge from nose or mouth  Shortness of breath     Follow-up Instructions:   Call Kiah Guzman MD if any questions or problems. Telephone # [phone]    COLONOSCOPY FINDINGS:  Your colonoscopy showed: Mass in the upper rectum near the sigmoid colon. Tattoo was seen. The mass was creating a partial blockage.     Patient Education on Sedation / Analgesia Administered for Procedure      For 24 hours after general anesthesia or intravenous analgesia / sedation:  Have someone responsible help you with your care  Limit your activities  Do not drive and operate hazardous machinery  Do not make important personal, legal or business decisions  Do not drink alcoholic beverages  If you have not urinated within 8 hours after discharge, please contact your physician  Resume your medications unless otherwise instructed    For 24 hours after general anesthesia or intravenous analgesia / sedation  you may experience:  Drowsiness, dizziness, sleepiness, or confusion  Difficulty remembering or delayed reaction times  Difficulty with your balance, especially while walking, move slowly and carefully, do not make sudden position changes  Difficulty focusing or blurred vision    You may not be aware of slight changes in your behavior and/or your reaction time because of the medication used during and after your procedure.     Report the following to your physician:  Excessive pain, swelling, redness or odor of or around the surgical area  Temperature over 100.5  Nausea and vomiting lasting longer than 4 hours or if unable to take medications  Any signs of decreased circulation or nerve impairment to extremity: change in color, persistent numbness, tingling, coldness or increase pain  Any questions or concerns    IF YOU REPORT TO AN EMERGENCY ROOM, DOCTOR'S OFFICE OR HOSPITAL WITHIN 24 HOURS AFTER YOUR PROCEDURE, BRING THIS SHEET AND YOUR AFTER VISIT SUMMARY WITH YOU AND GIVE IT TO THE PHYSICIAN OR NURSE ATTENDING YOU.

## 2022-10-19 NOTE — H&P
History and Physical    Patient: Tegan Latif MRN: [id number]  SSN: [id number]    YOB: 1975  Age: 49 y.o. Sex: female      Subjective:      Tegan Latif is a 49 y.o. female who presents for flex sig. Past Medical History:   Diagnosis Date    Hypercholesteremia     Ill-defined condition     endometriosis    Nausea & vomiting     PSVT (paroxysmal supraventricular tachycardia) (HCC)     Psychiatric disorder     anxiety and depression     Past Surgical History:   Procedure Laterality Date    HX GYN       x1    HX GYN      right salpingectomy    HX HEENT      tubes left ear    HX ORTHOPAEDIC      right mensicus and ACL      Family History   Problem Relation Age of Onset    Neuropathy Mother     Diabetes Mother     Cancer Father         prostate    Heart Disease Father     Stroke Father     High Cholesterol Father     Cancer Maternal Grandmother         pancreatic    Heart Disease Paternal Grandmother     Stroke Paternal Grandmother     Anxiety Child     Attention Deficit Hyperactivity Disorder Child     Other Child         autism and dyslexia     Social History     Tobacco Use    Smoking status: Never    Smokeless tobacco: Never   Substance Use Topics    Alcohol use: Yes     Comment: occasionally      Prior to Admission medications    Medication Sig Start Date End Date Taking? Authorizing Provider   atorvastatin (LIPITOR) 20 mg tablet Take 20 mg by mouth daily. Yes Provider, Historical   buPROPion XL (WELLBUTRIN XL) 300 mg XL tablet Take 300 mg by mouth daily. Yes Provider, Historical   LORazepam (ATIVAN) 1 mg tablet Take 1 mg by mouth every four (4) hours as needed for Anxiety. Yes Provider, Historical   dilTIAZem ER (CARDIZEM CD) 300 mg capsule Take 1 Capsule by mouth daily.  21  Yes Kianna Stuart NP   cyclobenzaprine (FLEXERIL) 10 mg tablet TAKE 1 TABLET BY MOUTH EVERY NIGHT AS NEEDED 20  Yes Provider, Historical   escitalopram oxalate (LEXAPRO) 5 mg tablet Take 1 by mouth daily. MUST MAKE APPOINTMENT FOR FURTHER REFILLS OR GET FURTHER FROM PCP 9/28/18  Yes Armani Cheung MD   ibuprofen (MOTRIN) 800 mg tablet Take 1 Tab by mouth every eight (8) hours as needed for Pain. 3/9/18  Yes Curtis Dominguez MD   diphenhydrAMINE (BENADRYL) 25 mg capsule Take 25 mg by mouth as needed. Alternating with Claritan. Yes Provider, Historical   loratadine (CLARITIN) 10 mg tablet Take 10 mg by mouth nightly. Yes Provider, Historical        Allergies   Allergen Reactions    Codeine Itching    Codeine Itching    Lipitor [Atorvastatin] Hives       Review of Systems:  A comprehensive review of systems was negative except for that written in the History of Present Illness. Objective:     Vitals:    10/19/22 0825   BP: (!) 146/88   Pulse: 75   Resp: 18   Temp: 98 °F (36.7 °C)   SpO2: 97%   Weight: 68.3 kg (150 lb 9.6 oz)   Height: 5' 6\" (1.676 m)        Physical Exam:  General:  Alert, cooperative, no distress, appears stated age. Eyes:  Conjunctivae/corneas clear. PERRL, EOMs intact. Fundi benign   Ears:  Normal TMs and external ear canals both ears. Nose: Nares normal. Septum midline. Mucosa normal. No drainage or sinus tenderness. Mouth/Throat: Lips, mucosa, and tongue normal. Teeth and gums normal.   Neck: Supple, symmetrical, trachea midline, no adenopathy, thyroid: no enlargment/tenderness/nodules, no carotid bruit and no JVD. Back:   Symmetric, no curvature. ROM normal. No CVA tenderness. Lungs:   Clear to auscultation bilaterally. Heart:  Regular rate and rhythm, S1, S2 normal, no murmur, click, rub or gallop. Abdomen:   Soft, non-tender. Bowel sounds normal. No masses,  No organomegaly. Extremities: Extremities normal, atraumatic, no cyanosis or edema. Pulses: 2+ and symmetric all extremities.    Skin: Skin color, texture, turgor normal. No rashes or lesions   Lymph nodes: Cervical, supraclavicular, and axillary nodes normal.   Neurologic: CNII-XII intact. Normal strength, sensation and reflexes throughout.        Assessment:     Hospital Problems  Date Reviewed: 10/19/2022   None      Plan:     Flex sig    Signed By: Radha Alaniz MD     October 19, 2022

## 2022-10-19 NOTE — ANESTHESIA PREPROCEDURE EVALUATION
Anesthetic History   No history of anesthetic complications  PONV          Review of Systems / Medical History  Patient summary reviewed, nursing notes reviewed and pertinent labs reviewed    Pulmonary  Within defined limits                 Neuro/Psych         Headaches (Post-concussion headache) and psychiatric history (Anxiety and Depression)    Comments: Post concussion syndrome     Cardiovascular            Dysrhythmias (PSVT (paroxysmal supraventricular tachycardia) ) : SVT  Hyperlipidemia    Exercise tolerance: >4 METS  Comments: ECG (11/1/21): Sinus  Rhythm   WITHIN NORMAL LIMITS    TTE (4/20/16):  Left ventricle: Systolic function was normal. Ejection fraction was  estimated in the range of 55 % to 60 %. There were no regional wall motion  abnormalities. Summary Measurements  CW measurements:  Aortic Valve:   AV MaxPG was 4.9 mmHg. AV Vmax was 1.1 m/s.    GI/Hepatic/Renal         Renal disease (CRI, Stage III): CRI      Comments: Rectal Cancer Endo/Other        Cancer (Rectal Cancer)     Other Findings              Physical Exam    Airway  Mallampati: II  TM Distance: 4 - 6 cm  Neck ROM: normal range of motion   Mouth opening: Normal     Cardiovascular  Regular rate and rhythm,  S1 and S2 normal,  no murmur, click, rub, or gallop             Dental    Dentition: Caps/crowns     Pulmonary  Breath sounds clear to auscultation               Abdominal  GI exam deferred       Other Findings            Anesthetic Plan    ASA: 2  Anesthesia type: MAC          Induction: Intravenous  Anesthetic plan and risks discussed with: Patient

## 2022-10-19 NOTE — PROCEDURES
Flexible Sigmoidoscopy Procedure Note    Procedure: Flexible Sigmoidoscopy    Pre-Procedure Diagnosis: RECTAL CANCER    Post-Procedure Diagnosis: Rectal cancer    Procedure in Detail:   Informed consent was obtained for the procedure. Risks of perforation and hemorrhage were discussed. The patient was placed in the left lateral decubitus position. The anal region was examined and a rectal exam was performed. Then the 60cm flexible sigmoidoscope was inserted and advanced without difficulty to a distance of 30 cm. The preparation was good. The instrument was withdrawn and retroflexed with good views throughout. Findings:  Partially obstructing mass at 22cm from the anal verge. Just at the turn between the sigmoid and rectum. Diverticulosis was noted proximal.  Circumferential tattoo was noted distal.      Specimens: None           EBL:  None    Complications:  None; patient tolerated the procedure well.     Impression:      - Rectal cancer    Recommendations:  Await results of MRI and discuss plan of care    Attending Attestation: I was present and scrubbed for the entire procedure

## 2022-10-19 NOTE — ANESTHESIA POSTPROCEDURE EVALUATION
Procedure(s): FLEXIBLE SIGMOIDOSCOPY. MAC    Anesthesia Post Evaluation        Patient location during evaluation: PACU  Note status: Adequate. Level of consciousness: responsive to verbal stimuli and sleepy but conscious  Pain management: satisfactory to patient  Airway patency: patent  Anesthetic complications: no  Cardiovascular status: acceptable  Respiratory status: acceptable  Hydration status: acceptable  Comments: +Post-Anesthesia Evaluation and Assessment    Patient: Panfilo Mejia MRN: [id number]  SSN: [id number]   YOB: 1975  Age: 49 y.o. Sex: female      Cardiovascular Function/Vital Signs    BP (!) 141/97   Pulse 66   Temp 36.6 °C (97.9 °F)   Resp 13   Ht 5' 6\" (1.676 m)   Wt 68.3 kg (150 lb 9.6 oz)   SpO2 97%   Breastfeeding No   BMI 24.31 kg/m²     Patient is status post Procedure(s): FLEXIBLE SIGMOIDOSCOPY. Nausea/Vomiting: Controlled. Postoperative hydration reviewed and adequate. Pain:  Pain Scale 1: Numeric (0 - 10) (10/19/22 0900)  Pain Intensity 1: 0 (10/19/22 0900)   Managed. Neurological Status: At baseline. Mental Status and Level of Consciousness: Arousable. Pulmonary Status:   O2 Device: None (Room air) (10/19/22 0900)   Adequate oxygenation and airway patent. Complications related to anesthesia: None    Post-anesthesia assessment completed. No concerns. Signed By: Lizandro Marshall MD    10/19/2022  Post anesthesia nausea and vomiting:  controlled      INITIAL Post-op Vital signs:   Vitals Value Taken Time   /99 10/19/22 0906   Temp 36.6 °C (97.9 °F) 10/19/22 0855   Pulse 62 10/19/22 0907   Resp 26 10/19/22 0907   SpO2 98 % 10/19/22 0907   Vitals shown include unvalidated device data.

## 2022-11-02 ENCOUNTER — HOSPITAL ENCOUNTER (OUTPATIENT)
Dept: GENERAL RADIOLOGY | Age: 47
Discharge: HOME | End: 2022-11-02
Attending: SURGERY
Payer: COMMERCIAL

## 2022-11-02 ENCOUNTER — HOSPITAL ENCOUNTER (OUTPATIENT)
Dept: PREADMISSION TESTING | Age: 47
Discharge: HOME | End: 2022-11-02
Payer: COMMERCIAL

## 2022-11-02 VITALS
WEIGHT: 152.78 LBS | RESPIRATION RATE: 16 BRPM | BODY MASS INDEX: 24.55 KG/M2 | OXYGEN SATURATION: 99 % | DIASTOLIC BLOOD PRESSURE: 64 MMHG | HEART RATE: 73 BPM | HEIGHT: 66 IN | TEMPERATURE: 98.6 F | SYSTOLIC BLOOD PRESSURE: 133 MMHG

## 2022-11-02 LAB
ABO + RH BLD: NORMAL
ALBUMIN SERPL-MCNC: 3.9 G/DL (ref 3.5–5)
ALBUMIN/GLOB SERPL: 1.2 {RATIO} (ref 1.1–2.2)
ALP SERPL-CCNC: 93 U/L (ref 45–117)
ALT SERPL-CCNC: 18 U/L (ref 12–78)
ANION GAP SERPL CALC-SCNC: 4 MMOL/L (ref 5–15)
APPEARANCE UR: CLEAR
APTT PPP: 28 SEC (ref 22.1–31)
AST SERPL-CCNC: 15 U/L (ref 15–37)
BACTERIA URNS QL MICRO: ABNORMAL /HPF
BILIRUB SERPL-MCNC: 0.3 MG/DL (ref 0.2–1)
BILIRUB UR QL: NEGATIVE
BLOOD GROUP ANTIBODIES SERPL: NORMAL
BUN SERPL-MCNC: 12 MG/DL (ref 6–20)
BUN/CREAT SERPL: 12 (ref 12–20)
CALCIUM SERPL-MCNC: 8.5 MG/DL (ref 8.5–10.1)
CEA SERPL-MCNC: 20.4 NG/ML
CHLORIDE SERPL-SCNC: 107 MMOL/L (ref 97–108)
CO2 SERPL-SCNC: 28 MMOL/L (ref 21–32)
COLOR UR: ABNORMAL
CREAT SERPL-MCNC: 0.97 MG/DL (ref 0.55–1.02)
EPITH CASTS URNS QL MICRO: ABNORMAL /LPF
ERYTHROCYTE [DISTWIDTH] IN BLOOD BY AUTOMATED COUNT: 12.8 % (ref 11.5–14.5)
EST. AVERAGE GLUCOSE BLD GHB EST-MCNC: 94 MG/DL
GLOBULIN SER CALC-MCNC: 3.3 G/DL (ref 2–4)
GLUCOSE SERPL-MCNC: 72 MG/DL (ref 65–100)
GLUCOSE UR STRIP.AUTO-MCNC: NEGATIVE MG/DL
HBA1C MFR BLD: 4.9 % (ref 4–5.6)
HCT VFR BLD AUTO: 42.5 % (ref 35–47)
HGB BLD-MCNC: 14.1 G/DL (ref 11.5–16)
HGB UR QL STRIP: NEGATIVE
HYALINE CASTS URNS QL MICRO: ABNORMAL /LPF (ref 0–2)
INR PPP: 1 (ref 0.9–1.1)
KETONES UR QL STRIP.AUTO: ABNORMAL MG/DL
LEUKOCYTE ESTERASE UR QL STRIP.AUTO: ABNORMAL
MAGNESIUM SERPL-MCNC: 2.1 MG/DL (ref 1.6–2.4)
MCH RBC QN AUTO: 32.6 PG (ref 26–34)
MCHC RBC AUTO-ENTMCNC: 33.2 G/DL (ref 30–36.5)
MCV RBC AUTO: 98.2 FL (ref 80–99)
NITRITE UR QL STRIP.AUTO: NEGATIVE
NRBC # BLD: 0 K/UL (ref 0–0.01)
NRBC BLD-RTO: 0 PER 100 WBC
PH UR STRIP: 6 [PH] (ref 5–8)
PHOSPHATE SERPL-MCNC: 3 MG/DL (ref 2.6–4.7)
PLATELET # BLD AUTO: 264 K/UL (ref 150–400)
PMV BLD AUTO: 10.5 FL (ref 8.9–12.9)
POTASSIUM SERPL-SCNC: 3.2 MMOL/L (ref 3.5–5.1)
PROT SERPL-MCNC: 7.2 G/DL (ref 6.4–8.2)
PROT UR STRIP-MCNC: ABNORMAL MG/DL
PROTHROMBIN TIME: 10.4 SEC (ref 9–11.1)
RBC # BLD AUTO: 4.33 M/UL (ref 3.8–5.2)
RBC #/AREA URNS HPF: ABNORMAL /HPF (ref 0–5)
SODIUM SERPL-SCNC: 139 MMOL/L (ref 136–145)
SP GR UR REFRACTOMETRY: 1.02
SPECIMEN EXP DATE BLD: NORMAL
THERAPEUTIC RANGE,PTTT: NORMAL SECS (ref 58–77)
UA: UC IF INDICATED,UAUC: ABNORMAL
UROBILINOGEN UR QL STRIP.AUTO: 1 EU/DL (ref 0.2–1)
WBC # BLD AUTO: 7.5 K/UL (ref 3.6–11)
WBC URNS QL MICRO: ABNORMAL /HPF (ref 0–4)

## 2022-11-02 PROCEDURE — 71046 X-RAY EXAM CHEST 2 VIEWS: CPT

## 2022-11-02 PROCEDURE — 82378 CARCINOEMBRYONIC ANTIGEN: CPT

## 2022-11-02 PROCEDURE — 85027 COMPLETE CBC AUTOMATED: CPT

## 2022-11-02 PROCEDURE — 85730 THROMBOPLASTIN TIME PARTIAL: CPT

## 2022-11-02 PROCEDURE — 93005 ELECTROCARDIOGRAM TRACING: CPT

## 2022-11-02 PROCEDURE — 84100 ASSAY OF PHOSPHORUS: CPT

## 2022-11-02 PROCEDURE — 80053 COMPREHEN METABOLIC PANEL: CPT

## 2022-11-02 PROCEDURE — 83036 HEMOGLOBIN GLYCOSYLATED A1C: CPT

## 2022-11-02 PROCEDURE — 36415 COLL VENOUS BLD VENIPUNCTURE: CPT

## 2022-11-02 PROCEDURE — 87086 URINE CULTURE/COLONY COUNT: CPT

## 2022-11-02 PROCEDURE — 81001 URINALYSIS AUTO W/SCOPE: CPT

## 2022-11-02 PROCEDURE — 85610 PROTHROMBIN TIME: CPT

## 2022-11-02 PROCEDURE — 86900 BLOOD TYPING SEROLOGIC ABO: CPT

## 2022-11-02 PROCEDURE — 83735 ASSAY OF MAGNESIUM: CPT

## 2022-11-02 RX ORDER — CELECOXIB 200 MG/1
200 CAPSULE ORAL ONCE
Status: CANCELLED | OUTPATIENT
Start: 2022-11-10 | End: 2022-11-10

## 2022-11-02 RX ORDER — METRONIDAZOLE 500 MG/1
TABLET ORAL 3 TIMES DAILY
COMMUNITY
Start: 2022-11-09 | End: 2022-11-13

## 2022-11-02 RX ORDER — POLYETHYLENE GLYCOL 3350, SODIUM SULFATE ANHYDROUS, SODIUM BICARBONATE, SODIUM CHLORIDE, POTASSIUM CHLORIDE 236; 22.74; 6.74; 5.86; 2.97 G/4L; G/4L; G/4L; G/4L; G/4L
POWDER, FOR SOLUTION ORAL
COMMUNITY
Start: 2022-11-09 | End: 2022-11-13

## 2022-11-02 RX ORDER — SODIUM CHLORIDE, SODIUM LACTATE, POTASSIUM CHLORIDE, CALCIUM CHLORIDE 600; 310; 30; 20 MG/100ML; MG/100ML; MG/100ML; MG/100ML
25 INJECTION, SOLUTION INTRAVENOUS CONTINUOUS
Status: CANCELLED | OUTPATIENT
Start: 2022-11-10

## 2022-11-02 RX ORDER — NEOMYCIN SULFATE 500 MG/1
500 TABLET ORAL 3 TIMES DAILY
COMMUNITY
End: 2022-11-13

## 2022-11-02 RX ORDER — SODIUM CHLORIDE, SODIUM LACTATE, POTASSIUM CHLORIDE, CALCIUM CHLORIDE 600; 310; 30; 20 MG/100ML; MG/100ML; MG/100ML; MG/100ML
75 INJECTION, SOLUTION INTRAVENOUS CONTINUOUS
Status: CANCELLED | OUTPATIENT
Start: 2022-11-10 | End: 2022-11-10

## 2022-11-02 RX ORDER — GABAPENTIN 300 MG/1
600 CAPSULE ORAL ONCE
Status: CANCELLED | OUTPATIENT
Start: 2022-11-10 | End: 2022-11-10

## 2022-11-02 RX ORDER — ACETAMINOPHEN 500 MG
1000 TABLET ORAL ONCE
Status: CANCELLED | OUTPATIENT
Start: 2022-11-10 | End: 2022-11-10

## 2022-11-02 RX ORDER — ACETAMINOPHEN 500 MG
1000 TABLET ORAL
COMMUNITY

## 2022-11-02 RX ORDER — PRAVASTATIN SODIUM 20 MG/1
20 TABLET ORAL
COMMUNITY

## 2022-11-02 RX ORDER — METOCLOPRAMIDE 10 MG/1
10 TABLET ORAL 3 TIMES DAILY
COMMUNITY
Start: 2022-11-09 | End: 2022-11-13

## 2022-11-02 NOTE — PERIOP NOTES

## 2022-11-02 NOTE — PERIOP NOTES
[facility]  Preoperative Instructions    Surgery Date 11/10/2022          Time of Arrival to be called  Contact # [phone]    1. On the day of your surgery, please report to the Surgical Services Registration Desk and sign in at your designated time. The Surgery Center is located to the right of the Emergency Room. 2. You must have someone with you to drive you home. You should not drive a car for 24 hours following surgery. Please make arrangements for a friend or family member to stay with you for the first 24 hours after your surgery. 3. Refer to your handbook for instructions on drinking liquids prior to surgery. 4. We recommend you do not drink any alcoholic beverages for 24 hours before and after your surgery. 5. Contact your surgeons office for instructions on the following medications: non-steroidal anti-inflammatory drugs (i.e. Advil, Aleve), vitamins, and supplements. (Some surgeons will want you to stop these medications prior to surgery and others may allow you to take them)  **If you are currently taking Plavix, Coumadin, Aspirin and/or other blood-thinning agents, contact your surgeon for instructions. ** Your surgeon will partner with the physician prescribing these medications to determine if it is safe to stop or if you need to continue taking. Please do not stop taking these medications without instructions from your surgeon    6. Wear comfortable clothes. Wear glasses instead of contacts. Do not bring any money or jewelry. Please bring picture ID, insurance card, and any prearranged co-payment or hospital payment. Do not wear make-up, particularly mascara the morning of your surgery. Do not wear nail polish, particularly if you are having foot /hand surgery. Wear your hair loose or down, no ponytails, buns, brian pins or clips. All body piercings must be removed.   Please shower with antibacterial soap for three consecutive days before and on the morning of surgery, but do not apply any lotions, powders or deodorants after the shower on the day of surgery. Please use a fresh towels after each shower. Please sleep in clean clothes and change bed linens the night before surgery. Please do not shave for 48 hours prior to surgery. Shaving of the face is acceptable. 7. You should understand that if you do not follow these instructions your surgery may be cancelled. If your physical condition changes (I.e. fever, cold or flu) please contact your surgeon as soon as possible. 8. It is important that you be on time. If a situation occurs where you may be late, please call [phone] (OR Holding Area). 9. If you have any questions and or problems, please call [phone] (Pre-admission Testing). 10. Your surgery time may be subject to change. You will receive a phone call the evening prior with your arrival time. 11.  If having outpatient surgery, you must have someone to drive you here, stay with you during the duration of your stay, and to drive you home at time of discharge. Special Instructions:    TAKE ALL MEDICATIONS THE DAY OF SURGERY EXCEPT: none      I understand a pre-operative phone call will be made to verify my surgery time. In the event that I am not available, I give permission for a message to be left on my answering service and/or with another person?   yes         ___________________        __________   11/2/2022 @ 3399    (Signature of Patient)             (Witness)                (Date and Time)

## 2022-11-02 NOTE — PERIOP NOTES

## 2022-11-02 NOTE — PERIOP NOTES
Hibiclens/Chlorhexidine    Preventing Infections Before and After - Your Surgery    IMPORTANT INSTRUCTIONS    Please read and follow these instructions carefully. If you are unable to comply with the below instructions your procedure will be cancelled. Every Night for Three (3) nights before your surgery:  Shower with an antibacterial soap, such as Dial, or the soap provided at your preassessment appointment. A shower is better than a bath for cleaning your skin. If needed, ask someone to help you reach all areas of your body. Dont forget to clean your belly button with every shower. The night before your surgery: If you lose your Hibiclens/chlorhexidine please contact surgery center or you can purchase it at a local pharmacy  On the night before your surgery, shower with an antibacterial soap, such as Dial, or the soap provided at your preassessment appointment. With one packet of Hibiclens/Chlorhexidine in hand, turn water off. Apply Hibiclens antiseptic skin cleanser with a clean, freshly washed washcloth. Gently apply to your body from chin to toes (except the genital area) and especially the area(s) where your incision(s) will be. Leave Hibiclens/Chlorhexidine on your skin for at least 20 seconds. CAUTION: If needed, Hibiclens/chlorhexidine may be used to clean the folds of skin of the legs (such as in the area of the groin) and on your buttocks and hips. However, do not use Hibiclens/Chlorhexidine above the neck or in the genital area (your bottom) or put inside any area of your body. Turn the water back on and rinse. Dry gently with a clean, freshly washed towel. After your shower, do not use any powder, deodorant, perfumes or lotion. Use clean, freshly washed towels and washcloths every time you shower. Wear clean, freshly washed pajamas to bed the night before surgery. Sleep on clean, freshly washed sheets. Do not allow pets to sleep in your bed with you.         The Morning of your surgery:  Shower again thoroughly with an antibacterial soap, such as Dial or the soap provided at your preassessment appointment. If needed, ask someone for help to reach all areas of your body. Dont forget to clean your belly button! Rinse. Dry gently with a clean, freshly washed towel. After your shower, do not use any powder, deodorant, perfumes or lotion prior to surgery. Put on clean, freshly washed clothing. Tips to help prevent infections after your surgery:  Protect your surgical wound from germs:  Hand washing is the most important thing you and your caregivers can do to prevent infections. Keep your bandage clean and dry! Do not touch your surgical wound. Use clean, freshly washed towels and washcloths every time you shower; do not share bath linens with others. Until your surgical wound is healed, wear clothing and sleep on bed linens each day that are clean and freshly washed. Do not allow pets to sleep in your bed with you or touch your surgical wound. Do not smoke - smoking delays wound healing. This may be a good time to stop smoking. If you have diabetes, it is important for you to manage your blood sugar levels properly before your surgery as well as after your surgery. Poorly managed blood sugar levels slow down wound healing and prevent you from healing completely. If you lose your Hibiclens/chlorhexidine, please call the [facility], or it is available for purchase at your pharmacy.                ___________________      ___________________      11/2/2022 @ 1700  (Signature of Patient)          (Witness)                   (Date and Time)

## 2022-11-02 NOTE — PERIOP NOTES
[facility] [employer] [place]) book was reviewed with the patient during the pre-admission testing (PAT) appointment. An opportunity for questions was provided, patient verbalized understanding.       Bowel Prep: PEG 3350    Pre-op Abx: Flagyl, Reglan, Neomycin

## 2022-11-03 LAB
ATRIAL RATE: 57 BPM
BACTERIA SPEC CULT: NORMAL
CALCULATED P AXIS, ECG09: 70 DEGREES
CALCULATED R AXIS, ECG10: 40 DEGREES
CALCULATED T AXIS, ECG11: 54 DEGREES
DIAGNOSIS, 93000: NORMAL
P-R INTERVAL, ECG05: 138 MS
Q-T INTERVAL, ECG07: 442 MS
QRS DURATION, ECG06: 82 MS
QTC CALCULATION (BEZET), ECG08: 430 MS
SERVICE CMNT-IMP: NORMAL
VENTRICULAR RATE, ECG03: 57 BPM

## 2022-11-03 NOTE — PERIOP NOTES
Fax to Dr. Buck Martinez- pre-op meds have not been called in, please review K+ results, urine culture pending. Rec'd return fax from Dr. Anselmo Delgado called into pharmacy, no treatment needed for K+, wait for cx results for tx. Called to Ramya Fritz at Dr. Juan De La Garza office, LVM - UA culture resulted, no growth. 11/8/2022:   Received fax from [facility], Potassium and Magnesium rechecked on 11/8/2022, results on chart.

## 2022-11-04 LAB
BACTERIA SPEC CULT: NORMAL
BACTERIA SPEC CULT: NORMAL
SERVICE CMNT-IMP: NORMAL

## 2022-11-04 NOTE — PERIOP NOTES
Pt's PCP made aware of K+ level. Fax received stating pt to start taking KCl 20 meq per day and have a repeat lab drawn on Monday 11/7.

## 2022-11-10 ENCOUNTER — HOSPITAL ENCOUNTER (INPATIENT)
Age: 47
LOS: 3 days | Discharge: HOME | DRG: 331 | End: 2022-11-13
Attending: SURGERY | Admitting: SURGERY
Payer: COMMERCIAL

## 2022-11-10 ENCOUNTER — ANESTHESIA EVENT (OUTPATIENT)
Dept: SURGERY | Age: 47
DRG: 331 | End: 2022-11-10
Payer: COMMERCIAL

## 2022-11-10 ENCOUNTER — ANESTHESIA (OUTPATIENT)
Dept: SURGERY | Age: 47
DRG: 331 | End: 2022-11-10
Payer: COMMERCIAL

## 2022-11-10 DIAGNOSIS — R52 PAIN: Primary | ICD-10-CM

## 2022-11-10 PROBLEM — C18.9 COLON CANCER (HCC): Status: ACTIVE | Noted: 2022-11-10

## 2022-11-10 PROCEDURE — 77030035278 HC STPLR SEAL ENDOWR INTU -B: Performed by: SURGERY

## 2022-11-10 PROCEDURE — 77030035029 HC NDL INSUF VERES DISP COVD -B: Performed by: SURGERY

## 2022-11-10 PROCEDURE — 74011250636 HC RX REV CODE- 250/636: Performed by: NURSE ANESTHETIST, CERTIFIED REGISTERED

## 2022-11-10 PROCEDURE — 77030035489 HC REDUCR CANN ENDOWR INTU -C: Performed by: SURGERY

## 2022-11-10 PROCEDURE — 74011250637 HC RX REV CODE- 250/637: Performed by: SURGERY

## 2022-11-10 PROCEDURE — 2709999900 HC NON-CHARGEABLE SUPPLY: Performed by: SURGERY

## 2022-11-10 PROCEDURE — 77030026438 HC STYL ET INTUB CARD -A: Performed by: ANESTHESIOLOGY

## 2022-11-10 PROCEDURE — 77030008684 HC TU ET CUF COVD -B: Performed by: ANESTHESIOLOGY

## 2022-11-10 PROCEDURE — 74011250636 HC RX REV CODE- 250/636: Performed by: ANESTHESIOLOGY

## 2022-11-10 PROCEDURE — 77030010285 HC STPLR INT PRSTRNG COVD -B: Performed by: SURGERY

## 2022-11-10 PROCEDURE — 77030008606 HC TRCR ENDOSC KII AMR -B: Performed by: SURGERY

## 2022-11-10 PROCEDURE — 77030031139 HC SUT VCRL2 J&J -A: Performed by: SURGERY

## 2022-11-10 PROCEDURE — 77030035277 HC OBTRTR BLDELSS DISP INTU -B: Performed by: SURGERY

## 2022-11-10 PROCEDURE — 8E0W4CZ ROBOTIC ASSISTED PROCEDURE OF TRUNK REGION, PERCUTANEOUS ENDOSCOPIC APPROACH: ICD-10-PCS | Performed by: SURGERY

## 2022-11-10 PROCEDURE — 77030035279 HC SEAL VSL ENDOWR XI INTU -I2: Performed by: SURGERY

## 2022-11-10 PROCEDURE — 76010000876 HC OR TIME 2 TO 2.5HR INTENSV - TIER 2: Performed by: SURGERY

## 2022-11-10 PROCEDURE — 77030008756 HC TU IRR SUC STRY -B: Performed by: SURGERY

## 2022-11-10 PROCEDURE — 77030034667 HC ACC PLATFRM ENDO GELPNT AMR -E: Performed by: SURGERY

## 2022-11-10 PROCEDURE — 77030002933 HC SUT MCRYL J&J -A: Performed by: SURGERY

## 2022-11-10 PROCEDURE — 74011000258 HC RX REV CODE- 258: Performed by: NURSE ANESTHETIST, CERTIFIED REGISTERED

## 2022-11-10 PROCEDURE — 88360 TUMOR IMMUNOHISTOCHEM/MANUAL: CPT

## 2022-11-10 PROCEDURE — 77030013079 HC BLNKT BAIR HGGR 3M -A: Performed by: ANESTHESIOLOGY

## 2022-11-10 PROCEDURE — 77030005513 HC CATH URETH FOL11 MDII -B: Performed by: SURGERY

## 2022-11-10 PROCEDURE — 0DJD8ZZ INSPECTION OF LOWER INTESTINAL TRACT, VIA NATURAL OR ARTIFICIAL OPENING ENDOSCOPIC: ICD-10-PCS | Performed by: SURGERY

## 2022-11-10 PROCEDURE — 77030002986 HC SUT PROL J&J -A: Performed by: SURGERY

## 2022-11-10 PROCEDURE — 88309 TISSUE EXAM BY PATHOLOGIST: CPT

## 2022-11-10 PROCEDURE — 76060000035 HC ANESTHESIA 2 TO 2.5 HR: Performed by: SURGERY

## 2022-11-10 PROCEDURE — 74011000250 HC RX REV CODE- 250: Performed by: NURSE ANESTHETIST, CERTIFIED REGISTERED

## 2022-11-10 PROCEDURE — 65270000029 HC RM PRIVATE

## 2022-11-10 PROCEDURE — 74011250636 HC RX REV CODE- 250/636: Performed by: SURGERY

## 2022-11-10 PROCEDURE — 76210000016 HC OR PH I REC 1 TO 1.5 HR: Performed by: SURGERY

## 2022-11-10 PROCEDURE — 77030016151 HC PROTCTR LNS DFOG COVD -B: Performed by: SURGERY

## 2022-11-10 PROCEDURE — 77030002966 HC SUT PDS J&J -A: Performed by: SURGERY

## 2022-11-10 PROCEDURE — 74011000250 HC RX REV CODE- 250: Performed by: SURGERY

## 2022-11-10 PROCEDURE — 77030040923 HC STPLR ENDOSC ECHELON J&J -E: Performed by: SURGERY

## 2022-11-10 PROCEDURE — 88305 TISSUE EXAM BY PATHOLOGIST: CPT

## 2022-11-10 PROCEDURE — 77030040259 HC STPLR DEV SUREFORM DVNCI INTU -F: Performed by: SURGERY

## 2022-11-10 PROCEDURE — 77030018673: Performed by: SURGERY

## 2022-11-10 PROCEDURE — 77030025171 HC FCPS ENDOSC DISP 2 J&J -B: Performed by: SURGERY

## 2022-11-10 PROCEDURE — 74011250636 HC RX REV CODE- 250/636: Performed by: STUDENT IN AN ORGANIZED HEALTH CARE EDUCATION/TRAINING PROGRAM

## 2022-11-10 PROCEDURE — 0DTN4ZZ RESECTION OF SIGMOID COLON, PERCUTANEOUS ENDOSCOPIC APPROACH: ICD-10-PCS | Performed by: SURGERY

## 2022-11-10 PROCEDURE — 77030020703 HC SEAL CANN DISP INTU -B: Performed by: SURGERY

## 2022-11-10 RX ORDER — SODIUM CHLORIDE 0.9 % (FLUSH) 0.9 %
5-40 SYRINGE (ML) INJECTION EVERY 8 HOURS
Status: DISCONTINUED | OUTPATIENT
Start: 2022-11-10 | End: 2022-11-13 | Stop reason: HOSPADM

## 2022-11-10 RX ORDER — OXYCODONE HYDROCHLORIDE 5 MG/1
10 TABLET ORAL
Status: DISCONTINUED | OUTPATIENT
Start: 2022-11-10 | End: 2022-11-11

## 2022-11-10 RX ORDER — LIDOCAINE HYDROCHLORIDE ANHYDROUS AND DEXTROSE MONOHYDRATE .8; 5 G/100ML; G/100ML
0.5 INJECTION, SOLUTION INTRAVENOUS CONTINUOUS
Status: DISPENSED | OUTPATIENT
Start: 2022-11-10 | End: 2022-11-11

## 2022-11-10 RX ORDER — GABAPENTIN 300 MG/1
600 CAPSULE ORAL ONCE
Status: COMPLETED | OUTPATIENT
Start: 2022-11-10 | End: 2022-11-10

## 2022-11-10 RX ORDER — SODIUM CHLORIDE, SODIUM LACTATE, POTASSIUM CHLORIDE, CALCIUM CHLORIDE 600; 310; 30; 20 MG/100ML; MG/100ML; MG/100ML; MG/100ML
75 INJECTION, SOLUTION INTRAVENOUS CONTINUOUS
Status: DISCONTINUED | OUTPATIENT
Start: 2022-11-10 | End: 2022-11-10 | Stop reason: HOSPADM

## 2022-11-10 RX ORDER — SUCCINYLCHOLINE CHLORIDE 20 MG/ML
INJECTION INTRAMUSCULAR; INTRAVENOUS AS NEEDED
Status: DISCONTINUED | OUTPATIENT
Start: 2022-11-10 | End: 2022-11-10 | Stop reason: HOSPADM

## 2022-11-10 RX ORDER — ONDANSETRON 2 MG/ML
INJECTION INTRAMUSCULAR; INTRAVENOUS AS NEEDED
Status: DISCONTINUED | OUTPATIENT
Start: 2022-11-10 | End: 2022-11-10 | Stop reason: HOSPADM

## 2022-11-10 RX ORDER — ONDANSETRON 2 MG/ML
4 INJECTION INTRAMUSCULAR; INTRAVENOUS
Status: DISCONTINUED | OUTPATIENT
Start: 2022-11-10 | End: 2022-11-13 | Stop reason: HOSPADM

## 2022-11-10 RX ORDER — DEXAMETHASONE SODIUM PHOSPHATE 4 MG/ML
INJECTION, SOLUTION INTRA-ARTICULAR; INTRALESIONAL; INTRAMUSCULAR; INTRAVENOUS; SOFT TISSUE AS NEEDED
Status: DISCONTINUED | OUTPATIENT
Start: 2022-11-10 | End: 2022-11-10 | Stop reason: HOSPADM

## 2022-11-10 RX ORDER — DILTIAZEM HYDROCHLORIDE 300 MG/1
300 CAPSULE, COATED, EXTENDED RELEASE ORAL EVERY EVENING
Status: DISCONTINUED | OUTPATIENT
Start: 2022-11-10 | End: 2022-11-13 | Stop reason: HOSPADM

## 2022-11-10 RX ORDER — ONDANSETRON 2 MG/ML
4 INJECTION INTRAMUSCULAR; INTRAVENOUS AS NEEDED
Status: DISCONTINUED | OUTPATIENT
Start: 2022-11-10 | End: 2022-11-10 | Stop reason: HOSPADM

## 2022-11-10 RX ORDER — POTASSIUM CHLORIDE 750 MG/1
20 TABLET, FILM COATED, EXTENDED RELEASE ORAL DAILY
Status: DISCONTINUED | OUTPATIENT
Start: 2022-11-11 | End: 2022-11-13 | Stop reason: HOSPADM

## 2022-11-10 RX ORDER — LIDOCAINE HYDROCHLORIDE ANHYDROUS AND DEXTROSE MONOHYDRATE .8; 5 G/100ML; G/100ML
INJECTION, SOLUTION INTRAVENOUS
Status: DISCONTINUED | OUTPATIENT
Start: 2022-11-10 | End: 2022-11-10 | Stop reason: HOSPADM

## 2022-11-10 RX ORDER — OXYCODONE HYDROCHLORIDE 5 MG/1
5 TABLET ORAL
Status: DISCONTINUED | OUTPATIENT
Start: 2022-11-10 | End: 2022-11-11

## 2022-11-10 RX ORDER — SODIUM CHLORIDE, SODIUM LACTATE, POTASSIUM CHLORIDE, CALCIUM CHLORIDE 600; 310; 30; 20 MG/100ML; MG/100ML; MG/100ML; MG/100ML
25 INJECTION, SOLUTION INTRAVENOUS CONTINUOUS
Status: DISCONTINUED | OUTPATIENT
Start: 2022-11-10 | End: 2022-11-10 | Stop reason: HOSPADM

## 2022-11-10 RX ORDER — MAGNESIUM SULFATE HEPTAHYDRATE 40 MG/ML
INJECTION, SOLUTION INTRAVENOUS AS NEEDED
Status: DISCONTINUED | OUTPATIENT
Start: 2022-11-10 | End: 2022-11-10 | Stop reason: HOSPADM

## 2022-11-10 RX ORDER — PRAVASTATIN SODIUM 10 MG/1
20 TABLET ORAL
Status: DISCONTINUED | OUTPATIENT
Start: 2022-11-10 | End: 2022-11-13 | Stop reason: HOSPADM

## 2022-11-10 RX ORDER — SODIUM CHLORIDE, SODIUM LACTATE, POTASSIUM CHLORIDE, CALCIUM CHLORIDE 600; 310; 30; 20 MG/100ML; MG/100ML; MG/100ML; MG/100ML
INJECTION, SOLUTION INTRAVENOUS
Status: DISCONTINUED | OUTPATIENT
Start: 2022-11-10 | End: 2022-11-10 | Stop reason: HOSPADM

## 2022-11-10 RX ORDER — ACETAMINOPHEN 325 MG/1
650 TABLET ORAL EVERY 6 HOURS
Status: DISCONTINUED | OUTPATIENT
Start: 2022-11-10 | End: 2022-11-13 | Stop reason: HOSPADM

## 2022-11-10 RX ORDER — FENTANYL CITRATE 50 UG/ML
INJECTION, SOLUTION INTRAMUSCULAR; INTRAVENOUS AS NEEDED
Status: DISCONTINUED | OUTPATIENT
Start: 2022-11-10 | End: 2022-11-10 | Stop reason: HOSPADM

## 2022-11-10 RX ORDER — SODIUM CHLORIDE 0.9 % (FLUSH) 0.9 %
5-40 SYRINGE (ML) INJECTION AS NEEDED
Status: DISCONTINUED | OUTPATIENT
Start: 2022-11-10 | End: 2022-11-13 | Stop reason: HOSPADM

## 2022-11-10 RX ORDER — BUPIVACAINE HYDROCHLORIDE AND EPINEPHRINE 2.5; 5 MG/ML; UG/ML
INJECTION, SOLUTION EPIDURAL; INFILTRATION; INTRACAUDAL; PERINEURAL AS NEEDED
Status: DISCONTINUED | OUTPATIENT
Start: 2022-11-10 | End: 2022-11-10 | Stop reason: HOSPADM

## 2022-11-10 RX ORDER — ESCITALOPRAM OXALATE 10 MG/1
10 TABLET ORAL EVERY EVENING
Status: DISCONTINUED | OUTPATIENT
Start: 2022-11-10 | End: 2022-11-13 | Stop reason: HOSPADM

## 2022-11-10 RX ORDER — ENOXAPARIN SODIUM 100 MG/ML
40 INJECTION SUBCUTANEOUS DAILY
Status: DISCONTINUED | OUTPATIENT
Start: 2022-11-11 | End: 2022-11-13 | Stop reason: HOSPADM

## 2022-11-10 RX ORDER — MIDAZOLAM HYDROCHLORIDE 1 MG/ML
INJECTION, SOLUTION INTRAMUSCULAR; INTRAVENOUS AS NEEDED
Status: DISCONTINUED | OUTPATIENT
Start: 2022-11-10 | End: 2022-11-10 | Stop reason: HOSPADM

## 2022-11-10 RX ORDER — ACETAMINOPHEN 500 MG
1000 TABLET ORAL ONCE
Status: COMPLETED | OUTPATIENT
Start: 2022-11-10 | End: 2022-11-10

## 2022-11-10 RX ORDER — OXYCODONE HYDROCHLORIDE 5 MG/1
5 TABLET ORAL AS NEEDED
Status: DISCONTINUED | OUTPATIENT
Start: 2022-11-10 | End: 2022-11-10 | Stop reason: HOSPADM

## 2022-11-10 RX ORDER — HYDROMORPHONE HYDROCHLORIDE 1 MG/ML
0.2 INJECTION, SOLUTION INTRAMUSCULAR; INTRAVENOUS; SUBCUTANEOUS
Status: DISCONTINUED | OUTPATIENT
Start: 2022-11-10 | End: 2022-11-10 | Stop reason: HOSPADM

## 2022-11-10 RX ORDER — HYDROMORPHONE HYDROCHLORIDE 1 MG/ML
0.5 INJECTION, SOLUTION INTRAMUSCULAR; INTRAVENOUS; SUBCUTANEOUS
Status: DISCONTINUED | OUTPATIENT
Start: 2022-11-10 | End: 2022-11-11

## 2022-11-10 RX ORDER — FENTANYL CITRATE 50 UG/ML
25 INJECTION, SOLUTION INTRAMUSCULAR; INTRAVENOUS
Status: COMPLETED | OUTPATIENT
Start: 2022-11-10 | End: 2022-11-10

## 2022-11-10 RX ORDER — SODIUM CHLORIDE, SODIUM LACTATE, POTASSIUM CHLORIDE, CALCIUM CHLORIDE 600; 310; 30; 20 MG/100ML; MG/100ML; MG/100ML; MG/100ML
75 INJECTION, SOLUTION INTRAVENOUS CONTINUOUS
Status: DISCONTINUED | OUTPATIENT
Start: 2022-11-10 | End: 2022-11-12

## 2022-11-10 RX ORDER — ONDANSETRON 4 MG/1
4 TABLET, ORALLY DISINTEGRATING ORAL
Status: DISCONTINUED | OUTPATIENT
Start: 2022-11-10 | End: 2022-11-13 | Stop reason: HOSPADM

## 2022-11-10 RX ORDER — HYDROMORPHONE HYDROCHLORIDE 1 MG/ML
0.25 INJECTION, SOLUTION INTRAMUSCULAR; INTRAVENOUS; SUBCUTANEOUS
Status: DISCONTINUED | OUTPATIENT
Start: 2022-11-10 | End: 2022-11-11

## 2022-11-10 RX ORDER — CELECOXIB 200 MG/1
200 CAPSULE ORAL ONCE
Status: COMPLETED | OUTPATIENT
Start: 2022-11-10 | End: 2022-11-10

## 2022-11-10 RX ORDER — KETOROLAC TROMETHAMINE 30 MG/ML
15 INJECTION, SOLUTION INTRAMUSCULAR; INTRAVENOUS EVERY 6 HOURS
Status: DISCONTINUED | OUTPATIENT
Start: 2022-11-10 | End: 2022-11-13 | Stop reason: HOSPADM

## 2022-11-10 RX ORDER — KETAMINE HYDROCHLORIDE 10 MG/ML
INJECTION, SOLUTION INTRAMUSCULAR; INTRAVENOUS AS NEEDED
Status: DISCONTINUED | OUTPATIENT
Start: 2022-11-10 | End: 2022-11-10 | Stop reason: HOSPADM

## 2022-11-10 RX ORDER — VECURONIUM BROMIDE FOR INJECTION 1 MG/ML
INJECTION, POWDER, LYOPHILIZED, FOR SOLUTION INTRAVENOUS AS NEEDED
Status: DISCONTINUED | OUTPATIENT
Start: 2022-11-10 | End: 2022-11-10 | Stop reason: HOSPADM

## 2022-11-10 RX ORDER — PROPOFOL 10 MG/ML
INJECTION, EMULSION INTRAVENOUS
Status: DISCONTINUED | OUTPATIENT
Start: 2022-11-10 | End: 2022-11-10 | Stop reason: HOSPADM

## 2022-11-10 RX ORDER — LIDOCAINE HYDROCHLORIDE 20 MG/ML
INJECTION, SOLUTION EPIDURAL; INFILTRATION; INTRACAUDAL; PERINEURAL AS NEEDED
Status: DISCONTINUED | OUTPATIENT
Start: 2022-11-10 | End: 2022-11-10 | Stop reason: HOSPADM

## 2022-11-10 RX ORDER — DEXMEDETOMIDINE HYDROCHLORIDE 100 UG/ML
INJECTION, SOLUTION INTRAVENOUS AS NEEDED
Status: DISCONTINUED | OUTPATIENT
Start: 2022-11-10 | End: 2022-11-10 | Stop reason: HOSPADM

## 2022-11-10 RX ORDER — LORAZEPAM 1 MG/1
1 TABLET ORAL
Status: DISCONTINUED | OUTPATIENT
Start: 2022-11-10 | End: 2022-11-13 | Stop reason: HOSPADM

## 2022-11-10 RX ORDER — PROPOFOL 10 MG/ML
INJECTION, EMULSION INTRAVENOUS AS NEEDED
Status: DISCONTINUED | OUTPATIENT
Start: 2022-11-10 | End: 2022-11-10 | Stop reason: HOSPADM

## 2022-11-10 RX ORDER — BUPROPION HYDROCHLORIDE 150 MG/1
300 TABLET ORAL DAILY
Status: DISCONTINUED | OUTPATIENT
Start: 2022-11-11 | End: 2022-11-13 | Stop reason: HOSPADM

## 2022-11-10 RX ADMIN — PROPOFOL 150 MG: 10 INJECTION, EMULSION INTRAVENOUS at 14:59

## 2022-11-10 RX ADMIN — Medication 1000 MG: at 11:56

## 2022-11-10 RX ADMIN — FENTANYL CITRATE 50 MCG: 50 INJECTION, SOLUTION INTRAMUSCULAR; INTRAVENOUS at 14:59

## 2022-11-10 RX ADMIN — SODIUM CHLORIDE, POTASSIUM CHLORIDE, SODIUM LACTATE AND CALCIUM CHLORIDE 75 ML/HR: 600; 310; 30; 20 INJECTION, SOLUTION INTRAVENOUS at 19:07

## 2022-11-10 RX ADMIN — CEFOXITIN 2 G: 1 INJECTION, POWDER, FOR SOLUTION INTRAVENOUS at 15:20

## 2022-11-10 RX ADMIN — ONDANSETRON HYDROCHLORIDE 4 MG: 2 INJECTION, SOLUTION INTRAMUSCULAR; INTRAVENOUS at 17:12

## 2022-11-10 RX ADMIN — FENTANYL CITRATE 25 MCG: 50 INJECTION, SOLUTION INTRAMUSCULAR; INTRAVENOUS at 18:12

## 2022-11-10 RX ADMIN — FENTANYL CITRATE 25 MCG: 50 INJECTION, SOLUTION INTRAMUSCULAR; INTRAVENOUS at 18:02

## 2022-11-10 RX ADMIN — FENTANYL CITRATE 25 MCG: 50 INJECTION, SOLUTION INTRAMUSCULAR; INTRAVENOUS at 18:07

## 2022-11-10 RX ADMIN — SODIUM CHLORIDE, POTASSIUM CHLORIDE, SODIUM LACTATE AND CALCIUM CHLORIDE: 600; 310; 30; 20 INJECTION, SOLUTION INTRAVENOUS at 16:57

## 2022-11-10 RX ADMIN — ESCITALOPRAM OXALATE 10 MG: 10 TABLET ORAL at 19:06

## 2022-11-10 RX ADMIN — Medication 3 AMPULE: at 11:56

## 2022-11-10 RX ADMIN — LIDOCAINE HYDROCHLORIDE 1.5 MG/KG/HR: 8 INJECTION, SOLUTION INTRAVENOUS at 15:04

## 2022-11-10 RX ADMIN — SODIUM CHLORIDE, SODIUM LACTATE, POTASSIUM CHLORIDE, CALCIUM CHLORIDE: 600; 310; 30; 20 INJECTION, SOLUTION INTRAVENOUS at 14:59

## 2022-11-10 RX ADMIN — PROPOFOL 50 MG: 10 INJECTION, EMULSION INTRAVENOUS at 16:37

## 2022-11-10 RX ADMIN — SODIUM CHLORIDE, POTASSIUM CHLORIDE, SODIUM LACTATE AND CALCIUM CHLORIDE: 600; 310; 30; 20 INJECTION, SOLUTION INTRAVENOUS at 17:04

## 2022-11-10 RX ADMIN — DEXAMETHASONE SODIUM PHOSPHATE 4 MG: 4 INJECTION, SOLUTION INTRAMUSCULAR; INTRAVENOUS at 15:39

## 2022-11-10 RX ADMIN — FENTANYL CITRATE 50 MCG: 50 INJECTION, SOLUTION INTRAMUSCULAR; INTRAVENOUS at 15:08

## 2022-11-10 RX ADMIN — PROPOFOL 50 MCG/KG/MIN: 10 INJECTION, EMULSION INTRAVENOUS at 16:15

## 2022-11-10 RX ADMIN — DEXMEDETOMIDINE HYDROCHLORIDE 10 MCG: 100 INJECTION, SOLUTION, CONCENTRATE INTRAVENOUS at 15:43

## 2022-11-10 RX ADMIN — CELECOXIB 200 MG: 200 CAPSULE ORAL at 11:56

## 2022-11-10 RX ADMIN — MEPERIDINE HYDROCHLORIDE 12.5 MG: 25 INJECTION INTRAMUSCULAR; INTRAVENOUS; SUBCUTANEOUS at 17:51

## 2022-11-10 RX ADMIN — FENTANYL CITRATE 25 MCG: 50 INJECTION, SOLUTION INTRAMUSCULAR; INTRAVENOUS at 18:17

## 2022-11-10 RX ADMIN — VECURONIUM BROMIDE 2 MG: 10 INJECTION, POWDER, LYOPHILIZED, FOR SOLUTION INTRAVENOUS at 15:44

## 2022-11-10 RX ADMIN — LIDOCAINE HYDROCHLORIDE ANHYDROUS AND DEXTROSE MONOHYDRATE 0.5 MG/KG/HR: .8; 5 INJECTION, SOLUTION INTRAVENOUS at 19:00

## 2022-11-10 RX ADMIN — KETOROLAC TROMETHAMINE 15 MG: 30 INJECTION, SOLUTION INTRAMUSCULAR; INTRAVENOUS at 19:06

## 2022-11-10 RX ADMIN — ONDANSETRON HYDROCHLORIDE 4 MG: 2 INJECTION, SOLUTION INTRAMUSCULAR; INTRAVENOUS at 15:39

## 2022-11-10 RX ADMIN — SUCCINYLCHOLINE CHLORIDE 200 MG: 20 INJECTION, SOLUTION INTRAMUSCULAR; INTRAVENOUS at 14:57

## 2022-11-10 RX ADMIN — Medication 2 G: at 15:34

## 2022-11-10 RX ADMIN — DEXMEDETOMIDINE HYDROCHLORIDE 10 MCG: 100 INJECTION, SOLUTION, CONCENTRATE INTRAVENOUS at 16:39

## 2022-11-10 RX ADMIN — DEXMEDETOMIDINE HYDROCHLORIDE 10 MCG: 100 INJECTION, SOLUTION, CONCENTRATE INTRAVENOUS at 15:32

## 2022-11-10 RX ADMIN — SUGAMMADEX 200 MG: 100 INJECTION, SOLUTION INTRAVENOUS at 17:15

## 2022-11-10 RX ADMIN — SODIUM CHLORIDE, PRESERVATIVE FREE 10 ML: 5 INJECTION INTRAVENOUS at 21:43

## 2022-11-10 RX ADMIN — PROPOFOL 50 MG: 10 INJECTION, EMULSION INTRAVENOUS at 17:00

## 2022-11-10 RX ADMIN — KETAMINE HYDROCHLORIDE 30 MG: 10 INJECTION, SOLUTION INTRAMUSCULAR; INTRAVENOUS at 16:21

## 2022-11-10 RX ADMIN — LORAZEPAM 1 MG: 1 TABLET ORAL at 21:38

## 2022-11-10 RX ADMIN — SODIUM CHLORIDE, POTASSIUM CHLORIDE, SODIUM LACTATE AND CALCIUM CHLORIDE: 600; 310; 30; 20 INJECTION, SOLUTION INTRAVENOUS at 14:55

## 2022-11-10 RX ADMIN — VECURONIUM BROMIDE 2 MG: 10 INJECTION, POWDER, LYOPHILIZED, FOR SOLUTION INTRAVENOUS at 15:56

## 2022-11-10 RX ADMIN — PRAVASTATIN SODIUM 20 MG: 10 TABLET ORAL at 21:38

## 2022-11-10 RX ADMIN — FENTANYL CITRATE 100 MCG: 50 INJECTION, SOLUTION INTRAMUSCULAR; INTRAVENOUS at 15:31

## 2022-11-10 RX ADMIN — KETAMINE HYDROCHLORIDE 20 MG: 10 INJECTION, SOLUTION INTRAMUSCULAR; INTRAVENOUS at 15:08

## 2022-11-10 RX ADMIN — GABAPENTIN 600 MG: 300 CAPSULE ORAL at 11:56

## 2022-11-10 RX ADMIN — LIDOCAINE HYDROCHLORIDE 100 MG: 20 INJECTION, SOLUTION EPIDURAL; INFILTRATION; INTRACAUDAL; PERINEURAL at 14:59

## 2022-11-10 RX ADMIN — MIDAZOLAM HYDROCHLORIDE 2 MG: 1 INJECTION, SOLUTION INTRAMUSCULAR; INTRAVENOUS at 14:52

## 2022-11-10 RX ADMIN — SODIUM CHLORIDE, POTASSIUM CHLORIDE, SODIUM LACTATE AND CALCIUM CHLORIDE 25 ML/HR: 600; 310; 30; 20 INJECTION, SOLUTION INTRAVENOUS at 12:03

## 2022-11-10 RX ADMIN — DILTIAZEM HYDROCHLORIDE 300 MG: 300 CAPSULE, COATED, EXTENDED RELEASE ORAL at 19:57

## 2022-11-10 RX ADMIN — ACETAMINOPHEN 650 MG: 325 TABLET ORAL at 19:06

## 2022-11-10 NOTE — OP NOTES
Operative Report    Patient: Roldan Davison MRN: [id number]  SSN: [id number]    YOB: 1975  Age: 49 y.o. Sex: female       Date of Surgery: 11/10/2022     Preoperative Diagnosis: COLON CA     Postoperative Diagnosis: COLON CA     Surgeon(s) and Role:     * Shelia Waddell MD - Primary    Anesthesia: General     Procedure: Procedure(s):  ROBOTIC ASSISTED LAPAROSCOPIC SIGMOID COLECTOMY WITH INTRAOPERATIVE FLEXIBLE SIGMOIDOSCOPY ( E R A S)     Procedure in Detail:   The patient was taken to the operating suite and placed in the supine position. General endotracheal anesthesia was induced. The patient was placed in the lithotomy position. The abdomen was prepped and draped in the usual sterile fashion. A timeout was performed as per hospital protocol. An 8mm incision was made in the left upper quadrant and a Veress needle was inserted into the abdominal cavity. The abdomen was insufflated. An 8mm robotic trocar was placed into the abdominal cavity under direct visualization using a 5mm laparoscope. A second 8mm robotic trocar was placed in the right mid abdomen and a 12mm robotic trocar was placed in the right lower quadrant. A 5mm assistant trocar was placed in the right upper quadrant. It was through these 4 sites that the entirety of the procedure was completed. The patient was placed in the steep Trendelenburg position with left side up and the robot was docked. The liver appeared normal without any masses. The tattoo marks were identified in the distal sigmoid colon with a mass in between. The sigmoid colon was elevated identifying the mesentery overlying the sacral promontory. This mesentery was scored along the medial edge. The avascular plane between the sigmoid colon mesentery and retroperitoneum was identified. The left ureter was identified and swept posteriorly away from the dissection plane.   Dissection was continued in a medial-to-lateral plane freeing up the posterior aspect of the mesentery away from the retroperitoneum. The inferior mesenteric artery and vein were skeletonized using a high ligation technique, and they were divided separately using the Vessel Sealer by cauterizing once proximally, once distally and dividing in between. The white line of Toldt was divided all the way up along the lateral peritoneal reflection of the descending colon. This dissection was carried out all the way to the splenic flexure. The distal dissection margin was identified at proximal rectum about 5cm distal to the mass. The mesentery was divided to this level. The rectum was divided using a robotic 60mm green load stapler. The proximal margin was identified between the descending and sigmoid colon at least 5cm proximal to the proximal tattoo deion. The mesentery was divided to this level. After complete mobilization of the descending colon, the proximal margin was easily able to reach the pelvis deeply without any undue tension. Anesthesia injected ICG dye to confirm excellent blood supply to the proximal and distal margins. The robot was undocked. The right lower quadrant was extended and a mini wound protector was placed. The bowel was exteriorized through this incision and the area toweled off. An automatic pursestring suture was applied at the proximal margin. The bowel was divided and sent to pathology for further examination. A 29mm EEA anvil was placed into the colotomy and the pursestring suture was tied down. The pericolic fat was cleared off the anvil and a second pursestring suture was placed using 2-0 Prolene. The dirty instruments were removed and gloves were changed. The anvil was dropped back into the abdominal cavity and the abdomen was reinsufflated. EEA sizers were placed transanally up to the transverse staple line. This was followed by the 29 EEA stapler. The spike was deployed and the anvil was mated with the spike.   The stapler was closed and fired. Two complete donuts were identified and sent to pathology for further examination. A flexible sigmoidoscopy was then performed by inserting an Olympus colonoscopy transanally up to the anastomosis. The anastomosis was gently insufflated while simultaneously irrigating the pelvis. There was pink healthy mucosa on both sides of the anastomosis, and there were no evidence of leak. The circular staple line was completely intact. The colonoscope was withdrawn. The right lower quadrant incision was closed with 2-0 Vicryl along the peritoneum followed by 0 looped PDS suture along the fascia. All the skin incisions were closed with 4-0 Monocryl. 30ml of 0.25% Marcaine with epinephrine was injected subcutaneously. The patient was awakened, extubated, and taken to the recovery room in stable condition. Estimated Blood Loss:  50ml    Implants: * No implants in log *            Specimens:   ID Type Source Tests Collected by Time Destination   1 : distal ring Preservative Sigmoid  Emiliano Dorantes MD 11/10/2022 1554 Pathology   2 : proximal ring Preservative Sigmoid  Emiliano Dorantes MD 11/10/2022 1554 Pathology   3 : sigmoid colon proximal rectum Preservative Lilian Dorantes MD 11/10/2022 1554 Pathology           Drains: None                Complications: None    Counts: Sponge and needle counts were correct times two.     Signed By:  Thelma Patterson MD     November 10, 2022

## 2022-11-10 NOTE — ANESTHESIA POSTPROCEDURE EVALUATION
Procedure(s):  ROBOTIC ASSISTED LAPAROSCOPIC SIGMOID COLECTOMY WITH INTRAOPERATIVE FLEXIBLE SIGMOIDOSCOPY ( E R A S). general    Anesthesia Post Evaluation      Multimodal analgesia: multimodal analgesia used between 6 hours prior to anesthesia start to PACU discharge  Patient location during evaluation: PACU  Patient participation: complete - patient participated  Level of consciousness: sleepy but conscious and responsive to verbal stimuli  Pain score: 3  Pain management: adequate  Airway patency: patent  Anesthetic complications: no  Cardiovascular status: acceptable  Respiratory status: acceptable  Hydration status: acceptable  Comments: +Post-Anesthesia Evaluation and Assessment    Patient: Carlos Weaver MRN: [id number]  SSN: [id number]   YOB: 1975  Age: 49 y.o. Sex: female      Cardiovascular Function/Vital Signs    /78   Pulse 85   Temp 36.5 °C (97.7 °F)   Resp 14   Ht 5' 6.25\" (1.683 m)   Wt 67.3 kg (148 lb 5.9 oz)   SpO2 97%   BMI 23.77 kg/m²     Patient is status post Procedure(s):  ROBOTIC ASSISTED LAPAROSCOPIC SIGMOID COLECTOMY WITH INTRAOPERATIVE FLEXIBLE SIGMOIDOSCOPY ( E R A S). Nausea/Vomiting: Controlled. Postoperative hydration reviewed and adequate. Pain:  Pain Scale 1: Numeric (0 - 10) (11/10/22 1124)  Pain Intensity 1: 0 (11/10/22 1124)   Managed. Neurological Status:   Neuro (WDL): Within Defined Limits (11/10/22 1139)   At baseline. Mental Status and Level of Consciousness: Arousable. Pulmonary Status:   O2 Device: Nasal cannula;Oral airway (11/10/22 1728)   Adequate oxygenation and airway patent. Complications related to anesthesia: None    Post-anesthesia assessment completed. No concerns.     Signed By: Christin Vyas MD    11/10/2022  Post anesthesia nausea and vomiting:  controlled  Final Post Anesthesia Temperature Assessment:  Normothermia (36.0-37.5 degrees C)      INITIAL Post-op Vital signs:   Vitals Value Taken Time   BP 143/79 11/10/22 1750   Temp 36.5 °C (97.7 °F) 11/10/22 1728   Pulse 75 11/10/22 1753   Resp 18 11/10/22 1753   SpO2 97 % 11/10/22 1753   Vitals shown include unvalidated device data.

## 2022-11-10 NOTE — PROGRESS NOTES
TRANSFER - OUT REPORT:    Verbal report given to NICHO Salinas(name) on Warren Mullen  being transferred to [facility](unit) for routine post - op       Report consisted of patients Situation, Background, Assessment and   Recommendations(SBAR). Information from the following report(s) SBAR, Kardex, OR Summary, and MAR was reviewed with the receiving nurse. Lines:   Peripheral IV 11/10/22 Anterior; Left Forearm (Active)   Site Assessment Clean, dry, & intact 11/10/22 1728   Phlebitis Assessment 0 11/10/22 1728   Dressing Status Clean, dry, & intact 11/10/22 1202   Dressing Type Transparent;Tape 11/10/22 1202   Hub Color/Line Status Infusing;Pink 11/10/22 1202       Peripheral IV 11/10/22 Anterior;Right Forearm (Active)   Site Assessment Clean, dry, & intact 11/10/22 1728   Phlebitis Assessment 0 11/10/22 1728   Dressing Status Clean, dry, & intact 11/10/22 1202   Dressing Type Transparent;Tape 11/10/22 1202   Hub Color/Line Status Pink;Flushed 11/10/22 1202        Opportunity for questions and clarification was provided.       Patient transported with:   O2 @ 2 liters  Registered Nurse

## 2022-11-10 NOTE — PROGRESS NOTES
TRANSFER - IN REPORT:    Verbal report received from [facility] and Fransico Christie, CRNA and student(name) on Tawanda Dy  being received from OR(unit) for routine post - op      Report consisted of patients Situation, Background, Assessment and   Recommendations(SBAR). Information from the following report(s) SBAR, Kardex, OR Summary, and MAR was reviewed with the receiving nurse. Opportunity for questions and clarification was provided. Assessment completed upon patients arrival to unit and care assumed.

## 2022-11-10 NOTE — ANESTHESIA PREPROCEDURE EVALUATION
Anesthetic History   No history of anesthetic complications  PONV          Review of Systems / Medical History  Patient summary reviewed, nursing notes reviewed and pertinent labs reviewed    Pulmonary  Within defined limits                 Neuro/Psych   Within defined limits      Headaches (Post-concussion headache) and psychiatric history (Anxiety and Depression)    Comments: Post concussion syndrome     Cardiovascular  Within defined limits          Dysrhythmias (PSVT (paroxysmal supraventricular tachycardia) ) : SVT  Hyperlipidemia    Exercise tolerance: >4 METS  Comments: ECG (11/1/21): Sinus  Rhythm   WITHIN NORMAL LIMITS    TTE (4/20/16):  Left ventricle: Systolic function was normal. Ejection fraction was  estimated in the range of 55 % to 60 %. There were no regional wall motion  abnormalities. Summary Measurements  CW measurements:  Aortic Valve:   AV MaxPG was 4.9 mmHg. AV Vmax was 1.1 m/s.    GI/Hepatic/Renal  Within defined limits       Renal disease (CRI, Stage III): CRI      Comments: Rectal Cancer Endo/Other  Within defined limits      Cancer (Rectal Cancer)  Pertinent negatives: No obesity   Other Findings   Comments: Drug use: Marijuana         Physical Exam    Airway  Mallampati: II  TM Distance: 4 - 6 cm  Neck ROM: normal range of motion   Mouth opening: Normal     Cardiovascular  Regular rate and rhythm,  S1 and S2 normal,  no murmur, click, rub, or gallop             Dental    Dentition: Caps/crowns     Pulmonary  Breath sounds clear to auscultation               Abdominal  GI exam deferred       Other Findings            Anesthetic Plan    ASA: 2  Anesthesia type: general    Monitoring Plan: BIS      Induction: Intravenous  Anesthetic plan and risks discussed with: Patient

## 2022-11-11 LAB
ANION GAP SERPL CALC-SCNC: 5 MMOL/L (ref 5–15)
BASOPHILS # BLD: 0 K/UL (ref 0–0.1)
BASOPHILS NFR BLD: 0 % (ref 0–1)
BUN SERPL-MCNC: 8 MG/DL (ref 6–20)
BUN/CREAT SERPL: 9 (ref 12–20)
CALCIUM SERPL-MCNC: 8.3 MG/DL (ref 8.5–10.1)
CHLORIDE SERPL-SCNC: 107 MMOL/L (ref 97–108)
CO2 SERPL-SCNC: 26 MMOL/L (ref 21–32)
CREAT SERPL-MCNC: 0.87 MG/DL (ref 0.55–1.02)
DIFFERENTIAL METHOD BLD: ABNORMAL
EOSINOPHIL # BLD: 0 K/UL (ref 0–0.4)
EOSINOPHIL NFR BLD: 0 % (ref 0–7)
ERYTHROCYTE [DISTWIDTH] IN BLOOD BY AUTOMATED COUNT: 12.6 % (ref 11.5–14.5)
GLUCOSE SERPL-MCNC: 119 MG/DL (ref 65–100)
HCT VFR BLD AUTO: 40.7 % (ref 35–47)
HGB BLD-MCNC: 13.4 G/DL (ref 11.5–16)
IMM GRANULOCYTES # BLD AUTO: 0 K/UL (ref 0–0.04)
IMM GRANULOCYTES NFR BLD AUTO: 0 % (ref 0–0.5)
LYMPHOCYTES # BLD: 1.1 K/UL (ref 0.8–3.5)
LYMPHOCYTES NFR BLD: 7 % (ref 12–49)
MCH RBC QN AUTO: 33 PG (ref 26–34)
MCHC RBC AUTO-ENTMCNC: 32.9 G/DL (ref 30–36.5)
MCV RBC AUTO: 100.2 FL (ref 80–99)
MONOCYTES # BLD: 0.6 K/UL (ref 0–1)
MONOCYTES NFR BLD: 4 % (ref 5–13)
NEUTS SEG # BLD: 14.2 K/UL (ref 1.8–8)
NEUTS SEG NFR BLD: 89 % (ref 32–75)
NRBC # BLD: 0 K/UL (ref 0–0.01)
NRBC BLD-RTO: 0 PER 100 WBC
PLATELET # BLD AUTO: 245 K/UL (ref 150–400)
POTASSIUM SERPL-SCNC: 4 MMOL/L (ref 3.5–5.1)
RBC # BLD AUTO: 4.06 M/UL (ref 3.8–5.2)
RBC MORPH BLD: ABNORMAL
SODIUM SERPL-SCNC: 138 MMOL/L (ref 136–145)
WBC # BLD AUTO: 15.9 K/UL (ref 3.6–11)

## 2022-11-11 PROCEDURE — 74011250636 HC RX REV CODE- 250/636: Performed by: SURGERY

## 2022-11-11 PROCEDURE — 65270000029 HC RM PRIVATE

## 2022-11-11 PROCEDURE — 74011000250 HC RX REV CODE- 250: Performed by: SURGERY

## 2022-11-11 PROCEDURE — 80048 BASIC METABOLIC PNL TOTAL CA: CPT

## 2022-11-11 PROCEDURE — 85025 COMPLETE CBC W/AUTO DIFF WBC: CPT

## 2022-11-11 PROCEDURE — 36415 COLL VENOUS BLD VENIPUNCTURE: CPT

## 2022-11-11 PROCEDURE — 74011250637 HC RX REV CODE- 250/637: Performed by: SURGERY

## 2022-11-11 RX ORDER — MORPHINE SULFATE 2 MG/ML
2 INJECTION, SOLUTION INTRAMUSCULAR; INTRAVENOUS
Status: DISCONTINUED | OUTPATIENT
Start: 2022-11-11 | End: 2022-11-13 | Stop reason: HOSPADM

## 2022-11-11 RX ORDER — DIPHENHYDRAMINE HYDROCHLORIDE 50 MG/ML
25 INJECTION, SOLUTION INTRAMUSCULAR; INTRAVENOUS
Status: DISCONTINUED | OUTPATIENT
Start: 2022-11-11 | End: 2022-11-13 | Stop reason: HOSPADM

## 2022-11-11 RX ORDER — TRAMADOL HYDROCHLORIDE 50 MG/1
50 TABLET ORAL
Status: DISCONTINUED | OUTPATIENT
Start: 2022-11-11 | End: 2022-11-13 | Stop reason: HOSPADM

## 2022-11-11 RX ORDER — OXYCODONE HYDROCHLORIDE 5 MG/1
5 TABLET ORAL
Qty: 15 TABLET | Refills: 0 | Status: SHIPPED | OUTPATIENT
Start: 2022-11-11 | End: 2022-11-14

## 2022-11-11 RX ADMIN — LORAZEPAM 1 MG: 1 TABLET ORAL at 22:05

## 2022-11-11 RX ADMIN — DILTIAZEM HYDROCHLORIDE 300 MG: 300 CAPSULE, COATED, EXTENDED RELEASE ORAL at 17:12

## 2022-11-11 RX ADMIN — ENOXAPARIN SODIUM 40 MG: 100 INJECTION SUBCUTANEOUS at 08:59

## 2022-11-11 RX ADMIN — DIPHENHYDRAMINE HYDROCHLORIDE 25 MG: 50 INJECTION, SOLUTION INTRAMUSCULAR; INTRAVENOUS at 17:09

## 2022-11-11 RX ADMIN — BUPROPION HYDROCHLORIDE 300 MG: 150 TABLET, EXTENDED RELEASE ORAL at 08:59

## 2022-11-11 RX ADMIN — SODIUM CHLORIDE, PRESERVATIVE FREE 10 ML: 5 INJECTION INTRAVENOUS at 13:04

## 2022-11-11 RX ADMIN — HYDROMORPHONE HYDROCHLORIDE 0.5 MG: 1 INJECTION, SOLUTION INTRAMUSCULAR; INTRAVENOUS; SUBCUTANEOUS at 13:06

## 2022-11-11 RX ADMIN — DIPHENHYDRAMINE HYDROCHLORIDE 25 MG: 50 INJECTION, SOLUTION INTRAMUSCULAR; INTRAVENOUS at 22:05

## 2022-11-11 RX ADMIN — POTASSIUM CHLORIDE 20 MEQ: 750 TABLET, FILM COATED, EXTENDED RELEASE ORAL at 08:59

## 2022-11-11 RX ADMIN — ACETAMINOPHEN 650 MG: 325 TABLET ORAL at 12:58

## 2022-11-11 RX ADMIN — SODIUM CHLORIDE, POTASSIUM CHLORIDE, SODIUM LACTATE AND CALCIUM CHLORIDE 75 ML/HR: 600; 310; 30; 20 INJECTION, SOLUTION INTRAVENOUS at 09:13

## 2022-11-11 RX ADMIN — ACETAMINOPHEN 650 MG: 325 TABLET ORAL at 20:23

## 2022-11-11 RX ADMIN — ACETAMINOPHEN 650 MG: 325 TABLET ORAL at 06:05

## 2022-11-11 RX ADMIN — HYDROMORPHONE HYDROCHLORIDE 0.25 MG: 1 INJECTION, SOLUTION INTRAMUSCULAR; INTRAVENOUS; SUBCUTANEOUS at 09:12

## 2022-11-11 RX ADMIN — ESCITALOPRAM OXALATE 10 MG: 10 TABLET ORAL at 17:12

## 2022-11-11 RX ADMIN — PRAVASTATIN SODIUM 20 MG: 10 TABLET ORAL at 22:05

## 2022-11-11 RX ADMIN — SODIUM CHLORIDE, PRESERVATIVE FREE 10 ML: 5 INJECTION INTRAVENOUS at 22:57

## 2022-11-11 RX ADMIN — ACETAMINOPHEN 650 MG: 325 TABLET ORAL at 00:30

## 2022-11-11 RX ADMIN — KETOROLAC TROMETHAMINE 15 MG: 30 INJECTION, SOLUTION INTRAMUSCULAR; INTRAVENOUS at 00:30

## 2022-11-11 RX ADMIN — KETOROLAC TROMETHAMINE 15 MG: 30 INJECTION, SOLUTION INTRAMUSCULAR; INTRAVENOUS at 12:58

## 2022-11-11 RX ADMIN — TRAMADOL HYDROCHLORIDE 50 MG: 50 TABLET, COATED ORAL at 22:05

## 2022-11-11 RX ADMIN — KETOROLAC TROMETHAMINE 15 MG: 30 INJECTION, SOLUTION INTRAMUSCULAR; INTRAVENOUS at 05:28

## 2022-11-11 RX ADMIN — KETOROLAC TROMETHAMINE 15 MG: 30 INJECTION, SOLUTION INTRAMUSCULAR; INTRAVENOUS at 17:15

## 2022-11-11 RX ADMIN — SODIUM CHLORIDE, POTASSIUM CHLORIDE, SODIUM LACTATE AND CALCIUM CHLORIDE 75 ML/HR: 600; 310; 30; 20 INJECTION, SOLUTION INTRAVENOUS at 22:14

## 2022-11-11 NOTE — PROGRESS NOTES
Nutrition Note    Chart reviewed; RD provided a 5-day supply of Ensure Surgery/Immunonutrition (10 bottles) to the bedside per ERAS protocol and discussed the importance of adequate nutrition/supplement compliance in effort to optimize wound healing and recovery from surgery. Pt agreeable and reports that she enjoyed the Ensure Surgery pre-op. RD anticipates good compliance once nausea resolves.      Electronically signed by Terrence Trotter RD, [address]  on 11/11/2022 at 2:43 PM    Contact: ext [phone]

## 2022-11-11 NOTE — PROGRESS NOTES
Problem: Pain  Goal: *Control of Pain  Outcome: Progressing Towards Goal  Goal: *PALLIATIVE CARE:  Alleviation of Pain  Outcome: Progressing Towards Goal  Goal: *Ability to perform ADLs and demonstrates progressive mobility and function  Outcome: Progressing Towards Goal     Problem: Patient Education: Go to Patient Education Activity  Goal: Patient/Family Education  Outcome: Progressing Towards Goal     Problem: Falls - Risk of  Goal: *Absence of Falls  Description: Document Jeanie Litter Fall Risk and appropriate interventions in the flowsheet.   Outcome: Progressing Towards Goal  Note: Fall Risk Interventions:  Mobility Interventions: Patient to call before getting OOB              Elimination Interventions: Call light in reach           Goal: *Ability to perform ADLs and demonstrates progressive mobility and function  Outcome: Progressing Towards Goal     Problem: Patient Education: Go to Patient Education Activity  Goal: Patient/Family Education  Outcome: Progressing Towards Goal     Problem: General Medical Care Plan  Goal: *Vital signs within specified parameters  Outcome: Progressing Towards Goal  Goal: *Absence of infection signs and symptoms  Outcome: Progressing Towards Goal  Goal: *Optimal pain control at patient's stated goal  Outcome: Progressing Towards Goal  Goal: *Skin integrity maintained  Outcome: Progressing Towards Goal  Goal: *Fluid volume balance  Outcome: Progressing Towards Goal  Goal: *Optimize nutritional status  Outcome: Progressing Towards Goal  Goal: *Anxiety reduced or absent  Outcome: Progressing Towards Goal  Goal: *Progressive mobility and function (eg: ADL's)  Outcome: Progressing Towards Goal     Problem: Patient Education: Go to Patient Education Activity  Goal: Patient/Family Education  Outcome: Progressing Towards Goal

## 2022-11-11 NOTE — PROGRESS NOTES
Karri of Shift Note    Bedside shift change report given to Yokasta Burroughs  (oncoming nurse) by Nadine Baez RN (offgoing nurse). Report included the following information SBAR, Kardex, MAR, and Recent Results    Shift worked:  7p-7a     Shift summary and any significant changes:     Pt return from PACU medicated for pain pt states can not take hyro-morphine without Benadryl took dose no reaction stated feel itch.tolerate routine meds Toradol and tylenol Voided x 1 at begin of shift. HOB up call bell in reach. Concerns for physician to address:  none     Zone phone for oncoming shift:   [phone]       Activity:  Activity Level: Up with Assistance  Number times ambulated in hallways past shift: 0  Number of times OOB to chair past shift: 0    Cardiac:   Cardiac Monitoring: No      Cardiac Rhythm: Sinus Rhythm    Access:  Current line(s): PIV     Genitourinary:   Urinary status: voiding    Respiratory:   O2 Device: None (Room air)  Chronic home O2 use?: NO  Incentive spirometer at bedside: YES       GI:  Last Bowel Movement Date: 11/10/22  Current diet:  ADULT DIET Regular; Low Fiber  Passing flatus: YES  Tolerating current diet: YES       Pain Management:   Patient states pain is manageable on current regimen: YES    Skin:  Juan Score: 20  Interventions: increase time out of bed    Patient Safety:  Fall Score:  Total Score: 2  Interventions: gripper socks and pt to call before getting OOB       Length of Stay:  Expected LOS: - - -  Actual LOS: [address], RN

## 2022-11-11 NOTE — DISCHARGE INSTRUCTIONS
Deshawn Andrews MD, [address] [facility] Julianna Hunt MD, [address] [facility] Davidson Phan MD, [facility]  Mello Wilson. Frankey Baston, MD, MD Josi Joseph MD Pasquale Radish, MD      Discharge Instructions for [facility] Col[facility] Surgery Patients       Do not lift any objects weighing more than 10 pounds for 4 weeks. Do not do any housework including vacuuming, scrubbing or yardwork for 4 weeks. Do not drive for two weeks or while taking sedating medications. You may walk as desired and go up and down stairs as needed. You may shower. Do not take tub baths, swim or use hot tubs for 4 weeks. Leave steri-strips on incision. They will fall off on their own. You may have dermabond on your incisions which is surgical glue. This will dissolve over time. Do not scrub around incisions. Follow low-fiber diet for 2 weeks. (See handbook for additional details). Drink nutritional supplements 2 times per day until your diet and appetite are back to normal. Diabetic patients should drink one-half bottle 4 times daily. Multiple bowel movements are normal each day. Contact your surgeons office for any concerns. Take Tramadol every 6 hours if needed for pain that is not controlled by tylenol alone    Follow up with providers as scheduled. If your surgery involves an ostomy bag, please bring your supplies to the first office visit with your surgeon. If you experience fever (greater than 100.5), chills, vomiting or redness or drainage at surgical site, please contact your surgeons office. For questions regarding quality or quantity of ostomy output, refer to ERAS handbook or call surgeons office. Please see handbook for additional instructions. If you have further questions, please call your surgeons office.

## 2022-11-12 LAB
ANION GAP SERPL CALC-SCNC: 3 MMOL/L (ref 5–15)
BASOPHILS # BLD: 0 K/UL (ref 0–0.1)
BASOPHILS NFR BLD: 0 % (ref 0–1)
BUN SERPL-MCNC: 11 MG/DL (ref 6–20)
BUN/CREAT SERPL: 11 (ref 12–20)
CALCIUM SERPL-MCNC: 8.1 MG/DL (ref 8.5–10.1)
CHLORIDE SERPL-SCNC: 109 MMOL/L (ref 97–108)
CO2 SERPL-SCNC: 29 MMOL/L (ref 21–32)
CREAT SERPL-MCNC: 0.99 MG/DL (ref 0.55–1.02)
DIFFERENTIAL METHOD BLD: ABNORMAL
EOSINOPHIL # BLD: 0.1 K/UL (ref 0–0.4)
EOSINOPHIL NFR BLD: 1 % (ref 0–7)
ERYTHROCYTE [DISTWIDTH] IN BLOOD BY AUTOMATED COUNT: 12.8 % (ref 11.5–14.5)
GLUCOSE SERPL-MCNC: 110 MG/DL (ref 65–100)
HCT VFR BLD AUTO: 35.1 % (ref 35–47)
HGB BLD-MCNC: 11.4 G/DL (ref 11.5–16)
IMM GRANULOCYTES # BLD AUTO: 0 K/UL (ref 0–0.04)
IMM GRANULOCYTES NFR BLD AUTO: 1 % (ref 0–0.5)
LYMPHOCYTES # BLD: 1.9 K/UL (ref 0.8–3.5)
LYMPHOCYTES NFR BLD: 25 % (ref 12–49)
MCH RBC QN AUTO: 32.7 PG (ref 26–34)
MCHC RBC AUTO-ENTMCNC: 32.5 G/DL (ref 30–36.5)
MCV RBC AUTO: 100.6 FL (ref 80–99)
MONOCYTES # BLD: 0.4 K/UL (ref 0–1)
MONOCYTES NFR BLD: 6 % (ref 5–13)
NEUTS SEG # BLD: 5.1 K/UL (ref 1.8–8)
NEUTS SEG NFR BLD: 67 % (ref 32–75)
NRBC # BLD: 0 K/UL (ref 0–0.01)
NRBC BLD-RTO: 0 PER 100 WBC
PLATELET # BLD AUTO: 228 K/UL (ref 150–400)
PMV BLD AUTO: 10.8 FL (ref 8.9–12.9)
POTASSIUM SERPL-SCNC: 3.7 MMOL/L (ref 3.5–5.1)
RBC # BLD AUTO: 3.49 M/UL (ref 3.8–5.2)
SODIUM SERPL-SCNC: 141 MMOL/L (ref 136–145)
WBC # BLD AUTO: 7.6 K/UL (ref 3.6–11)

## 2022-11-12 PROCEDURE — 74011000250 HC RX REV CODE- 250: Performed by: SURGERY

## 2022-11-12 PROCEDURE — 85025 COMPLETE CBC W/AUTO DIFF WBC: CPT

## 2022-11-12 PROCEDURE — 74011250637 HC RX REV CODE- 250/637: Performed by: STUDENT IN AN ORGANIZED HEALTH CARE EDUCATION/TRAINING PROGRAM

## 2022-11-12 PROCEDURE — 65270000029 HC RM PRIVATE

## 2022-11-12 PROCEDURE — 74011250636 HC RX REV CODE- 250/636: Performed by: SURGERY

## 2022-11-12 PROCEDURE — 36415 COLL VENOUS BLD VENIPUNCTURE: CPT

## 2022-11-12 PROCEDURE — 80048 BASIC METABOLIC PNL TOTAL CA: CPT

## 2022-11-12 PROCEDURE — 74011250637 HC RX REV CODE- 250/637: Performed by: SURGERY

## 2022-11-12 RX ORDER — CETIRIZINE HYDROCHLORIDE 10 MG/1
5 TABLET ORAL DAILY
Status: DISCONTINUED | OUTPATIENT
Start: 2022-11-12 | End: 2022-11-13 | Stop reason: HOSPADM

## 2022-11-12 RX ADMIN — ACETAMINOPHEN 650 MG: 325 TABLET ORAL at 14:51

## 2022-11-12 RX ADMIN — KETOROLAC TROMETHAMINE 15 MG: 30 INJECTION, SOLUTION INTRAMUSCULAR; INTRAVENOUS at 12:10

## 2022-11-12 RX ADMIN — LORAZEPAM 1 MG: 1 TABLET ORAL at 19:39

## 2022-11-12 RX ADMIN — ESCITALOPRAM OXALATE 10 MG: 10 TABLET ORAL at 17:22

## 2022-11-12 RX ADMIN — KETOROLAC TROMETHAMINE 15 MG: 30 INJECTION, SOLUTION INTRAMUSCULAR; INTRAVENOUS at 23:14

## 2022-11-12 RX ADMIN — ACETAMINOPHEN 650 MG: 325 TABLET ORAL at 09:14

## 2022-11-12 RX ADMIN — POTASSIUM CHLORIDE 20 MEQ: 750 TABLET, FILM COATED, EXTENDED RELEASE ORAL at 09:14

## 2022-11-12 RX ADMIN — SODIUM CHLORIDE, PRESERVATIVE FREE 10 ML: 5 INJECTION INTRAVENOUS at 06:21

## 2022-11-12 RX ADMIN — KETOROLAC TROMETHAMINE 15 MG: 30 INJECTION, SOLUTION INTRAMUSCULAR; INTRAVENOUS at 17:22

## 2022-11-12 RX ADMIN — ACETAMINOPHEN 650 MG: 325 TABLET ORAL at 19:39

## 2022-11-12 RX ADMIN — KETOROLAC TROMETHAMINE 15 MG: 30 INJECTION, SOLUTION INTRAMUSCULAR; INTRAVENOUS at 06:16

## 2022-11-12 RX ADMIN — ENOXAPARIN SODIUM 40 MG: 100 INJECTION SUBCUTANEOUS at 09:14

## 2022-11-12 RX ADMIN — CETIRIZINE HYDROCHLORIDE 5 MG: 10 TABLET, FILM COATED ORAL at 12:10

## 2022-11-12 RX ADMIN — SODIUM CHLORIDE, PRESERVATIVE FREE 10 ML: 5 INJECTION INTRAVENOUS at 22:21

## 2022-11-12 RX ADMIN — SODIUM CHLORIDE, PRESERVATIVE FREE 10 ML: 5 INJECTION INTRAVENOUS at 14:51

## 2022-11-12 RX ADMIN — PRAVASTATIN SODIUM 20 MG: 10 TABLET ORAL at 22:21

## 2022-11-12 RX ADMIN — DILTIAZEM HYDROCHLORIDE 300 MG: 300 CAPSULE, COATED, EXTENDED RELEASE ORAL at 17:22

## 2022-11-12 RX ADMIN — ACETAMINOPHEN 650 MG: 325 TABLET ORAL at 01:58

## 2022-11-12 RX ADMIN — KETOROLAC TROMETHAMINE 15 MG: 30 INJECTION, SOLUTION INTRAMUSCULAR; INTRAVENOUS at 00:28

## 2022-11-12 RX ADMIN — TRAMADOL HYDROCHLORIDE 50 MG: 50 TABLET, COATED ORAL at 20:42

## 2022-11-12 RX ADMIN — BUPROPION HYDROCHLORIDE 300 MG: 150 TABLET, EXTENDED RELEASE ORAL at 09:14

## 2022-11-12 NOTE — PROGRESS NOTES
0730-  Bedside and Verbal shift change report given to Krista Green RN (oncoming nurse) by Derian Mcdonnell RN (offgoing nurse). Report included the following information SBAR, Kardex, Intake/Output, MAR, and Recent Results. 1915-  Bedside and Verbal shift change report given to Derian Mcdonnell RN (oncoming nurse) by Krista Green RN (offgoing nurse). Report included the following information SBAR, Kardex, Intake/Output, MAR, and Recent Results.          Wound Prevention Checklist    Patient: Karen Andujar (74 y.o. female)  Date: 11/12/2022  [facility] [C18.9] <principal problem not specified>    Precautions:         []  Heel prevention boots placed on patient    []  Patient turned q2h during shift    []  Lift team ordered    []  Patient on [place] bed/Specialty bed    [x]  Each Wound is documented during shift (Stage, Color, drainage, odor, measurements, and dressings)    [x]  Dual skin check done with luzmaria Bae RN

## 2022-11-12 NOTE — PROGRESS NOTES
Problem: Pain  Goal: *Control of Pain  Outcome: Progressing Towards Goal  Goal: *PALLIATIVE CARE:  Alleviation of Pain  Outcome: Progressing Towards Goal  Goal: *Ability to perform ADLs and demonstrates progressive mobility and function  Outcome: Progressing Towards Goal     Problem: Falls - Risk of  Goal: *Absence of Falls  Description: Document Priscilla Fall Risk and appropriate interventions in the flowsheet.   Outcome: Progressing Towards Goal  Note: Fall Risk Interventions:  Mobility Interventions: Patient to call before getting OOB, Strengthening exercises (ROM-active/passive)         Medication Interventions: Teach patient to arise slowly    Elimination Interventions: Patient to call for help with toileting needs           Goal: *Ability to perform ADLs and demonstrates progressive mobility and function  Outcome: Progressing Towards Goal

## 2022-11-12 NOTE — PROGRESS NOTES
Problem: Pain  Goal: *Control of Pain  Outcome: Progressing Towards Goal  Goal: *PALLIATIVE CARE:  Alleviation of Pain  Outcome: Progressing Towards Goal  Goal: *Ability to perform ADLs and demonstrates progressive mobility and function  Outcome: Progressing Towards Goal     Problem: Patient Education: Go to Patient Education Activity  Goal: Patient/Family Education  Outcome: Progressing Towards Goal     Problem: Falls - Risk of  Goal: *Absence of Falls  Description: Document Olman Neville Fall Risk and appropriate interventions in the flowsheet.   Outcome: Progressing Towards Goal  Note: Fall Risk Interventions:  Mobility Interventions: Patient to call before getting OOB, Strengthening exercises (ROM-active/passive)         Medication Interventions: Patient to call before getting OOB, Teach patient to arise slowly    Elimination Interventions: Patient to call for help with toileting needs           Goal: *Ability to perform ADLs and demonstrates progressive mobility and function  Outcome: Progressing Towards Goal     Problem: Patient Education: Go to Patient Education Activity  Goal: Patient/Family Education  Outcome: Progressing Towards Goal     Problem: General Medical Care Plan  Goal: *Vital signs within specified parameters  Outcome: Progressing Towards Goal  Goal: *Absence of infection signs and symptoms  Outcome: Progressing Towards Goal  Goal: *Optimal pain control at patient's stated goal  Outcome: Progressing Towards Goal  Goal: *Skin integrity maintained  Outcome: Progressing Towards Goal  Goal: *Fluid volume balance  Outcome: Progressing Towards Goal  Goal: *Optimize nutritional status  Outcome: Progressing Towards Goal  Goal: *Anxiety reduced or absent  Outcome: Progressing Towards Goal  Goal: *Progressive mobility and function (eg: ADL's)  Outcome: Progressing Towards Goal     Problem: Patient Education: Go to Patient Education Activity  Goal: Patient/Family Education  Outcome: Progressing Towards Goal

## 2022-11-12 NOTE — PROGRESS NOTES
CRS Note    POD2 sigmoid colectomy    Pain controlled. No n/v, tolerating diet. Flatus and BM.     Vitals reviewed    NAD  Abd soft, minimally distended, appropriately tender, icisions c/d/I with dermabond in place    Labs reviewed    A/P:  POD2 sigmoid colectomy, recovering well  - dc IVF  - continue current pain control  - ambulate   - home tomorrow if continues to do well    Lori Centeno MD   [facility]  [phone]

## 2022-11-12 NOTE — PROGRESS NOTES
Karri of Shift Note    Bedside shift change report given to Semore Preston (oncoming nurse) by Bright Cook RN (offgoing nurse). Report included the following information SBAR, Kardex, OR Summary, Procedure Summary, Intake/Output, MAR, Accordion, and Recent Results    Shift worked:  7a-7p     Shift summary and any significant changes:     Pts blackwell d/c, voiding. Pt has been active today, up in chair and amb in camp and doing well. Benedryl for itching x1 after dilaudid. Call to Dr Benay Apley, dilaudid order chg.     Concerns for physician to address:  none     Zone phone for oncoming shift:   3300       Activity:  Activity Level: Up with Assistance  Number times ambulated in hallways past shift: 3  Number of times OOB to chair past shift: 2    Cardiac:   Cardiac Monitoring: No      Cardiac Rhythm: Sinus Rhythm    Access:  Current line(s): PIV     Genitourinary:   Urinary status: voiding    Respiratory:   O2 Device: None (Room air)  Chronic home O2 use?: NO  Incentive spirometer at bedside: YES       GI:  Last Bowel Movement Date: 11/10/22  Current diet:  ADULT DIET Regular; Low Fiber  ADULT ORAL NUTRITION SUPPLEMENT Breakfast, Dinner; Other Supplement; ERAS 5 days supplement supply dropped off by RD  Passing flatus: YES  Tolerating current diet: YES       Pain Management:   Patient states pain is manageable on current regimen: YES    Skin:  Juan Score: 20  Interventions: increase time out of bed    Patient Safety:  Fall Score:  Total Score: 1  Interventions: gripper socks       Length of Stay:  Expected LOS: 3d 7h  Actual LOS: 1      Bright Cook, RN

## 2022-11-13 VITALS
RESPIRATION RATE: 16 BRPM | HEART RATE: 69 BPM | SYSTOLIC BLOOD PRESSURE: 146 MMHG | OXYGEN SATURATION: 97 % | DIASTOLIC BLOOD PRESSURE: 85 MMHG | WEIGHT: 148.37 LBS | HEIGHT: 66 IN | BODY MASS INDEX: 23.84 KG/M2 | TEMPERATURE: 98.3 F

## 2022-11-13 LAB
ANION GAP SERPL CALC-SCNC: 5 MMOL/L (ref 5–15)
BASOPHILS # BLD: 0 K/UL (ref 0–0.1)
BASOPHILS NFR BLD: 1 % (ref 0–1)
BUN SERPL-MCNC: 8 MG/DL (ref 6–20)
BUN/CREAT SERPL: 9 (ref 12–20)
CALCIUM SERPL-MCNC: 9 MG/DL (ref 8.5–10.1)
CHLORIDE SERPL-SCNC: 104 MMOL/L (ref 97–108)
CO2 SERPL-SCNC: 32 MMOL/L (ref 21–32)
CREAT SERPL-MCNC: 0.85 MG/DL (ref 0.55–1.02)
DIFFERENTIAL METHOD BLD: ABNORMAL
EOSINOPHIL # BLD: 0.2 K/UL (ref 0–0.4)
EOSINOPHIL NFR BLD: 3 % (ref 0–7)
ERYTHROCYTE [DISTWIDTH] IN BLOOD BY AUTOMATED COUNT: 12.8 % (ref 11.5–14.5)
GLUCOSE SERPL-MCNC: 92 MG/DL (ref 65–100)
HCT VFR BLD AUTO: 37.2 % (ref 35–47)
HGB BLD-MCNC: 12.1 G/DL (ref 11.5–16)
IMM GRANULOCYTES # BLD AUTO: 0 K/UL (ref 0–0.04)
IMM GRANULOCYTES NFR BLD AUTO: 1 % (ref 0–0.5)
LYMPHOCYTES # BLD: 1.7 K/UL (ref 0.8–3.5)
LYMPHOCYTES NFR BLD: 26 % (ref 12–49)
MCH RBC QN AUTO: 32.4 PG (ref 26–34)
MCHC RBC AUTO-ENTMCNC: 32.5 G/DL (ref 30–36.5)
MCV RBC AUTO: 99.5 FL (ref 80–99)
MONOCYTES # BLD: 0.4 K/UL (ref 0–1)
MONOCYTES NFR BLD: 6 % (ref 5–13)
NEUTS SEG # BLD: 4.2 K/UL (ref 1.8–8)
NEUTS SEG NFR BLD: 63 % (ref 32–75)
NRBC # BLD: 0 K/UL (ref 0–0.01)
NRBC BLD-RTO: 0 PER 100 WBC
PLATELET # BLD AUTO: 249 K/UL (ref 150–400)
PMV BLD AUTO: 10.3 FL (ref 8.9–12.9)
POTASSIUM SERPL-SCNC: 3.8 MMOL/L (ref 3.5–5.1)
RBC # BLD AUTO: 3.74 M/UL (ref 3.8–5.2)
SODIUM SERPL-SCNC: 141 MMOL/L (ref 136–145)
WBC # BLD AUTO: 6.6 K/UL (ref 3.6–11)

## 2022-11-13 PROCEDURE — 74011250637 HC RX REV CODE- 250/637: Performed by: STUDENT IN AN ORGANIZED HEALTH CARE EDUCATION/TRAINING PROGRAM

## 2022-11-13 PROCEDURE — 74011000250 HC RX REV CODE- 250: Performed by: SURGERY

## 2022-11-13 PROCEDURE — 74011250637 HC RX REV CODE- 250/637: Performed by: SURGERY

## 2022-11-13 PROCEDURE — 74011250636 HC RX REV CODE- 250/636: Performed by: SURGERY

## 2022-11-13 PROCEDURE — 85025 COMPLETE CBC W/AUTO DIFF WBC: CPT

## 2022-11-13 PROCEDURE — 36415 COLL VENOUS BLD VENIPUNCTURE: CPT

## 2022-11-13 PROCEDURE — 80048 BASIC METABOLIC PNL TOTAL CA: CPT

## 2022-11-13 RX ADMIN — POTASSIUM CHLORIDE 20 MEQ: 750 TABLET, FILM COATED, EXTENDED RELEASE ORAL at 09:29

## 2022-11-13 RX ADMIN — BUPROPION HYDROCHLORIDE 300 MG: 150 TABLET, EXTENDED RELEASE ORAL at 09:29

## 2022-11-13 RX ADMIN — ACETAMINOPHEN 650 MG: 325 TABLET ORAL at 09:29

## 2022-11-13 RX ADMIN — CETIRIZINE HYDROCHLORIDE 5 MG: 10 TABLET, FILM COATED ORAL at 09:29

## 2022-11-13 RX ADMIN — ACETAMINOPHEN 650 MG: 325 TABLET ORAL at 05:04

## 2022-11-13 RX ADMIN — SODIUM CHLORIDE, PRESERVATIVE FREE 10 ML: 5 INJECTION INTRAVENOUS at 05:48

## 2022-11-13 RX ADMIN — KETOROLAC TROMETHAMINE 15 MG: 30 INJECTION, SOLUTION INTRAMUSCULAR; INTRAVENOUS at 05:04

## 2022-11-13 NOTE — PROGRESS NOTES
Transition of Care Plan:    RUR: 9%  Disposition: Home with Spouse  Follow up appointments: PCP  DME needed: None  Transportation at Discharge: Spouse  Keys or means to access home: Yes    Medicare Letter: N/a  Is patient a [place] and connected with the [facility]? N/a              If yes, was [employer] transfer form completed and [facility] notified? N/a  Caregiver Contact: Cruz Arredondo., spouse ([phone])  Discharge Caregiver contacted prior to discharge? Pt declined  Care Conference needed?: No                  Reason for Admission:  Colon Cancer                   RUR Score:  9%                   Plan for utilizing home health:  N/a        PCP: First and Last name:  Chandni Cortes MD     Name of Practice:    Are you a current patient: Yes/No: Yes   Approximate date of last visit: NP 6 days ago   Can you participate in a virtual visit with your PCP: Yes                    Current Advanced Directive/Advance Care Plan: Full Code  [address] (ACP) Conversation    Date of Conversation: 11/13/22  Conducted with: Patient with Decision Making Capacity    Healthcare Decision Maker:     Primary Decision Maker: Maddie Guru. - Spouse - [phone]    Secondary Decision Maker: Maine Worrell - Parent - [phone]  Click here to complete [address] including selection of the Healthcare Decision Maker Relationship (ie \"Primary\")    Today we documented Decision Maker(s) consistent with Legal Next of Kin hierarchy. Content/Action Overview:   DECLINED ACP conversation - will revisit periodically   Reviewed DNR/DNI and patient elects Full Code (Attempt Resuscitation)  Length of Voluntary ACP Conversation in minutes:  <16 minutes (Non-Billable)  Soniya Byers             Transition of Care Plan:                 - Home with Spouse  - Follow-up appointments  - Spouse to transport home    50 YO White Female admitted on 11/10/22 for Colon Cancer.  L[address] with spouse in [place], [place]. Bed/bath on main level of house. Works full-time. Independent with mobility, ADLs/IADLs to include driving. Denies hx of HH, SNF, IPR, or DME. Preferred Rx is [facility] ([address]). Has [employer]. Discharge orders placed. Demographic info verified, assessment completed. No PT/OT consults or needs for d/c. Pt to call and schedule PCP follow-up as offices are closed at this time. Spouse to transport home. All information entered into pt AVS.     Pt has no additional CM needs at this time       Care Management Interventions  PCP Verified by CM: Yes  Palliative Care Criteria Met (RRAT>21 & CHF Dx)?: No  Mode of Transport at Discharge: Other (see comment) (spouse)  Transition of Care Consult (CM Consult): Discharge Planning  Discharge Durable Medical Equipment: No  Health Maintenance Reviewed: Yes  Physical Therapy Consult: No  Occupational Therapy Consult: No  Speech Therapy Consult: No  Support Systems: Spouse/Significant Other, Parent(s), Other Family Member(s)  Confirm Follow Up Transport: Self  The Plan for Transition of Care is Related to the Following Treatment Goals :  Follow-up Appnt  The Patient and/or Patient Representative was Provided with a Choice of Provider and Agrees with the Discharge Plan?: Yes  Name of the Patient Representative Who was Provided with a Choice of Provider and Agrees with the Discharge Plan: Renée Ruffin (pt)  Discharge Location  Patient Expects to be Discharged to[de-identified] Home    Mar Bryan 29 Manager  [phone]

## 2022-11-13 NOTE — DISCHARGE SUMMARY
Admit date: 11/10/2022   Admitting Provider: Isrrael Kim MD    Discharge date: 11/13/2022  Discharging Provider: Vivek Shahid MD      * Admission Diagnoses: Colon cancer [facility]) [C18.9]    * Discharge Diagnoses:    Hospital Problems as of 11/13/2022 Date Reviewed: 10/19/2022            Codes Class Noted - Resolved POA    Colon cancer [facility]) ICD-10-CM: C18.9  ICD-9-CM: 153.9  11/10/2022 - Present Unknown           * Hospital Course: Underwent robotic sigmoid resection for colon cancer on 11/10. Postoperatively, recovered as expected with return of bowel function and control of pain on oral pain medication. Deemed appropriate for discharge home on 11/13. * Procedures:   Procedure(s):  ROBOTIC ASSISTED LAPAROSCOPIC SIGMOID COLECTOMY WITH INTRAOPERATIVE FLEXIBLE SIGMOIDOSCOPY ( E R A S)      Consults: None      Discharge Exam:  NAD, AAOx3  Abd soft, approp TTP  Incisions c/d/I with dermabond    * Discharge Condition: good  * Disposition: Home    Discharge Medications:  Current Discharge Medication List        START taking these medications    Details   oxyCODONE IR (ROXICODONE) 5 mg immediate release tablet Take 1 Tablet by mouth every four (4) hours as needed (Pain Moderate (4-6)) for up to 3 days. Max Daily Amount: 30 mg.  Qty: 15 Tablet, Refills: 0  Start date: 11/11/2022, End date: 11/14/2022    Associated Diagnoses: Pain           CONTINUE these medications which have NOT CHANGED    Details   POTASSIUM CHLORIDE PO Take 20 mEq by mouth daily. acetaminophen (TYLENOL) 500 mg tablet Take 1,000 mg by mouth every six (6) hours as needed for Pain.      pravastatin (PRAVACHOL) 20 mg tablet Take 20 mg by mouth nightly. LORazepam (ATIVAN) 1 mg tablet Take 1 mg by mouth every four (4) hours as needed for Anxiety. dilTIAZem ER (CARDIZEM CD) 300 mg capsule Take 1 Capsule by mouth daily.   Qty: 90 Capsule, Refills: 3    Associated Diagnoses: Paroxysmal SVT (supraventricular tachycardia) (HCC) cyclobenzaprine (FLEXERIL) 10 mg tablet TAKE 1 TABLET BY MOUTH EVERY NIGHT AS NEEDED      ibuprofen (MOTRIN) 800 mg tablet Take 1 Tab by mouth every eight (8) hours as needed for Pain. Qty: 30 Tab, Refills: 1      diphenhydrAMINE (BENADRYL) 25 mg capsule Take 25 mg by mouth as needed. Alternating with Claritan.      loratadine (CLARITIN) 10 mg tablet Take 10 mg by mouth daily as needed. buPROPion XL (WELLBUTRIN XL) 300 mg XL tablet Take 300 mg by mouth daily. escitalopram oxalate (LEXAPRO) 5 mg tablet Take 1 by mouth daily. MUST MAKE APPOINTMENT FOR FURTHER REFILLS OR GET FURTHER FROM PCP  Qty: 30 Tab, Refills: 0    Associated Diagnoses: Post concussion syndrome; Post-concussion headache           STOP taking these medications       metoclopramide HCl (REGLAN) 10 mg tablet Comments:   Reason for Stopping:         metroNIDAZOLE (FLAGYL) 500 mg tablet Comments:   Reason for Stopping:         neomycin (MYCIFRADIN) 500 mg tablet Comments:   Reason for Stopping:         PEG 3350-Electrolytes (GO-LYTELY) 236-22.74-6.74 -5.86 gram suspension Comments:   Reason for Stopping:               * Follow-up Care/Patient Instructions:   Activity: No lifting, Driving, or Strenuous exercise for 6weeks  Diet: low fiber diet for 2 weeks  Wound Care: None needed    Follow-up Information       Follow up With Specialties Details Why Contact Info    Jaz Ruiz MD [address]  [phone]          Follow up appointment scheduled with Dr. Shandra Vera    Signed:  Yamel Jeff MD  11/13/2022  8:33 AM

## 2022-12-09 ENCOUNTER — TRANSCRIBE ORDER (OUTPATIENT)
Dept: SCHEDULING | Age: 47
End: 2022-12-09

## 2022-12-09 DIAGNOSIS — C18.7 MALIGNANT NEOPLASM OF SIGMOID COLON (HCC): Primary | ICD-10-CM

## 2022-12-28 ENCOUNTER — HOSPITAL ENCOUNTER (OUTPATIENT)
Dept: INTERVENTIONAL RADIOLOGY/VASCULAR | Age: 47
Discharge: HOME | End: 2022-12-28
Attending: INTERNAL MEDICINE

## 2022-12-28 NOTE — PROGRESS NOTES
Called Dr. Landen Lopes office and spoke with Ti Fry to inform pt. Was scheduled for her port placement today and she hasn't shown up. Informed Ti Fry all phone numbers we attempted in pt.'s med. Rec was unsuccessful. Informed if needed to reschedule, they can call [phone] or the scheduling [phone].

## 2022-12-30 ENCOUNTER — HOSPITAL ENCOUNTER (OUTPATIENT)
Dept: INTERVENTIONAL RADIOLOGY/VASCULAR | Age: 47
Discharge: HOME | End: 2022-12-30
Attending: INTERNAL MEDICINE | Admitting: STUDENT IN AN ORGANIZED HEALTH CARE EDUCATION/TRAINING PROGRAM
Payer: COMMERCIAL

## 2022-12-30 VITALS
BODY MASS INDEX: 23.63 KG/M2 | OXYGEN SATURATION: 98 % | HEIGHT: 66 IN | HEART RATE: 63 BPM | DIASTOLIC BLOOD PRESSURE: 87 MMHG | WEIGHT: 147 LBS | TEMPERATURE: 97.9 F | SYSTOLIC BLOOD PRESSURE: 124 MMHG | RESPIRATION RATE: 20 BRPM

## 2022-12-30 DIAGNOSIS — C18.7 MALIGNANT NEOPLASM OF SIGMOID COLON (HCC): ICD-10-CM

## 2022-12-30 PROCEDURE — C1894 INTRO/SHEATH, NON-LASER: HCPCS

## 2022-12-30 PROCEDURE — 77030031139 HC SUT VCRL2 J&J -A

## 2022-12-30 PROCEDURE — 74011000250 HC RX REV CODE- 250: Performed by: STUDENT IN AN ORGANIZED HEALTH CARE EDUCATION/TRAINING PROGRAM

## 2022-12-30 PROCEDURE — 2709999900 HC NON-CHARGEABLE SUPPLY

## 2022-12-30 PROCEDURE — 77030011229 HC DIL VESL COON COOK -A

## 2022-12-30 PROCEDURE — C1788 PORT, INDWELLING, IMP: HCPCS

## 2022-12-30 PROCEDURE — 77030010507 HC ADH SKN DERMBND J&J -B

## 2022-12-30 PROCEDURE — 76942 ECHO GUIDE FOR BIOPSY: CPT

## 2022-12-30 PROCEDURE — 74011250636 HC RX REV CODE- 250/636: Performed by: STUDENT IN AN ORGANIZED HEALTH CARE EDUCATION/TRAINING PROGRAM

## 2022-12-30 PROCEDURE — 77030011893 HC TY CUT DN TRIS -B

## 2022-12-30 RX ORDER — LIDOCAINE HYDROCHLORIDE 10 MG/ML
20 INJECTION INFILTRATION; PERINEURAL
Status: COMPLETED | OUTPATIENT
Start: 2022-12-30 | End: 2022-12-30

## 2022-12-30 RX ORDER — HEPARIN 100 UNIT/ML
300 SYRINGE INTRAVENOUS AS NEEDED
Status: DISCONTINUED | OUTPATIENT
Start: 2022-12-30 | End: 2022-12-30

## 2022-12-30 RX ORDER — FLUMAZENIL 0.1 MG/ML
0.5 INJECTION INTRAVENOUS
Status: DISCONTINUED | OUTPATIENT
Start: 2022-12-30 | End: 2022-12-30

## 2022-12-30 RX ORDER — NALOXONE HYDROCHLORIDE 0.4 MG/ML
1 INJECTION, SOLUTION INTRAMUSCULAR; INTRAVENOUS; SUBCUTANEOUS ONCE
Status: DISCONTINUED | OUTPATIENT
Start: 2022-12-30 | End: 2022-12-30

## 2022-12-30 RX ORDER — FENTANYL CITRATE 50 UG/ML
25-200 INJECTION, SOLUTION INTRAMUSCULAR; INTRAVENOUS
Status: DISCONTINUED | OUTPATIENT
Start: 2022-12-30 | End: 2022-12-30

## 2022-12-30 RX ORDER — MIDAZOLAM HYDROCHLORIDE 1 MG/ML
.5-5 INJECTION, SOLUTION INTRAMUSCULAR; INTRAVENOUS
Status: DISCONTINUED | OUTPATIENT
Start: 2022-12-30 | End: 2022-12-30

## 2022-12-30 RX ORDER — SODIUM CHLORIDE 9 MG/ML
25 INJECTION, SOLUTION INTRAVENOUS
Status: DISCONTINUED | OUTPATIENT
Start: 2022-12-30 | End: 2022-12-30

## 2022-12-30 RX ORDER — LIDOCAINE HYDROCHLORIDE AND EPINEPHRINE 10; 10 MG/ML; UG/ML
20 INJECTION, SOLUTION INFILTRATION; PERINEURAL
Status: COMPLETED | OUTPATIENT
Start: 2022-12-30 | End: 2022-12-30

## 2022-12-30 RX ADMIN — LIDOCAINE HYDROCHLORIDE AND EPINEPHRINE 9 ML: 10; 10 INJECTION, SOLUTION INFILTRATION; PERINEURAL at 09:49

## 2022-12-30 RX ADMIN — MIDAZOLAM 1 MG: 1 INJECTION INTRAMUSCULAR; INTRAVENOUS at 09:27

## 2022-12-30 RX ADMIN — MIDAZOLAM 1 MG: 1 INJECTION INTRAMUSCULAR; INTRAVENOUS at 09:32

## 2022-12-30 RX ADMIN — SODIUM CHLORIDE 25 ML/HR: 9 INJECTION, SOLUTION INTRAVENOUS at 09:00

## 2022-12-30 RX ADMIN — LIDOCAINE HYDROCHLORIDE 9 ML: 10 INJECTION, SOLUTION INFILTRATION; PERINEURAL at 09:48

## 2022-12-30 RX ADMIN — CEFAZOLIN 2 G: 1 INJECTION, POWDER, FOR SOLUTION INTRAMUSCULAR; INTRAVENOUS at 08:45

## 2022-12-30 RX ADMIN — MIDAZOLAM 1 MG: 1 INJECTION INTRAMUSCULAR; INTRAVENOUS at 09:30

## 2022-12-30 RX ADMIN — Medication 350 UNITS: at 09:48

## 2022-12-30 RX ADMIN — MIDAZOLAM 1 MG: 1 INJECTION INTRAMUSCULAR; INTRAVENOUS at 09:23

## 2022-12-30 NOTE — H&P
Interventional and Vascular Radiology History and Physical    Patient: Maxx Rosen 49 y.o. female       Chief Complaint: IV access needed for chemotherapy     History of Present Illness: 49year old female presents for port for chemotherapy.      History:    Past Medical History:   Diagnosis Date    Colon cancer ([facility] [address] 10/2022    Ectopic pregnancy 1998    Endometriosis     Fibromyalgia     Hypercholesteremia     PONV (postoperative nausea and vomiting)     PSVT (paroxysmal supraventricular tachycardia) (HCC)     Psychiatric disorder     anxiety and depression     Family History   Problem Relation Age of Onset    Stroke Mother     Cancer Mother 66        lymphoma    Neuropathy Mother     Diabetes Mother     Lung Disease Father     Cancer Father         prostate    Heart Disease Father     Stroke Father     High Cholesterol Father     Cancer Maternal Grandmother         pancreatic    Heart Disease Paternal Grandmother     Stroke Paternal Grandmother     Anxiety Child     Attention Deficit Hyperactivity Disorder Child     Other Child         autism and dyslexia     Social History     Socioeconomic History    Marital status:      Spouse name: Not on file    Number of children: Not on file    Years of education: Not on file    Highest education level: Not on file   Occupational History    Not on file   Tobacco Use    Smoking status: Never    Smokeless tobacco: Never   Vaping Use    Vaping Use: Former    Start date: 3/1/2022    Quit date: 9/1/2022    Substances: THC    Devices: Pre-filled or refillable cartridge, Pre-filled pod   Substance and Sexual Activity    Alcohol use: Yes     Comment: socially    Drug use: Yes     Types: Marijuana     Comment: edibles    Sexual activity: Yes     Partners: Male   Other Topics Concern    Not on file   Social History Narrative    ** Merged History Encounter **          Social Determinants of Health     Financial Resource Strain: Not on file   Food Insecurity: Not on file Transportation Needs: Not on file   Physical Activity: Not on file   Stress: Not on file   Social Connections: Not on file   Intimate Partner Violence: Not on file   Housing Stability: Not on file       Allergies: Allergies   Allergen Reactions    Codeine Itching    Codeine Itching    Lipitor [Atorvastatin] Hives       Current Medications:  Current Facility-Administered Medications   Medication Dose Route Frequency    fentaNYL citrate (PF) injection  mcg   mcg IntraVENous RAD PRN    midazolam (VERSED) injection 0.5-5 mg  0.5-5 mg IntraVENous Rad Multiple    flumazeniL (ROMAZICON) 0.1 mg/mL injection 0.5 mg  0.5 mg IntraVENous RAD PRN    naloxone (NARCAN) injection 1 mg  1 mg IntraVENous ONCE    0.9% sodium chloride infusion  25 mL/hr IntraVENous RAD CONTINUOUS    lidocaine (XYLOCAINE) 10 mg/mL (1 %) injection 20 mL  20 mL SubCUTAneous RAD ONCE    heparin (porcine) pf 300 Units  300 Units InterCATHeter PRN    lidocaine-EPINEPHrine (XYLOCAINE) 1 %-1:100,000 injection 200 mg  20 mL SubCUTAneous RAD ONCE        Physical Exam:  Blood pressure (!) 145/105, pulse 63, temperature 97.9 °F (36.6 °C), resp. rate 18, height 5' 6\" (1.676 m), weight 66.7 kg (147 lb), last menstrual period 02/27/2018, SpO2 98 %, not currently breastfeeding. No acute distress  Good peripheral perfusion  Nonlabored respirations  Abdomen nondistended      Alerts:    Hospital Problems  Date Reviewed: 10/19/2022   None      Laboratory:    No results for input(s): HGB, HCT, WBC, PLT, INR, BUN, CREA, K, CRCLT, HGBEXT, HCTEXT, PLTEXT, INREXT in the last 72 hours. No lab exists for component: PTT, PT      Plan of Care/Planned Procedure:  Risks, benefits, and alternatives reviewed with patient and she agrees to proceed with the procedure. Conscious sedation will be performed with IV fentanyl and versed.        Bright Willson MD

## 2022-12-30 NOTE — DISCHARGE INSTRUCTIONS
Implanted Port: What to Expect at [facility] [address] [address] had a procedure to implant a port. A port is a device placed, in most cases, under the skin of your chest below your collarbone. It is made of plastic, stainless steel, or titanium. It's about the size of a quarter, but thicker. It looks like a small bump under your skin. A thin, flexible tube called a catheter runs under the skin from the port into a large vein. With the port, you will be able to get medicines (such as chemotherapy) with more comfort. You also can get blood, nutrients, or other fluids. Blood can be taken through the port for tests. You will probably have some discomfort and bruising at the port site. This will go away in a few days. The port can be used right away. You may have the port for weeks, months, or longer. Your port will need to be flushed out regularly to keep it open. A nurse or other health professional will do this for you. This care sheet gives you a general idea about how long it will take for you to recover. But each person recovers at a different pace. Follow the steps below to feel better as quickly as possible. How can you care for yourself at home? Activity    Avoid arm and upper body movements that may pull on the catheter for the first few days. These movements include heavy weight lifting and vigorous use of your arms. You will probably need to take 1 day off from work and will be able to return to normal activities shortly after. This depends on the type of work you do, why you have the catheter, and how you feel. Wait 24 hours to shower. Do not take a bath or go swimming for at least a week, or until the site has completely healed. You can drive in 24 hours. Be careful when you pull your seat belt across your chest so it doesn't pull out the catheter. It's okay if the seat belt lays over the catheter. Medicines    Your doctor will tell you if and when you can restart your medicines.  He or she will also give you instructions about taking any new medicines. If you stopped taking aspirin or some other blood thinner, your doctor will tell you when to start taking it again. Be safe with medicines. Read and follow all instructions on the label. If the doctor gave you a prescription medicine for pain, take it as prescribed. If you are not taking a prescription pain medicine, ask your doctor if you can take an over-the-counter medicine. Incision care     Wash the area with soap and water and pat it dry. Don't use hydrogen peroxide or alcohol, which can slow healing. You may cover the area with a gauze bandage if it weeps or rubs against clothing. Change the bandage every day. You have surgical glue on the sites. Allow this glue to fall off on its own. Do not pick or rub it off. Other instructions    Always carry the medical alert card that your doctor gives you. It contains information about your port. It will tell health care workers that you have a port in case you need emergency care. When you get dressed, be careful not to rub the port. Do not wear a bra or suspenders that irritate your skin near the port. Follow-up care is a key part of your treatment and safety. Be sure to make and go to all appointments, and call your doctor if you are having problems. It's also a good idea to know your test results and keep a list of the medicines you take. When should you call for help? Call your doctor now or seek immediate medical care if:    You have signs of infection, such as: Increased pain, swelling, warmth, or redness. Red streaks leading from the area. Pus draining from the area. A fever. You have pain or swelling in your neck or arm. You have trouble breathing. Watch closely for changes in your health, and be sure to contact your doctor if:    You have any problems with your line or port. Where can you learn more?   Go to http://www.gray.com/  Enter M256 in the search box to learn more about \"Implanted Port: What to Expect at Home. \"  Current as of: January 20, 2022               Content Version: 13.4  © 2006-2022 Healthwise, [facility]. Care instructions adapted under license by [employer] (which disclaims liability or warranty for this information). If you have questions about a medical condition or this instruction, always ask your healthcare professional. [address] any warranty or liability for your use of this information. You have received sedation medications today. YOU SHOULD NOT DRIVE FOR 24 HOURS, DO NOT OPERATE HEAVY MACHINERY, DO NOT MAKE ANY LEGAL DECISIONS OR SIGN LEGAL DOCUMENTS FOR 24 HOURS. DO NOT DRINK ALCOHOL, TAKE ANY MEDICATIONS UNLESS PRESCRIBED BY YOUR DOCTOR. IF YOU ARE A CAREGIVER, SOMEONE SHOULD TAKE THAT ROLE FOR 24 HOURS. Side effects of sedation medications and other medications used today have been reviewed  Those side effects can include but are not limited to: dizziness, drowsiness, poor balance, fatigue, sleepiness. Take precautions at home to prevent falls, such as assistance with walking or stairs if allowed and /or when needed or position changes. Allergic or adverse reactions could include nausea, itching, hives, dizziness, or anything else out of the ordinary. Should you experience any of these significant changes, please call [phone] between the hours of 7:30 am and 3:30 pm or [phone] after hours. After hours, ask the  to page the X-ray Technologist, and describe the problem to the technologist. If you are experiencing chest pain, shortness of breath, altered mental state, unusual bleeding or any other emergent symptom you should call 911 immediately.

## 2022-12-30 NOTE — ROUTINE PROCESS
0820: Pt arrives ambulatory to angio department accompanied by mother in law for placement of tunneled central venous access device (PORT placement) procedure. All assessments completed and consent was reviewed. Education given was regarding procedure, moderate sedation, post-procedure care and  management/follow-up. Opportunity for questions was provided and all questions and concerns were addressed. 1035: Name of procedure: PORT placement    Sedation medication given: 4 mg versed    Sedation tolerated: well    Total sedation time: 23 minutes    Vital Signs: stable    Any complications related to procedure: see orders    Post procedure Care Needed/order sets placed in connect care: see orders    Pt tolerated procedure well. VSS. No C/O pain. Dressing site CDI. No bleeding or hematoma noted on site. IV D/Cd. Discharge instructions given. Copy on chart and copy given to patient. Pt verbalized understanding. Pt taken to car via wheelchair. NAD noted at time of discharge.

## 2023-03-28 ENCOUNTER — TRANSCRIBE ORDER (OUTPATIENT)
Dept: SCHEDULING | Age: 48
End: 2023-03-28

## 2023-03-28 DIAGNOSIS — Z51.11 ENCOUNTER FOR ANTINEOPLASTIC CHEMOTHERAPY: Primary | ICD-10-CM

## 2023-04-21 ENCOUNTER — HOSPITAL ENCOUNTER (OUTPATIENT)
Dept: CT IMAGING | Age: 48
Discharge: HOME | End: 2023-04-21
Attending: INTERNAL MEDICINE
Payer: COMMERCIAL

## 2023-04-21 DIAGNOSIS — Z51.11 ENCOUNTER FOR ANTINEOPLASTIC CHEMOTHERAPY: ICD-10-CM

## 2023-04-21 PROCEDURE — 74011000250 HC RX REV CODE- 250: Performed by: INTERNAL MEDICINE

## 2023-04-21 PROCEDURE — 74011000636 HC RX REV CODE- 636: Performed by: INTERNAL MEDICINE

## 2023-04-21 PROCEDURE — 74177 CT ABD & PELVIS W/CONTRAST: CPT

## 2023-04-21 RX ORDER — BARIUM SULFATE 20 MG/ML
450 SUSPENSION ORAL ONCE
Status: COMPLETED | OUTPATIENT
Start: 2023-04-21 | End: 2023-04-21

## 2023-04-21 RX ADMIN — BARIUM SULFATE 450 ML: 20 SUSPENSION ORAL at 10:10

## 2023-04-21 RX ADMIN — IOPAMIDOL 100 ML: 755 INJECTION, SOLUTION INTRAVENOUS at 10:10

## 2023-06-30 NOTE — PROGRESS NOTES
Attending Attestation (For Attendings USE Only)... Event monitor shows PVC's.  F/u after stress test. thx.

## 2023-08-08 ENCOUNTER — OFFICE VISIT (OUTPATIENT)
Age: 48
End: 2023-08-08
Payer: COMMERCIAL

## 2023-08-08 VITALS
BODY MASS INDEX: 26.03 KG/M2 | DIASTOLIC BLOOD PRESSURE: 64 MMHG | TEMPERATURE: 97.1 F | SYSTOLIC BLOOD PRESSURE: 96 MMHG | HEART RATE: 66 BPM | RESPIRATION RATE: 16 BRPM | OXYGEN SATURATION: 97 % | WEIGHT: 162 LBS | HEIGHT: 66 IN

## 2023-08-08 DIAGNOSIS — K80.20 CALCULUS OF GALLBLADDER WITHOUT CHOLECYSTITIS WITHOUT OBSTRUCTION: Primary | ICD-10-CM

## 2023-08-08 PROCEDURE — 99204 OFFICE O/P NEW MOD 45 MIN: CPT | Performed by: SURGERY

## 2023-08-08 RX ORDER — GABAPENTIN 100 MG/1
CAPSULE ORAL
COMMUNITY
Start: 2023-08-04

## 2023-08-08 RX ORDER — MULTIVITAMIN WITH IRON
100 TABLET ORAL DAILY
COMMUNITY

## 2023-08-08 RX ORDER — IBUPROFEN 600 MG/1
600 TABLET ORAL EVERY 6 HOURS PRN
Status: ON HOLD | COMMUNITY
End: 2023-08-10 | Stop reason: SDUPTHER

## 2023-08-08 RX ORDER — BUPROPION HYDROCHLORIDE 300 MG/1
300 TABLET ORAL DAILY
COMMUNITY

## 2023-08-08 RX ORDER — LORATADINE 10 MG/1
10 TABLET ORAL DAILY PRN
COMMUNITY

## 2023-08-08 RX ORDER — ONDANSETRON HYDROCHLORIDE 8 MG/1
TABLET, FILM COATED ORAL
COMMUNITY
Start: 2023-06-30

## 2023-08-08 RX ORDER — ESCITALOPRAM OXALATE 10 MG/1
TABLET ORAL
COMMUNITY
Start: 2023-05-03

## 2023-08-08 RX ORDER — DILTIAZEM HYDROCHLORIDE 120 MG/1
CAPSULE, COATED, EXTENDED RELEASE ORAL DAILY
COMMUNITY
Start: 2023-05-03

## 2023-08-08 RX ORDER — CALCIUM CARBONATE 500 MG/1
1 TABLET, CHEWABLE ORAL DAILY
COMMUNITY

## 2023-08-08 ASSESSMENT — PATIENT HEALTH QUESTIONNAIRE - PHQ9
2. FEELING DOWN, DEPRESSED OR HOPELESS: 0
SUM OF ALL RESPONSES TO PHQ QUESTIONS 1-9: 0
SUM OF ALL RESPONSES TO PHQ QUESTIONS 1-9: 0
1. LITTLE INTEREST OR PLEASURE IN DOING THINGS: 0
SUM OF ALL RESPONSES TO PHQ9 QUESTIONS 1 & 2: 0
SUM OF ALL RESPONSES TO PHQ QUESTIONS 1-9: 0
SUM OF ALL RESPONSES TO PHQ QUESTIONS 1-9: 0

## 2023-08-08 NOTE — H&P (VIEW-ONLY)
Subjective:       Richie Shelby is a 49 y.o. female who presents for evaluation of cholelithiasis. She had a robotic sigmoid resection for colon cancer on 11/10/22 Dr. Virgen Clay. She just finished chemotherapy - 3 weeks ago. When she went to her CT scan, her gallbladder was angry and had stones. She also has kidney stones and follows up with a urologist. She can eat but within a few minutes she had epigastric discomfort. 23: CTCAP:  1. No evidence for metastatic disease to the chest  2. The small right hepatic lesion has decreased in size and changed in  appearance. 3. Status post surgery at the rectosigmoid junction. No evidence for recurrent  mass  4. Bilateral renal calculi are nonobstructing  5. Gallstone    23: CTAP:  Gallstones and mild gallbladder wall thickening.     Past Medical History:   Diagnosis Date    Colon cancer ([address]) 10/2022    Ectopic pregnancy     Endometriosis     Fibromyalgia     Hypercholesteremia     PONV (postoperative nausea and vomiting)     PSVT (paroxysmal supraventricular tachycardia) (HCC)     Psychiatric disorder     anxiety and depression     Past Surgical History:   Procedure Laterality Date     [facility] N/A 10/19/2022    FLEXIBLE SIGMOIDOSCOPY performed by Arleth Ramirez MD at [address]     x1    GYN      right salpingectomy    HEENT      tubes left ear    IR PORT PLACEMENT EQUAL OR GREATER THAN 5 YEARS  2022    IR PORT PLACEMENT EQUAL OR GREATER THAN 5 YEARS 2022 [facility] RAD ANGIO IR    IR PORT PLACEMENT EQUAL OR GREATER THAN 5 YEARS  2022    ORTHOPEDIC SURGERY      right mensicus and ACL    PARTIAL HYSTERECTOMY (CERVIX NOT REMOVED)  2016     Social History     Socioeconomic History    Marital status:    Tobacco Use    Smoking status: Never    Smokeless tobacco: Never   Substance and Sexual Activity    Alcohol use: Yes    Drug use: Yes     Types: Marijuana

## 2023-08-08 NOTE — PROGRESS NOTES
Subjective:       Lolly Ventura is a 49 y.o. female who presents for evaluation of cholelithiasis. She had a robotic sigmoid resection for colon cancer on 11/10/22 Dr. Lynn Richardson. She just finished chemotherapy - 3 weeks ago. When she went to her CT scan, her gallbladder was angry and had stones. She also has kidney stones and follows up with a urologist. She can eat but within a few minutes she had epigastric discomfort. 23: CTCAP:  1. No evidence for metastatic disease to the chest  2. The small right hepatic lesion has decreased in size and changed in  appearance. 3. Status post surgery at the rectosigmoid junction. No evidence for recurrent  mass  4. Bilateral renal calculi are nonobstructing  5. Gallstone    23: CTAP:  Gallstones and mild gallbladder wall thickening.     Past Medical History:   Diagnosis Date    Colon cancer ([address]) 10/2022    Ectopic pregnancy     Endometriosis     Fibromyalgia     Hypercholesteremia     PONV (postoperative nausea and vomiting)     PSVT (paroxysmal supraventricular tachycardia) (HCC)     Psychiatric disorder     anxiety and depression     Past Surgical History:   Procedure Laterality Date     [facility] N/A 10/19/2022    FLEXIBLE SIGMOIDOSCOPY performed by Aliyah Lloyd MD at [email or web address]     x1    GYN      right salpingectomy    HEENT      tubes left ear    IR PORT PLACEMENT EQUAL OR GREATER THAN 5 YEARS  2022    IR PORT PLACEMENT EQUAL OR GREATER THAN 5 YEARS 2022 [facility] RAD ANGIO IR    IR PORT PLACEMENT EQUAL OR GREATER THAN 5 YEARS  2022    ORTHOPEDIC SURGERY      right mensicus and ACL    PARTIAL HYSTERECTOMY (CERVIX NOT REMOVED)  2016     Social History     Socioeconomic History    Marital status:    Tobacco Use    Smoking status: Never    Smokeless tobacco: Never   Substance and Sexual Activity    Alcohol use: Yes    Drug use: Yes     Types: Marijuana

## 2023-08-09 ENCOUNTER — ANESTHESIA EVENT (OUTPATIENT)
Facility: HOSPITAL | Age: 48
End: 2023-08-09
Payer: COMMERCIAL

## 2023-08-09 ENCOUNTER — APPOINTMENT (OUTPATIENT)
Facility: HOSPITAL | Age: 48
End: 2023-08-09
Attending: SURGERY
Payer: COMMERCIAL

## 2023-08-09 ENCOUNTER — HOSPITAL ENCOUNTER (OUTPATIENT)
Facility: HOSPITAL | Age: 48
Setting detail: OBSERVATION
Discharge: HOME | End: 2023-08-10
Attending: SURGERY | Admitting: SURGERY
Payer: COMMERCIAL

## 2023-08-09 ENCOUNTER — ANESTHESIA (OUTPATIENT)
Facility: HOSPITAL | Age: 48
End: 2023-08-09
Payer: COMMERCIAL

## 2023-08-09 DIAGNOSIS — K80.20 CHOLELITHIASIS WITHOUT CHOLECYSTITIS: Primary | ICD-10-CM

## 2023-08-09 PROCEDURE — 3700000001 HC ADD 15 MINUTES (ANESTHESIA): Performed by: SURGERY

## 2023-08-09 PROCEDURE — 2500000003 HC RX 250 WO HCPCS: Performed by: NURSE ANESTHETIST, CERTIFIED REGISTERED

## 2023-08-09 PROCEDURE — 2580000003 HC RX 258: Performed by: STUDENT IN AN ORGANIZED HEALTH CARE EDUCATION/TRAINING PROGRAM

## 2023-08-09 PROCEDURE — 6360000002 HC RX W HCPCS: Performed by: SURGERY

## 2023-08-09 PROCEDURE — 74300 X-RAY BILE DUCTS/PANCREAS: CPT | Performed by: SURGERY

## 2023-08-09 PROCEDURE — 7100000001 HC PACU RECOVERY - ADDTL 15 MIN: Performed by: SURGERY

## 2023-08-09 PROCEDURE — 96372 THER/PROPH/DIAG INJ SC/IM: CPT

## 2023-08-09 PROCEDURE — 47563 LAPARO CHOLECYSTECTOMY/GRAPH: CPT | Performed by: SURGERY

## 2023-08-09 PROCEDURE — 6370000000 HC RX 637 (ALT 250 FOR IP): Performed by: SURGERY

## 2023-08-09 PROCEDURE — 2500000003 HC RX 250 WO HCPCS: Performed by: SURGERY

## 2023-08-09 PROCEDURE — G0378 HOSPITAL OBSERVATION PER HR: HCPCS

## 2023-08-09 PROCEDURE — 2580000003 HC RX 258: Performed by: SURGERY

## 2023-08-09 PROCEDURE — 6360000002 HC RX W HCPCS: Performed by: NURSE ANESTHETIST, CERTIFIED REGISTERED

## 2023-08-09 PROCEDURE — 7100000000 HC PACU RECOVERY - FIRST 15 MIN: Performed by: SURGERY

## 2023-08-09 PROCEDURE — 6360000004 HC RX CONTRAST MEDICATION: Performed by: SURGERY

## 2023-08-09 PROCEDURE — 88304 TISSUE EXAM BY PATHOLOGIST: CPT

## 2023-08-09 PROCEDURE — 6360000002 HC RX W HCPCS: Performed by: STUDENT IN AN ORGANIZED HEALTH CARE EDUCATION/TRAINING PROGRAM

## 2023-08-09 PROCEDURE — 6370000000 HC RX 637 (ALT 250 FOR IP): Performed by: STUDENT IN AN ORGANIZED HEALTH CARE EDUCATION/TRAINING PROGRAM

## 2023-08-09 PROCEDURE — 3600000014 HC SURGERY LEVEL 4 ADDTL 15MIN: Performed by: SURGERY

## 2023-08-09 PROCEDURE — 2709999900 HC NON-CHARGEABLE SUPPLY: Performed by: SURGERY

## 2023-08-09 PROCEDURE — 3700000000 HC ANESTHESIA ATTENDED CARE: Performed by: SURGERY

## 2023-08-09 PROCEDURE — 2720000010 HC SURG SUPPLY STERILE: Performed by: SURGERY

## 2023-08-09 PROCEDURE — 3600000004 HC SURGERY LEVEL 4 BASE: Performed by: SURGERY

## 2023-08-09 RX ORDER — ACETAMINOPHEN 325 MG/1
650 TABLET ORAL EVERY 6 HOURS
Status: DISCONTINUED | OUTPATIENT
Start: 2023-08-09 | End: 2023-08-10 | Stop reason: HOSPADM

## 2023-08-09 RX ORDER — BUPIVACAINE HYDROCHLORIDE AND EPINEPHRINE 5; 5 MG/ML; UG/ML
INJECTION, SOLUTION PERINEURAL PRN
Status: DISCONTINUED | OUTPATIENT
Start: 2023-08-09 | End: 2023-08-09 | Stop reason: ALTCHOICE

## 2023-08-09 RX ORDER — SODIUM CHLORIDE 0.9 % (FLUSH) 0.9 %
5-40 SYRINGE (ML) INJECTION EVERY 12 HOURS SCHEDULED
Status: DISCONTINUED | OUTPATIENT
Start: 2023-08-09 | End: 2023-08-09 | Stop reason: HOSPADM

## 2023-08-09 RX ORDER — SODIUM CHLORIDE 0.9 % (FLUSH) 0.9 %
5-40 SYRINGE (ML) INJECTION PRN
Status: DISCONTINUED | OUTPATIENT
Start: 2023-08-09 | End: 2023-08-09 | Stop reason: HOSPADM

## 2023-08-09 RX ORDER — PROPOFOL 10 MG/ML
INJECTION, EMULSION INTRAVENOUS PRN
Status: DISCONTINUED | OUTPATIENT
Start: 2023-08-09 | End: 2023-08-09 | Stop reason: SDUPTHER

## 2023-08-09 RX ORDER — OXYCODONE HYDROCHLORIDE 5 MG/1
10 TABLET ORAL EVERY 4 HOURS PRN
Status: DISCONTINUED | OUTPATIENT
Start: 2023-08-09 | End: 2023-08-10 | Stop reason: HOSPADM

## 2023-08-09 RX ORDER — SODIUM CHLORIDE, SODIUM LACTATE, POTASSIUM CHLORIDE, CALCIUM CHLORIDE 600; 310; 30; 20 MG/100ML; MG/100ML; MG/100ML; MG/100ML
INJECTION, SOLUTION INTRAVENOUS CONTINUOUS
Status: DISCONTINUED | OUTPATIENT
Start: 2023-08-09 | End: 2023-08-09 | Stop reason: HOSPADM

## 2023-08-09 RX ORDER — BUPROPION HYDROCHLORIDE 150 MG/1
300 TABLET ORAL DAILY
Status: DISCONTINUED | OUTPATIENT
Start: 2023-08-10 | End: 2023-08-10 | Stop reason: HOSPADM

## 2023-08-09 RX ORDER — FENTANYL CITRATE 50 UG/ML
INJECTION, SOLUTION INTRAMUSCULAR; INTRAVENOUS PRN
Status: DISCONTINUED | OUTPATIENT
Start: 2023-08-09 | End: 2023-08-09 | Stop reason: SDUPTHER

## 2023-08-09 RX ORDER — ROCURONIUM BROMIDE 10 MG/ML
INJECTION, SOLUTION INTRAVENOUS PRN
Status: DISCONTINUED | OUTPATIENT
Start: 2023-08-09 | End: 2023-08-09 | Stop reason: SDUPTHER

## 2023-08-09 RX ORDER — ONDANSETRON 2 MG/ML
4 INJECTION INTRAMUSCULAR; INTRAVENOUS
Status: DISCONTINUED | OUTPATIENT
Start: 2023-08-09 | End: 2023-08-09 | Stop reason: HOSPADM

## 2023-08-09 RX ORDER — HYDROMORPHONE HYDROCHLORIDE 1 MG/ML
0.5 INJECTION, SOLUTION INTRAMUSCULAR; INTRAVENOUS; SUBCUTANEOUS EVERY 5 MIN PRN
Status: DISCONTINUED | OUTPATIENT
Start: 2023-08-09 | End: 2023-08-09 | Stop reason: HOSPADM

## 2023-08-09 RX ORDER — DEXAMETHASONE SODIUM PHOSPHATE 4 MG/ML
INJECTION, SOLUTION INTRA-ARTICULAR; INTRALESIONAL; INTRAMUSCULAR; INTRAVENOUS; SOFT TISSUE PRN
Status: DISCONTINUED | OUTPATIENT
Start: 2023-08-09 | End: 2023-08-09 | Stop reason: SDUPTHER

## 2023-08-09 RX ORDER — DEXTROSE MONOHYDRATE 100 MG/ML
INJECTION, SOLUTION INTRAVENOUS CONTINUOUS PRN
Status: DISCONTINUED | OUTPATIENT
Start: 2023-08-09 | End: 2023-08-09 | Stop reason: HOSPADM

## 2023-08-09 RX ORDER — GABAPENTIN 100 MG/1
100 CAPSULE ORAL 3 TIMES DAILY
Status: DISCONTINUED | OUTPATIENT
Start: 2023-08-09 | End: 2023-08-10 | Stop reason: HOSPADM

## 2023-08-09 RX ORDER — OXYCODONE HYDROCHLORIDE 5 MG/1
5 TABLET ORAL EVERY 4 HOURS PRN
Status: DISCONTINUED | OUTPATIENT
Start: 2023-08-09 | End: 2023-08-10 | Stop reason: HOSPADM

## 2023-08-09 RX ORDER — CALCIUM CARBONATE 500 MG/1
1 TABLET, CHEWABLE ORAL DAILY
Status: DISCONTINUED | OUTPATIENT
Start: 2023-08-09 | End: 2023-08-10 | Stop reason: HOSPADM

## 2023-08-09 RX ORDER — PROCHLORPERAZINE EDISYLATE 5 MG/ML
5 INJECTION INTRAMUSCULAR; INTRAVENOUS
Status: COMPLETED | OUTPATIENT
Start: 2023-08-09 | End: 2023-08-09

## 2023-08-09 RX ORDER — LIDOCAINE HYDROCHLORIDE 10 MG/ML
1 INJECTION, SOLUTION EPIDURAL; INFILTRATION; INTRACAUDAL; PERINEURAL
Status: DISCONTINUED | OUTPATIENT
Start: 2023-08-09 | End: 2023-08-09 | Stop reason: HOSPADM

## 2023-08-09 RX ORDER — ONDANSETRON 2 MG/ML
INJECTION INTRAMUSCULAR; INTRAVENOUS PRN
Status: DISCONTINUED | OUTPATIENT
Start: 2023-08-09 | End: 2023-08-09 | Stop reason: SDUPTHER

## 2023-08-09 RX ORDER — POLYETHYLENE GLYCOL 3350 17 G/17G
17 POWDER, FOR SOLUTION ORAL DAILY
Status: DISCONTINUED | OUTPATIENT
Start: 2023-08-09 | End: 2023-08-10 | Stop reason: HOSPADM

## 2023-08-09 RX ORDER — PROCHLORPERAZINE EDISYLATE 5 MG/ML
10 INJECTION INTRAMUSCULAR; INTRAVENOUS EVERY 6 HOURS PRN
Status: DISCONTINUED | OUTPATIENT
Start: 2023-08-09 | End: 2023-08-10 | Stop reason: HOSPADM

## 2023-08-09 RX ORDER — DIPHENHYDRAMINE HCL 25 MG
25 CAPSULE ORAL EVERY 6 HOURS PRN
Status: DISCONTINUED | OUTPATIENT
Start: 2023-08-09 | End: 2023-08-10 | Stop reason: HOSPADM

## 2023-08-09 RX ORDER — HYDROMORPHONE HYDROCHLORIDE 2 MG/ML
INJECTION, SOLUTION INTRAMUSCULAR; INTRAVENOUS; SUBCUTANEOUS PRN
Status: DISCONTINUED | OUTPATIENT
Start: 2023-08-09 | End: 2023-08-09 | Stop reason: SDUPTHER

## 2023-08-09 RX ORDER — ONDANSETRON 2 MG/ML
4 INJECTION INTRAMUSCULAR; INTRAVENOUS EVERY 6 HOURS PRN
Status: DISCONTINUED | OUTPATIENT
Start: 2023-08-09 | End: 2023-08-10 | Stop reason: HOSPADM

## 2023-08-09 RX ORDER — HYDROMORPHONE HYDROCHLORIDE 1 MG/ML
0.5 INJECTION, SOLUTION INTRAMUSCULAR; INTRAVENOUS; SUBCUTANEOUS
Status: DISCONTINUED | OUTPATIENT
Start: 2023-08-09 | End: 2023-08-10 | Stop reason: HOSPADM

## 2023-08-09 RX ORDER — SUCCINYLCHOLINE/SOD CL,ISO/PF 200MG/10ML
SYRINGE (ML) INTRAVENOUS PRN
Status: DISCONTINUED | OUTPATIENT
Start: 2023-08-09 | End: 2023-08-09 | Stop reason: SDUPTHER

## 2023-08-09 RX ORDER — LANOLIN ALCOHOL/MO/W.PET/CERES
100 CREAM (GRAM) TOPICAL DAILY
Status: DISCONTINUED | OUTPATIENT
Start: 2023-08-10 | End: 2023-08-10 | Stop reason: HOSPADM

## 2023-08-09 RX ORDER — SODIUM CHLORIDE 9 MG/ML
INJECTION, SOLUTION INTRAVENOUS PRN
Status: DISCONTINUED | OUTPATIENT
Start: 2023-08-09 | End: 2023-08-09 | Stop reason: HOSPADM

## 2023-08-09 RX ORDER — ENOXAPARIN SODIUM 100 MG/ML
40 INJECTION SUBCUTANEOUS EVERY 24 HOURS
Status: DISCONTINUED | OUTPATIENT
Start: 2023-08-09 | End: 2023-08-10 | Stop reason: HOSPADM

## 2023-08-09 RX ORDER — MIDAZOLAM HYDROCHLORIDE 1 MG/ML
INJECTION INTRAMUSCULAR; INTRAVENOUS PRN
Status: DISCONTINUED | OUTPATIENT
Start: 2023-08-09 | End: 2023-08-09 | Stop reason: SDUPTHER

## 2023-08-09 RX ORDER — DILTIAZEM HYDROCHLORIDE 120 MG/1
120 CAPSULE, COATED, EXTENDED RELEASE ORAL DAILY
Status: DISCONTINUED | OUTPATIENT
Start: 2023-08-09 | End: 2023-08-10 | Stop reason: HOSPADM

## 2023-08-09 RX ORDER — LIDOCAINE HYDROCHLORIDE 20 MG/ML
INJECTION, SOLUTION EPIDURAL; INFILTRATION; INTRACAUDAL; PERINEURAL PRN
Status: DISCONTINUED | OUTPATIENT
Start: 2023-08-09 | End: 2023-08-09 | Stop reason: SDUPTHER

## 2023-08-09 RX ORDER — SODIUM CHLORIDE 0.9 % (FLUSH) 0.9 %
5-40 SYRINGE (ML) INJECTION EVERY 12 HOURS SCHEDULED
Status: DISCONTINUED | OUTPATIENT
Start: 2023-08-09 | End: 2023-08-10 | Stop reason: HOSPADM

## 2023-08-09 RX ORDER — SODIUM CHLORIDE 0.9 % (FLUSH) 0.9 %
5-40 SYRINGE (ML) INJECTION PRN
Status: DISCONTINUED | OUTPATIENT
Start: 2023-08-09 | End: 2023-08-10 | Stop reason: HOSPADM

## 2023-08-09 RX ORDER — SODIUM CHLORIDE 9 MG/ML
INJECTION, SOLUTION INTRAVENOUS PRN
Status: DISCONTINUED | OUTPATIENT
Start: 2023-08-09 | End: 2023-08-10 | Stop reason: HOSPADM

## 2023-08-09 RX ORDER — KETOROLAC TROMETHAMINE 30 MG/ML
15 INJECTION, SOLUTION INTRAMUSCULAR; INTRAVENOUS ONCE
Status: DISCONTINUED | OUTPATIENT
Start: 2023-08-09 | End: 2023-08-10 | Stop reason: HOSPADM

## 2023-08-09 RX ORDER — SODIUM CHLORIDE, SODIUM LACTATE, POTASSIUM CHLORIDE, CALCIUM CHLORIDE 600; 310; 30; 20 MG/100ML; MG/100ML; MG/100ML; MG/100ML
INJECTION, SOLUTION INTRAVENOUS CONTINUOUS
Status: DISCONTINUED | OUTPATIENT
Start: 2023-08-09 | End: 2023-08-10 | Stop reason: HOSPADM

## 2023-08-09 RX ORDER — SCOLOPAMINE TRANSDERMAL SYSTEM 1 MG/1
1 PATCH, EXTENDED RELEASE TRANSDERMAL
Status: DISCONTINUED | OUTPATIENT
Start: 2023-08-09 | End: 2023-08-09 | Stop reason: HOSPADM

## 2023-08-09 RX ORDER — SENNA AND DOCUSATE SODIUM 50; 8.6 MG/1; MG/1
1 TABLET, FILM COATED ORAL 2 TIMES DAILY
Status: DISCONTINUED | OUTPATIENT
Start: 2023-08-09 | End: 2023-08-10 | Stop reason: HOSPADM

## 2023-08-09 RX ORDER — ESCITALOPRAM OXALATE 10 MG/1
10 TABLET ORAL DAILY
Status: DISCONTINUED | OUTPATIENT
Start: 2023-08-09 | End: 2023-08-10 | Stop reason: HOSPADM

## 2023-08-09 RX ORDER — FENTANYL CITRATE 50 UG/ML
25 INJECTION, SOLUTION INTRAMUSCULAR; INTRAVENOUS EVERY 5 MIN PRN
Status: COMPLETED | OUTPATIENT
Start: 2023-08-09 | End: 2023-08-09

## 2023-08-09 RX ORDER — ONDANSETRON 4 MG/1
4 TABLET, ORALLY DISINTEGRATING ORAL EVERY 8 HOURS PRN
Status: DISCONTINUED | OUTPATIENT
Start: 2023-08-09 | End: 2023-08-10 | Stop reason: HOSPADM

## 2023-08-09 RX ADMIN — ONDANSETRON HYDROCHLORIDE 4 MG: 2 INJECTION, SOLUTION INTRAMUSCULAR; INTRAVENOUS at 16:52

## 2023-08-09 RX ADMIN — FENTANYL CITRATE 25 MCG: 0.05 INJECTION, SOLUTION INTRAMUSCULAR; INTRAVENOUS at 17:40

## 2023-08-09 RX ADMIN — HYDROMORPHONE HYDROCHLORIDE 0.5 MG: 1 INJECTION, SOLUTION INTRAMUSCULAR; INTRAVENOUS; SUBCUTANEOUS at 17:33

## 2023-08-09 RX ADMIN — WATER 2000 MG: 1 INJECTION INTRAMUSCULAR; INTRAVENOUS; SUBCUTANEOUS at 16:23

## 2023-08-09 RX ADMIN — PROCHLORPERAZINE EDISYLATE 5 MG: 5 INJECTION INTRAMUSCULAR; INTRAVENOUS at 17:28

## 2023-08-09 RX ADMIN — LIDOCAINE HYDROCHLORIDE 70 MG: 20 INJECTION, SOLUTION EPIDURAL; INFILTRATION; INTRACAUDAL; PERINEURAL at 16:17

## 2023-08-09 RX ADMIN — SODIUM CHLORIDE, POTASSIUM CHLORIDE, SODIUM LACTATE AND CALCIUM CHLORIDE 25 ML: 600; 310; 30; 20 INJECTION, SOLUTION INTRAVENOUS at 15:22

## 2023-08-09 RX ADMIN — HYDROMORPHONE HYDROCHLORIDE 0.2 MG: 2 INJECTION INTRAMUSCULAR; INTRAVENOUS; SUBCUTANEOUS at 16:35

## 2023-08-09 RX ADMIN — ENOXAPARIN SODIUM 40 MG: 100 INJECTION SUBCUTANEOUS at 22:23

## 2023-08-09 RX ADMIN — FENTANYL CITRATE 50 MCG: 50 INJECTION, SOLUTION INTRAMUSCULAR; INTRAVENOUS at 16:17

## 2023-08-09 RX ADMIN — HYDROMORPHONE HYDROCHLORIDE 0.4 MG: 2 INJECTION INTRAMUSCULAR; INTRAVENOUS; SUBCUTANEOUS at 16:36

## 2023-08-09 RX ADMIN — ACETAMINOPHEN 650 MG: 325 TABLET ORAL at 22:23

## 2023-08-09 RX ADMIN — SENNOSIDES AND DOCUSATE SODIUM 1 TABLET: 50; 8.6 TABLET ORAL at 22:23

## 2023-08-09 RX ADMIN — FENTANYL CITRATE 25 MCG: 0.05 INJECTION, SOLUTION INTRAMUSCULAR; INTRAVENOUS at 18:13

## 2023-08-09 RX ADMIN — PROPOFOL 150 MG: 10 INJECTION, EMULSION INTRAVENOUS at 16:21

## 2023-08-09 RX ADMIN — HYDROMORPHONE HYDROCHLORIDE 0.2 MG: 2 INJECTION INTRAMUSCULAR; INTRAVENOUS; SUBCUTANEOUS at 16:43

## 2023-08-09 RX ADMIN — SODIUM CHLORIDE, POTASSIUM CHLORIDE, SODIUM LACTATE AND CALCIUM CHLORIDE: 600; 310; 30; 20 INJECTION, SOLUTION INTRAVENOUS at 17:23

## 2023-08-09 RX ADMIN — GABAPENTIN 100 MG: 100 CAPSULE ORAL at 22:23

## 2023-08-09 RX ADMIN — FENTANYL CITRATE 50 MCG: 50 INJECTION, SOLUTION INTRAMUSCULAR; INTRAVENOUS at 16:31

## 2023-08-09 RX ADMIN — HYDROMORPHONE HYDROCHLORIDE 0.5 MG: 1 INJECTION, SOLUTION INTRAMUSCULAR; INTRAVENOUS; SUBCUTANEOUS at 17:26

## 2023-08-09 RX ADMIN — SODIUM CHLORIDE, PRESERVATIVE FREE 10 ML: 5 INJECTION INTRAVENOUS at 22:23

## 2023-08-09 RX ADMIN — FENTANYL CITRATE 25 MCG: 0.05 INJECTION, SOLUTION INTRAMUSCULAR; INTRAVENOUS at 17:48

## 2023-08-09 RX ADMIN — ROCURONIUM BROMIDE 25 MG: 10 INJECTION INTRAVENOUS at 16:25

## 2023-08-09 RX ADMIN — OXYCODONE HYDROCHLORIDE 10 MG: 5 TABLET ORAL at 22:28

## 2023-08-09 RX ADMIN — MIDAZOLAM 2 MG: 1 INJECTION INTRAMUSCULAR; INTRAVENOUS at 16:10

## 2023-08-09 RX ADMIN — ROCURONIUM BROMIDE 5 MG: 10 INJECTION INTRAVENOUS at 16:21

## 2023-08-09 RX ADMIN — Medication 1 AMPULE: at 22:38

## 2023-08-09 RX ADMIN — DIPHENHYDRAMINE HYDROCHLORIDE 25 MG: 25 CAPSULE ORAL at 22:28

## 2023-08-09 RX ADMIN — ONDANSETRON 4 MG: 2 INJECTION INTRAMUSCULAR; INTRAVENOUS at 22:30

## 2023-08-09 RX ADMIN — HYDROMORPHONE HYDROCHLORIDE 0.2 MG: 2 INJECTION INTRAMUSCULAR; INTRAVENOUS; SUBCUTANEOUS at 16:40

## 2023-08-09 RX ADMIN — DEXAMETHASONE SODIUM PHOSPHATE 8 MG: 4 INJECTION, SOLUTION INTRAMUSCULAR; INTRAVENOUS at 16:32

## 2023-08-09 RX ADMIN — FENTANYL CITRATE 25 MCG: 0.05 INJECTION, SOLUTION INTRAMUSCULAR; INTRAVENOUS at 18:54

## 2023-08-09 RX ADMIN — Medication 120 MG: at 16:21

## 2023-08-09 RX ADMIN — PROPOFOL 50 MCG/KG/MIN: 10 INJECTION, EMULSION INTRAVENOUS at 16:23

## 2023-08-09 ASSESSMENT — PAIN DESCRIPTION - LOCATION
LOCATION: ABDOMEN

## 2023-08-09 ASSESSMENT — PAIN DESCRIPTION - ORIENTATION
ORIENTATION: ANTERIOR;MID
ORIENTATION: MID
ORIENTATION: ANTERIOR;MID
ORIENTATION: MID
ORIENTATION: ANTERIOR;MID
ORIENTATION: ANTERIOR;MID
ORIENTATION: MID
ORIENTATION: ANTERIOR;MID

## 2023-08-09 ASSESSMENT — PAIN DESCRIPTION - DESCRIPTORS
DESCRIPTORS: SORE
DESCRIPTORS: ACHING
DESCRIPTORS: ACHING
DESCRIPTORS: SORE
DESCRIPTORS: ACHING
DESCRIPTORS: PRESSURE;ACHING
DESCRIPTORS: ACHING
DESCRIPTORS: ACHING;SORE
DESCRIPTORS: SORE
DESCRIPTORS: ACHING
DESCRIPTORS: ACHING;SORE
DESCRIPTORS: ACHING;SORE

## 2023-08-09 ASSESSMENT — PAIN DESCRIPTION - PAIN TYPE
TYPE: SURGICAL PAIN

## 2023-08-09 ASSESSMENT — PAIN SCALES - GENERAL
PAINLEVEL_OUTOF10: 4
PAINLEVEL_OUTOF10: 9
PAINLEVEL_OUTOF10: 5
PAINLEVEL_OUTOF10: 3
PAINLEVEL_OUTOF10: 4
PAINLEVEL_OUTOF10: 7
PAINLEVEL_OUTOF10: 5
PAINLEVEL_OUTOF10: 7
PAINLEVEL_OUTOF10: 4
PAINLEVEL_OUTOF10: 7
PAINLEVEL_OUTOF10: 4
PAINLEVEL_OUTOF10: 5
PAINLEVEL_OUTOF10: 6

## 2023-08-09 ASSESSMENT — PAIN - FUNCTIONAL ASSESSMENT
PAIN_FUNCTIONAL_ASSESSMENT: 0-10
PAIN_FUNCTIONAL_ASSESSMENT: PREVENTS OR INTERFERES SOME ACTIVE ACTIVITIES AND ADLS

## 2023-08-09 NOTE — FLOWSHEET NOTE
08/09/23 1712   Handoff   Communication Given Periop Handoff/Relief   Handoff phase Phase I receiving   Handoff Given To Cristian Setting RN   Handoff Received From Basim Harry RN/Yogesh Vila CRNA   Handoff Communication Face to Face; At bedside   Time Handoff Given 2209

## 2023-08-09 NOTE — OP NOTE
CHOLECYSTECTOMY OPERATIVE REPORT    8/9/2023        Pre-operative Diagnosis: Calculus of gallbladder with acute cholecystitis without obstruction [K80.00]    Post-operative Diagnosis: cholelithiasis      OPERATIVE PROCEDURE:  1. Laparoscopic cholecystectomy. 2.  Intraoperative cholangiogram with intraoperative interpretation. Surgeon: Jeovanny Torre MD    Assistant: Circulator: Mely Segovia RN  Surgical Assistant: Kat Villareal  Radiology Technologist: Rene Menezes  Scrub Person First: Carol Pax    Anesthesia: General plus Local    Estimated Blood Loss: Minimal    FINDINGS:   The patient was noted to have a gallbladder with stones. Intraoperative cholangiogram was obtained. Radiologist was not present during the case. Intraoperative interpretation revealed filling of a normal length cystic duct, a common bile duct, and all proximal and distal structures with free flow of contrast into the duodenum without any filling defects noted. Yes and There was limited filling of the proximal ducts due to rapid flow of contrast into the duodenum. The liver appeared normal, and the remaining abdominal cavity appeared normal.    SPECIMEN:    ID Type Source Tests Collected by Time Destination   A : gallbladder Tissue Gallbladder SURGICAL PATHOLOGY Jeovanny Torre MD 8/9/2023 1644         DRAINS:  None. COMPLICATIONS:  None. DESCRIPTION OF PROCEDURE:   Patient was taken to the operating room and placed on the table in supine position. Time-outs were performed using both preinduction and pre-incision safety checklist to verify correct patient, procedure, site, and additional critical information prior to beginning the procedure. General anesthesia was initiated and patient intubated. Patient was then prepped and draped in a sterile fashion. A periumbilical incision was then made. A 5-mm 0-degree laparoscope was inserted into a 5-mm Optiview Trocar. The peritoneum was entered under direct visualization.

## 2023-08-09 NOTE — FLOWSHEET NOTE
08/09/23 1751   Family Communication    Relationship to Patient Spouse    Phone Number SarahSpouse  [phone]   Family/Significant Other Update Called   Delivery Origin Nurse   Update Given Yes   Family Communication   Family Update Message Patient stable

## 2023-08-09 NOTE — INTERVAL H&P NOTE
Update History & Physical    The patient's History and Physical was reviewed with the patient and I examined the patient. There was no change. The surgical site was confirmed by the patient and me. Plan: The risks, benefits, expected outcome, and alternative to the recommended procedure have been discussed with the patient. Patient understands and wants to proceed with the procedure.      Electronically signed by Mynor Askew MD on 8/9/2023 at 2:26 PM

## 2023-08-09 NOTE — ANESTHESIA POSTPROCEDURE EVALUATION
Department of Anesthesiology  Postprocedure Note    Patient: Gilda Mayes  MRN: [id number]  YOB: 1975  Date of evaluation: 8/9/2023      Procedure Summary     Date: 08/09/23 Room / Location: [facility] MAIN OR  / [facility] MAIN OR    Anesthesia Start: 1610 Anesthesia Stop: 6868    Procedure: CHOLECYSTECTOMY LAPAROSCOPIC CHOLANGIOGRAM (Abdomen) Diagnosis:       Calculus of gallbladder with acute cholecystitis without obstruction      (Calculus of gallbladder with acute cholecystitis without obstruction [K80.00])    Providers: Lilly Hinds MD Responsible Provider: Aurelia Mclain MD    Anesthesia Type: General ASA Status: 2          Anesthesia Type: General    Dejon Phase I: Dejon Score: 8    Dejon Phase II:        Anesthesia Post Evaluation    Patient location during evaluation: PACU  Patient participation: complete - patient participated  Level of consciousness: awake and alert  Airway patency: patent  Nausea & Vomiting: no nausea  Complications: no  Cardiovascular status: hemodynamically stable  Respiratory status: acceptable  Hydration status: euvolemic  Pain management: adequate

## 2023-08-09 NOTE — DISCHARGE INSTRUCTIONS
Dr. Opal Jovel Discharge Instructions after  Laparoscopic Cholecystectomy      Zamzam Umana   [id number] : 1975    Admitted 2023 Discharged: 2023       What to do at Home    Recommended diet: Low Fat Diet for 1 month    Recommended activity: as tolerated, do not drive for 3-5 days while on pain medicine. Follow-up with Dr. Mitesh Polo  in 2 weeks. Call [phone] for an appointment. Cholecystectomy: What to Expect at [address] [address] [place]    After your surgery, it is normal to feel weak and tired for several days after you return home. Your belly may be swollen. If you had laparoscopic surgery, you may also have pain in your shoulder for about 24 hours. You may have gas or need to burp a lot at first, and a few people get diarrhea. The diarrhea usually goes away in 2 to 4 weeks, but it may last longer. How quickly you recover depends on whether you had a laparoscopic or open surgery. For a laparoscopic surgery, most people can go back to work or their normal routine in 1 to 2 weeks, but it may take longer, depending on the type of work you do. For an open surgery, it will probably take 4 to 6 weeks before you get back to your normal routine. This care sheet gives you a general idea about how long it will take for you to recover. However, each person recovers at a different pace. Follow the steps below to get better as quickly as possible. How can you care for yourself at home? Activity  Rest when you feel tired. Getting enough sleep will help you recover. Try to walk each day. Start out by walking a little more than you did the day before. Gradually increase the amount you walk. Walking boosts blood flow and helps prevent pneumonia and constipation. Avoid strenuous activities, such as biking, jogging, weightlifting, and aerobic exercise, for 1-2 weeks. You may shower 24 hours after surgery. Pat the cut (incision) dry.  Do not take a bath for the first 2 weeks, or until your doctor tells you it is okay. You may drive when you are no longer taking pain medicine and can quickly move your foot from the gas pedal to the brake. You must also be able to sit comfortably for a long period of time, even if you do not plan to go far. For a laparoscopic surgery, most people can go back to work or their normal routine in 1 to 2 weeks, but it may take longer. For an open surgery, it will probably take 4 to 6 weeks before you get back to your normal routine. Diet  Eat smaller meals more often instead of fewer larger meals. You can eat a normal diet, but avoid eating fatty foods for about 1 month. Fatty foods include hamburger, whole milk, cheese, and many snack foods. If your stomach is upset, try bland, low-fat foods like plain rice, broiled chicken, toast, and yogurt. Drink plenty of fluids (unless your doctor tells you not to). If you have diarrhea, try avoiding spicy foods, dairy products, fatty foods, and alcohol. You can also watch to see if specific foods cause it, and stop eating them. If the diarrhea continues for more than 2 weeks, talk to your doctor. You may notice that your bowel movements are not regular right after your surgery. This is common. Try to avoid constipation and straining with bowel movements. You may want to take a fiber supplement every day. If you have not had a bowel movement after a couple of days, ask your doctor about taking a mild laxative. Medicines  Take pain medicines exactly as directed. If the doctor gave you a prescription medicine for pain, take it as prescribed. If you are not taking a prescription pain medicine, take an over-the-counter medicine such as acetaminophen (Tylenol), ibuprofen (Advil, Motrin), or naproxen (Aleve). Read and follow all instructions on the label. Do not take two or more pain medicines at the same time unless the doctor told you to. Many pain medicines contain acetaminophen, which is Tylenol.  Too much Tylenol can

## 2023-08-10 VITALS
WEIGHT: 160.05 LBS | HEIGHT: 66 IN | BODY MASS INDEX: 25.72 KG/M2 | RESPIRATION RATE: 16 BRPM | HEART RATE: 71 BPM | SYSTOLIC BLOOD PRESSURE: 103 MMHG | TEMPERATURE: 97.9 F | OXYGEN SATURATION: 95 % | DIASTOLIC BLOOD PRESSURE: 76 MMHG

## 2023-08-10 PROCEDURE — 6360000002 HC RX W HCPCS: Performed by: SURGERY

## 2023-08-10 PROCEDURE — 96361 HYDRATE IV INFUSION ADD-ON: CPT

## 2023-08-10 PROCEDURE — 6370000000 HC RX 637 (ALT 250 FOR IP): Performed by: SURGERY

## 2023-08-10 PROCEDURE — 96374 THER/PROPH/DIAG INJ IV PUSH: CPT

## 2023-08-10 PROCEDURE — 2580000003 HC RX 258: Performed by: SURGERY

## 2023-08-10 PROCEDURE — G0378 HOSPITAL OBSERVATION PER HR: HCPCS

## 2023-08-10 PROCEDURE — 99024 POSTOP FOLLOW-UP VISIT: CPT | Performed by: NURSE PRACTITIONER

## 2023-08-10 RX ORDER — IBUPROFEN 600 MG/1
600 TABLET ORAL EVERY 6 HOURS PRN
Qty: 30 TABLET | Refills: 0 | Status: SHIPPED | OUTPATIENT
Start: 2023-08-10

## 2023-08-10 RX ORDER — OXYCODONE HYDROCHLORIDE 5 MG/1
5 TABLET ORAL EVERY 6 HOURS PRN
Qty: 10 TABLET | Refills: 0 | Status: SHIPPED | OUTPATIENT
Start: 2023-08-10 | End: 2023-08-13

## 2023-08-10 RX ORDER — SENNA AND DOCUSATE SODIUM 50; 8.6 MG/1; MG/1
1 TABLET, FILM COATED ORAL 2 TIMES DAILY
Qty: 30 TABLET | Refills: 0 | Status: SHIPPED | OUTPATIENT
Start: 2023-08-10 | End: 2023-08-25

## 2023-08-10 RX ORDER — POLYETHYLENE GLYCOL 3350 17 G/17G
17 POWDER, FOR SOLUTION ORAL 2 TIMES DAILY
Qty: 507 G | Refills: 0 | Status: SHIPPED | OUTPATIENT
Start: 2023-08-10 | End: 2023-08-25

## 2023-08-10 RX ADMIN — Medication 1 AMPULE: at 09:20

## 2023-08-10 RX ADMIN — ESCITALOPRAM OXALATE 10 MG: 10 TABLET ORAL at 09:21

## 2023-08-10 RX ADMIN — SODIUM CHLORIDE, PRESERVATIVE FREE 10 ML: 5 INJECTION INTRAVENOUS at 09:22

## 2023-08-10 RX ADMIN — GABAPENTIN 100 MG: 100 CAPSULE ORAL at 09:20

## 2023-08-10 RX ADMIN — CALCIUM CARBONATE (ANTACID) CHEW TAB 500 MG 500 MG: 500 CHEW TAB at 09:21

## 2023-08-10 RX ADMIN — SENNOSIDES AND DOCUSATE SODIUM 1 TABLET: 50; 8.6 TABLET ORAL at 09:21

## 2023-08-10 RX ADMIN — HYDROMORPHONE HYDROCHLORIDE 0.5 MG: 1 INJECTION, SOLUTION INTRAMUSCULAR; INTRAVENOUS; SUBCUTANEOUS at 06:34

## 2023-08-10 RX ADMIN — BUPROPION HYDROCHLORIDE 300 MG: 150 TABLET, EXTENDED RELEASE ORAL at 09:20

## 2023-08-10 RX ADMIN — PYRIDOXINE HCL TAB 50 MG 100 MG: 50 TAB at 09:20

## 2023-08-10 RX ADMIN — DILTIAZEM HYDROCHLORIDE 120 MG: 120 CAPSULE, EXTENDED RELEASE ORAL at 09:21

## 2023-08-10 RX ADMIN — POLYETHYLENE GLYCOL 3350 17 G: 17 POWDER, FOR SOLUTION ORAL at 09:22

## 2023-08-10 ASSESSMENT — PAIN SCALES - GENERAL
PAINLEVEL_OUTOF10: 6
PAINLEVEL_OUTOF10: 5
PAINLEVEL_OUTOF10: 3
PAINLEVEL_OUTOF10: 0

## 2023-08-10 ASSESSMENT — PAIN DESCRIPTION - DESCRIPTORS: DESCRIPTORS: ACHING

## 2023-08-10 ASSESSMENT — PAIN DESCRIPTION - LOCATION
LOCATION: ABDOMEN

## 2023-08-10 ASSESSMENT — PAIN DESCRIPTION - ORIENTATION: ORIENTATION: MID

## 2023-08-10 NOTE — PROGRESS NOTES
End of Shift Note    Bedside shift change report given to Luis Angel Mary (oncoming nurse) by Delray Kawasaki, RN (offgoing nurse). Report included the following information SBAR, Kardex, Intake/Output, and MAR    Shift worked:  [id number]     Shift summary and any significant changes:    Pt arrived on the unit from PACU alert and oriented times 4 after receiving a lap vidya, c/o pain 7/10 to abdominal incisions, and intermittent nausea. Pt medicated per [facility] w/ routine and PRN pain meds. Pt was able to void 450 mL initially and a few times throughout the night with standby assistance from this RN. Ambulation tolerated well. Pt was oriented to the unit, call bell usage explained, admission assessment complete, dual skin assessment complete, bed in lowest position, call light w/in reach. Pt initially on [facility], this RN removed d/t pt sats ok. PIV patent w/ LR 50 mL infusing. IS and education provided. Pt able to demonstrate understanding through teach back method. Concerns for physician to address:       Zone phone for oncoming shift:          Activity:  Ambulate patient, Misc nursing order (specify), Misc nursing order (specify)    Cardiac:   Cardiac Monitoring:   YES/NO: No    Access:  Current line(s): PIV    Genitourinary:   Urinary Status: Voiding, Bathroom privileges    Respiratory:   O2 Device: None (Room air)    GI:  Current diet: ADULT DIET;  Regular  Tolerating current diet: YES    Pain Management:   Patient states pain is manageable on current regimen: YES    Skin:  Ming Scale Score: 21    Patient Safety:  Nursing Judgement-Fall Risk High(Add Comments): No    Length of Stay:  Actual LOS: 0      Delray Kawasaki, RN

## 2023-08-10 NOTE — PLAN OF CARE
Problem: ABCDS Injury Assessment  Goal: Absence of physical injury  Outcome: Progressing  Flowsheets (Taken 8/9/2023 2043)  Absence of Physical Injury: Implement safety measures based on patient assessment     Problem: Pain  Goal: Verbalizes/displays adequate comfort level or baseline comfort level  Outcome: Progressing  Flowsheets (Taken 8/9/2023 2044)  Verbalizes/displays adequate comfort level or baseline comfort level:   Encourage patient to monitor pain and request assistance   Assess pain using appropriate pain scale   Administer analgesics based on type and severity of pain and evaluate response   Implement non-pharmacological measures as appropriate and evaluate response     Problem: Discharge Planning  Goal: Discharge to home or other facility with appropriate resources  Outcome: Progressing     Problem: Safety - Adult  Goal: Free from fall injury  Outcome: Progressing

## 2023-08-10 NOTE — PROGRESS NOTES

## 2023-08-10 NOTE — CARE COORDINATION
Note Update:    CM attempted to complete an assessment with pt however pt is not the room. Assigned RN reports that pt has left. No IM letter was needed at d/c. Pt was on observation. No further CM needs identified for pt.     ALEJANDRO Nuno    [phone]

## 2023-08-22 ENCOUNTER — OFFICE VISIT (OUTPATIENT)
Age: 48
End: 2023-08-22

## 2023-08-22 VITALS
DIASTOLIC BLOOD PRESSURE: 72 MMHG | HEIGHT: 66 IN | HEART RATE: 59 BPM | WEIGHT: 160 LBS | OXYGEN SATURATION: 97 % | TEMPERATURE: 97.3 F | BODY MASS INDEX: 25.71 KG/M2 | RESPIRATION RATE: 16 BRPM | SYSTOLIC BLOOD PRESSURE: 118 MMHG

## 2023-08-22 DIAGNOSIS — K80.20 CALCULUS OF GALLBLADDER WITHOUT CHOLECYSTITIS WITHOUT OBSTRUCTION: Primary | ICD-10-CM

## 2023-08-22 PROCEDURE — 99024 POSTOP FOLLOW-UP VISIT: CPT | Performed by: SURGERY

## 2023-08-22 RX ORDER — OXYCODONE HYDROCHLORIDE 5 MG/1
5 TABLET ORAL EVERY 6 HOURS PRN
Qty: 15 TABLET | Refills: 0 | Status: SHIPPED | OUTPATIENT
Start: 2023-08-22 | End: 2023-08-26

## 2023-09-11 ENCOUNTER — TELEPHONE (OUTPATIENT)
Age: 48
End: 2023-09-11

## 2023-09-11 NOTE — TELEPHONE ENCOUNTER
Patient called in regards to post gallbladder surgery pain in umbilical region requesting to speak to nurse    21

## 2023-09-12 ENCOUNTER — TELEPHONE (OUTPATIENT)
Age: 48
End: 2023-09-12

## 2023-09-12 NOTE — TELEPHONE ENCOUNTER
Pt does not have any bulging in umbilical area. I explained there is a healing ridge that is normal and can take weeks to months to go down. I recommended Pt keep track of what she is eating when she has the pain and she may have to modify diet. Pt verbalized understanding.

## 2024-06-10 ENCOUNTER — TRANSCRIBE ORDERS (OUTPATIENT)
Facility: HOSPITAL | Age: 49
End: 2024-06-10

## 2024-06-10 DIAGNOSIS — R93.2 NONVISUALIZATION OF GALLBLADDER: ICD-10-CM

## 2024-06-10 DIAGNOSIS — B37.9 INFECTION DUE TO CANDIDA SPECIES: Primary | ICD-10-CM

## 2024-06-20 ENCOUNTER — HOSPITAL ENCOUNTER (OUTPATIENT)
Facility: HOSPITAL | Age: 49
End: 2024-06-20
Payer: COMMERCIAL

## 2024-06-20 VITALS
BODY MASS INDEX: 21.69 KG/M2 | TEMPERATURE: 98.1 F | RESPIRATION RATE: 17 BRPM | DIASTOLIC BLOOD PRESSURE: 71 MMHG | OXYGEN SATURATION: 100 % | SYSTOLIC BLOOD PRESSURE: 111 MMHG | WEIGHT: 135 LBS | HEIGHT: 66 IN | HEART RATE: 69 BPM

## 2024-06-20 DIAGNOSIS — R16.0 HYPODENSE MASS OF LIVER: ICD-10-CM

## 2024-06-20 PROCEDURE — 6360000002 HC RX W HCPCS: Performed by: STUDENT IN AN ORGANIZED HEALTH CARE EDUCATION/TRAINING PROGRAM

## 2024-06-20 PROCEDURE — 6370000000 HC RX 637 (ALT 250 FOR IP): Performed by: STUDENT IN AN ORGANIZED HEALTH CARE EDUCATION/TRAINING PROGRAM

## 2024-06-20 PROCEDURE — 88342 IMHCHEM/IMCYTCHM 1ST ANTB: CPT

## 2024-06-20 PROCEDURE — 88307 TISSUE EXAM BY PATHOLOGIST: CPT

## 2024-06-20 PROCEDURE — 88333 PATH CONSLTJ SURG CYTO XM 1: CPT

## 2024-06-20 PROCEDURE — 88341 IMHCHEM/IMCYTCHM EA ADD ANTB: CPT

## 2024-06-20 PROCEDURE — 2709999900 US BIOPSY LIVER PERCUTANEOUS

## 2024-06-20 RX ORDER — MIDAZOLAM HYDROCHLORIDE 5 MG/5ML
INJECTION, SOLUTION INTRAMUSCULAR; INTRAVENOUS PRN
Status: COMPLETED | OUTPATIENT
Start: 2024-06-20 | End: 2024-06-20

## 2024-06-20 RX ORDER — FENTANYL CITRATE 50 UG/ML
INJECTION, SOLUTION INTRAMUSCULAR; INTRAVENOUS PRN
Status: COMPLETED | OUTPATIENT
Start: 2024-06-20 | End: 2024-06-20

## 2024-06-20 RX ORDER — HYDROMORPHONE HYDROCHLORIDE 1 MG/ML
1 INJECTION, SOLUTION INTRAMUSCULAR; INTRAVENOUS; SUBCUTANEOUS
Status: DISCONTINUED | OUTPATIENT
Start: 2024-06-20 | End: 2024-06-20

## 2024-06-20 RX ORDER — FENTANYL CITRATE 50 UG/ML
25 INJECTION, SOLUTION INTRAMUSCULAR; INTRAVENOUS
Status: DISCONTINUED | OUTPATIENT
Start: 2024-06-20 | End: 2024-06-20

## 2024-06-20 RX ORDER — KETOROLAC TROMETHAMINE 30 MG/ML
30 INJECTION, SOLUTION INTRAMUSCULAR; INTRAVENOUS ONCE
Status: COMPLETED | OUTPATIENT
Start: 2024-06-20 | End: 2024-06-20

## 2024-06-20 RX ORDER — DIPHENHYDRAMINE HYDROCHLORIDE 50 MG/ML
50 INJECTION INTRAMUSCULAR; INTRAVENOUS ONCE
Status: COMPLETED | OUTPATIENT
Start: 2024-06-20 | End: 2024-06-20

## 2024-06-20 RX ORDER — ACETAMINOPHEN 325 MG/1
650 TABLET ORAL EVERY 4 HOURS PRN
Status: DISCONTINUED | OUTPATIENT
Start: 2024-06-20 | End: 2024-06-20

## 2024-06-20 RX ORDER — FENTANYL CITRATE 50 UG/ML
25 INJECTION, SOLUTION INTRAMUSCULAR; INTRAVENOUS
Status: CANCELLED | OUTPATIENT
Start: 2024-06-20

## 2024-06-20 RX ADMIN — FENTANYL CITRATE 50 MCG: 50 INJECTION INTRAMUSCULAR; INTRAVENOUS at 11:50

## 2024-06-20 RX ADMIN — MIDAZOLAM HYDROCHLORIDE 1 MG: 1 INJECTION, SOLUTION INTRAMUSCULAR; INTRAVENOUS at 11:20

## 2024-06-20 RX ADMIN — ACETAMINOPHEN 650 MG: 325 TABLET ORAL at 11:45

## 2024-06-20 RX ADMIN — DIPHENHYDRAMINE HYDROCHLORIDE 50 MG: 50 INJECTION INTRAMUSCULAR; INTRAVENOUS at 13:23

## 2024-06-20 RX ADMIN — FENTANYL CITRATE 25 MCG: 50 INJECTION INTRAMUSCULAR; INTRAVENOUS at 11:15

## 2024-06-20 RX ADMIN — KETOROLAC TROMETHAMINE 30 MG: 30 INJECTION, SOLUTION INTRAMUSCULAR; INTRAVENOUS at 13:50

## 2024-06-20 RX ADMIN — FENTANYL CITRATE 25 MCG: 50 INJECTION INTRAMUSCULAR; INTRAVENOUS at 12:50

## 2024-06-20 RX ADMIN — MIDAZOLAM HYDROCHLORIDE 1 MG: 1 INJECTION, SOLUTION INTRAMUSCULAR; INTRAVENOUS at 11:15

## 2024-06-20 RX ADMIN — MIDAZOLAM HYDROCHLORIDE 2 MG: 1 INJECTION, SOLUTION INTRAMUSCULAR; INTRAVENOUS at 11:10

## 2024-06-20 RX ADMIN — FENTANYL CITRATE 50 MCG: 50 INJECTION INTRAMUSCULAR; INTRAVENOUS at 11:10

## 2024-06-20 ASSESSMENT — PAIN DESCRIPTION - PAIN TYPE
TYPE: SURGICAL PAIN
TYPE: SURGICAL PAIN

## 2024-06-20 ASSESSMENT — PAIN SCALES - GENERAL
PAINLEVEL_OUTOF10: 7
PAINLEVEL_OUTOF10: 7
PAINLEVEL_OUTOF10: 3
PAINLEVEL_OUTOF10: 9
PAINLEVEL_OUTOF10: 5

## 2024-06-20 ASSESSMENT — PAIN DESCRIPTION - FREQUENCY
FREQUENCY: INTERMITTENT

## 2024-06-20 ASSESSMENT — PAIN DESCRIPTION - LOCATION
LOCATION: ABDOMEN

## 2024-06-20 ASSESSMENT — PAIN DESCRIPTION - ONSET: ONSET: OTHER (COMMENT)

## 2024-06-20 ASSESSMENT — PAIN DESCRIPTION - ORIENTATION
ORIENTATION: RIGHT;UPPER

## 2024-06-20 ASSESSMENT — PAIN DESCRIPTION - DESCRIPTORS
DESCRIPTORS: ACHING;CRAMPING
DESCRIPTORS: ACHING;CRAMPING;SHARP
DESCRIPTORS: ACHING;CRAMPING;SORE

## 2024-06-20 NOTE — DISCHARGE INSTRUCTIONS
[facility]  Special Procedures/Radiology Department    Radiologist: Daysi Pagan NP    Date: 6/20/2024    Liver Biopsy Discharge Instructions    You may have an aching pain in the biopsy site tonight.  Take Tylenol, as directed on the label, for pain or discomfort.  Avoid ibuprofen (Advil, Motrin) and aspirin for the next 48 hours as these drugs may cause you to bleed.    Resume your previous diet and follow the medication reconciliation form.    Rest today.  Do not drive or sign any legal documents activity for 24 hours because you received sedation medications.      Avoid any strenuous activity for 24 hours.  Do not lift anything heavier than a small grocery bag (10 pounds) and avoid twisting for the next 5 days.    If you experience severe sweating, severe abdominal pain, dizziness or faintness, go to the nearest Emergency Room immediately.  Pain under the left collar bone is normal.    Watch for signs of infection at biopsy site:  redness, pain, drainage, fever chills.  If this occurs, call you doctor.    Contact your physician after 5 business days for test results.    If you have any questions or concerns, please call [phone] and ask to speak to the nurse on-call.

## 2024-06-20 NOTE — H&P
N/A    Impression  1. No evidence for metastatic disease to the chest  2. The small right hepatic lesion has decreased in size and changed in  appearance.  3. Status post surgery at the rectosigmoid junction. No evidence for recurrent  mass  4. Bilateral renal calculi are nonobstructing  5. Gallstone      LABS  Lab Results   Component Value Date/Time    WBC 6.6 11/13/2022 03:38 AM    HGB 12.1 11/13/2022 03:38 AM    HCT 37.2 11/13/2022 03:38 AM     11/13/2022 03:38 AM    MCV 99.5 11/13/2022 03:38 AM     Lab Results   Component Value Date/Time     11/13/2022 03:38 AM    K 3.8 11/13/2022 03:38 AM     11/13/2022 03:38 AM    CO2 32 11/13/2022 03:38 AM    BUN 8 11/13/2022 03:38 AM    GFRAA 56 08/10/2022 12:26 AM     Lab Results   Component Value Date/Time    INR 1.0 11/02/2022 04:17 PM         PHYSICAL EXAM   Patient Vitals for the past 24 hrs:   BP Temp Temp src Pulse Resp SpO2 Height Weight   06/20/24 1345 129/74 -- -- 64 16 100 % -- --   06/20/24 1330 (!) 146/90 -- -- 65 15 96 % -- --   06/20/24 1315 114/84 -- -- 62 16 100 % -- --   06/20/24 1300 136/75 -- -- 58 16 100 % -- --   06/20/24 1245 125/77 -- -- 68 28 100 % -- --   06/20/24 1230 133/76 -- -- 69 24 100 % -- --   06/20/24 1215 125/75 -- -- 67 26 100 % -- --   06/20/24 1200 121/75 -- -- 61 22 100 % -- --   06/20/24 1155 117/66 -- -- 62 15 97 % -- --   06/20/24 1150 132/76 -- -- 67 19 98 % -- --   06/20/24 1140 125/77 -- -- 64 16 98 % -- --   06/20/24 1130 (!) 103/58 -- -- 59 16 97 % -- --   06/20/24 1125 (!) 97/53 -- -- 58 17 97 % -- --   06/20/24 1120 (!) 100/57 -- -- 57 17 96 % -- --   06/20/24 1115 (!) 100/54 -- -- 59 12 98 % -- --   06/20/24 1110 (!) 107/48 -- -- 62 13 100 % -- --   06/20/24 1013 108/72 98.1 °F (36.7 °C) Oral 67 13 99 % 1.676 m (5' 6\") 61.2 kg (135 lb)      General:  NAD  Heart:  RRR  Lungs:  NWOB  Neurological:  AAOX3      ASSESSMENT / PLAN   Procedure to be performed:  US guided biopsy of liver lesion  Plan for sedation:   Conscious sedation  Mallampati Airway Assessment:  I (soft palate, uvula, fauces, tonsillar pillars visible)  ASA Classification:  ASA 2 - Patient with mild systemic disease with no functional limitations  Post procedure plan:  observation per protocol  Informed consent:  indication, risks and alternatives of the planned procedure and sedation methods reviewed with the patient / family who agree to proceed  Code status:  Full      JAYLYN BANSAL - NP  [facility]

## 2024-06-20 NOTE — PROGRESS NOTES
1010    Pt arrived to XR recovery for Liver BX . Pt is A/O x4 with no complaints of pain. VS to be collected and consent to be obtained. Pt bed is locked and in lowest position and call bell is within reach.    1130    Name of procedure: Liver BX    Sedation medications given:    Versed: 4 mg    Fentanyl: 75 mcg    Reversal Agent Used: None    Sedation tolerated: Yes    Sedation start:  1110    Sedation end:  1130    Vital Signs: Stable    Fluids removed: None    Samples sent to lab: By Gustavo in Cytology    Any complications related to procedure: None    Post Procedure Care Needed/order sets placed in [facility] care.     Patient is at increased fall risk due to medication given.    1140    Pt with c/o 9/10 abd pain post procedure. Spoke with Dr. Mata new orders received.     1230    Pt with c/o 7/10 intermittent abd pain post med administration. Spoke with MARIELLE Tavarez. New orders received.     1247     Unable to get pain med verified by pharmacy. Spoke with MARIELLE Tavarez and new orders received.     1320    Pt with c/o itching and 7/10 intermittent abd pain. Spoke with provider new orders received.     1404    Pt ambulated to the bathroom. States pain is currently at 5/10.       1420    Pt states she is ready for discharge. Pain currently at 3/10. Site is [facility].       1430    Pt d/c home with friend. Pt tolerating PO intake and procedure site is [facility]. I reviewed all d/c instructions. Pt stated understanding. Removed all IV/tele. Pt left with all personal belongings.

## 2024-10-18 ENCOUNTER — HOSPITAL ENCOUNTER (OUTPATIENT)
Facility: HOSPITAL | Age: 49
Discharge: HOME | End: 2024-10-20
Attending: INTERNAL MEDICINE
Payer: COMMERCIAL

## 2024-10-18 DIAGNOSIS — M79.662 BILATERAL CALF PAIN: ICD-10-CM

## 2024-10-18 DIAGNOSIS — M79.661 BILATERAL CALF PAIN: ICD-10-CM

## 2024-10-18 DIAGNOSIS — C18.7 MALIGNANT NEOPLASM OF SIGMOID COLON (HCC): ICD-10-CM

## 2024-10-18 PROCEDURE — 93971 EXTREMITY STUDY: CPT

## 2025-05-15 ENCOUNTER — HOSPITAL ENCOUNTER (OUTPATIENT)
Facility: HOSPITAL | Age: 50
Discharge: HOME | End: 2025-05-18
Attending: INTERNAL MEDICINE
Payer: COMMERCIAL

## 2025-05-15 VITALS
TEMPERATURE: 98 F | RESPIRATION RATE: 13 BRPM | HEIGHT: 66 IN | OXYGEN SATURATION: 94 % | SYSTOLIC BLOOD PRESSURE: 134 MMHG | DIASTOLIC BLOOD PRESSURE: 78 MMHG | WEIGHT: 154 LBS | HEART RATE: 55 BPM | BODY MASS INDEX: 24.75 KG/M2

## 2025-05-15 DIAGNOSIS — C78.7 METASTASIS TO LIVER (HCC): ICD-10-CM

## 2025-05-15 PROCEDURE — 6360000002 HC RX W HCPCS: Performed by: RADIOLOGY

## 2025-05-15 PROCEDURE — 2500000003 HC RX 250 WO HCPCS: Performed by: RADIOLOGY

## 2025-05-15 PROCEDURE — 99153 MOD SED SAME PHYS/QHP EA: CPT

## 2025-05-15 PROCEDURE — 2720000010 CT RFA LIVER TUMOR(S) PERC

## 2025-05-15 PROCEDURE — 6370000000 HC RX 637 (ALT 250 FOR IP): Performed by: RADIOLOGY

## 2025-05-15 PROCEDURE — 76942 ECHO GUIDE FOR BIOPSY: CPT

## 2025-05-15 RX ORDER — OXYCODONE AND ACETAMINOPHEN 5; 325 MG/1; MG/1
2 TABLET ORAL EVERY 6 HOURS PRN
Qty: 28 TABLET | Refills: 0 | Status: SHIPPED | OUTPATIENT
Start: 2025-05-15 | End: 2025-05-22

## 2025-05-15 RX ORDER — HYDROMORPHONE HYDROCHLORIDE 1 MG/ML
1 INJECTION, SOLUTION INTRAMUSCULAR; INTRAVENOUS; SUBCUTANEOUS ONCE
Status: COMPLETED | OUTPATIENT
Start: 2025-05-15 | End: 2025-05-15

## 2025-05-15 RX ORDER — HYDROMORPHONE HYDROCHLORIDE 1 MG/ML
0.5 INJECTION, SOLUTION INTRAMUSCULAR; INTRAVENOUS; SUBCUTANEOUS ONCE
Status: COMPLETED | OUTPATIENT
Start: 2025-05-15 | End: 2025-05-15

## 2025-05-15 RX ORDER — ONDANSETRON 4 MG/1
8 TABLET, FILM COATED ORAL EVERY 8 HOURS PRN
Qty: 21 TABLET | Refills: 1 | Status: SHIPPED | OUTPATIENT
Start: 2025-05-15

## 2025-05-15 RX ORDER — FENTANYL CITRATE 50 UG/ML
INJECTION, SOLUTION INTRAMUSCULAR; INTRAVENOUS PRN
Status: COMPLETED | OUTPATIENT
Start: 2025-05-15 | End: 2025-05-15

## 2025-05-15 RX ORDER — MIDAZOLAM HYDROCHLORIDE 5 MG/5ML
INJECTION, SOLUTION INTRAMUSCULAR; INTRAVENOUS PRN
Status: COMPLETED | OUTPATIENT
Start: 2025-05-15 | End: 2025-05-15

## 2025-05-15 RX ORDER — LIDOCAINE HYDROCHLORIDE 10 MG/ML
10 INJECTION, SOLUTION EPIDURAL; INFILTRATION; INTRACAUDAL; PERINEURAL ONCE
Status: DISCONTINUED | OUTPATIENT
Start: 2025-05-15 | End: 2025-05-19 | Stop reason: HOSPADM

## 2025-05-15 RX ORDER — OXYCODONE AND ACETAMINOPHEN 5; 325 MG/1; MG/1
2 TABLET ORAL EVERY 6 HOURS PRN
Refills: 0 | Status: DISCONTINUED | OUTPATIENT
Start: 2025-05-15 | End: 2025-05-19 | Stop reason: HOSPADM

## 2025-05-15 RX ORDER — ONDANSETRON 2 MG/ML
4 INJECTION INTRAMUSCULAR; INTRAVENOUS ONCE
Status: COMPLETED | OUTPATIENT
Start: 2025-05-15 | End: 2025-05-15

## 2025-05-15 RX ORDER — LIDOCAINE HYDROCHLORIDE 10 MG/ML
INJECTION, SOLUTION INFILTRATION; PERINEURAL
Status: DISPENSED
Start: 2025-05-15 | End: 2025-05-15

## 2025-05-15 RX ORDER — HYDROMORPHONE HYDROCHLORIDE 1 MG/ML
0.5 INJECTION, SOLUTION INTRAMUSCULAR; INTRAVENOUS; SUBCUTANEOUS ONCE
Status: DISCONTINUED | OUTPATIENT
Start: 2025-05-15 | End: 2025-05-15

## 2025-05-15 RX ORDER — ONDANSETRON 4 MG/1
4 TABLET, ORALLY DISINTEGRATING ORAL ONCE
Status: COMPLETED | OUTPATIENT
Start: 2025-05-15 | End: 2025-05-15

## 2025-05-15 RX ADMIN — FENTANYL CITRATE 50 MCG: 50 INJECTION INTRAMUSCULAR; INTRAVENOUS at 09:25

## 2025-05-15 RX ADMIN — HYDROMORPHONE HYDROCHLORIDE 0.5 MG: 1 INJECTION, SOLUTION INTRAMUSCULAR; INTRAVENOUS; SUBCUTANEOUS at 10:57

## 2025-05-15 RX ADMIN — MIDAZOLAM HYDROCHLORIDE 1 MG: 1 INJECTION, SOLUTION INTRAMUSCULAR; INTRAVENOUS at 09:35

## 2025-05-15 RX ADMIN — MIDAZOLAM HYDROCHLORIDE 1 MG: 1 INJECTION, SOLUTION INTRAMUSCULAR; INTRAVENOUS at 09:40

## 2025-05-15 RX ADMIN — MIDAZOLAM HYDROCHLORIDE 1 MG: 1 INJECTION, SOLUTION INTRAMUSCULAR; INTRAVENOUS at 09:30

## 2025-05-15 RX ADMIN — ONDANSETRON 4 MG: 2 INJECTION, SOLUTION INTRAMUSCULAR; INTRAVENOUS at 09:07

## 2025-05-15 RX ADMIN — HYDROMORPHONE HYDROCHLORIDE 0.5 MG: 1 INJECTION, SOLUTION INTRAMUSCULAR; INTRAVENOUS; SUBCUTANEOUS at 10:26

## 2025-05-15 RX ADMIN — CEFAZOLIN 2000 MG: 1 INJECTION, POWDER, FOR SOLUTION INTRAMUSCULAR; INTRAVENOUS at 09:07

## 2025-05-15 RX ADMIN — MIDAZOLAM HYDROCHLORIDE 2 MG: 1 INJECTION, SOLUTION INTRAMUSCULAR; INTRAVENOUS at 09:45

## 2025-05-15 RX ADMIN — MIDAZOLAM HYDROCHLORIDE 2 MG: 1 INJECTION, SOLUTION INTRAMUSCULAR; INTRAVENOUS at 09:25

## 2025-05-15 RX ADMIN — FENTANYL CITRATE 25 MCG: 50 INJECTION INTRAMUSCULAR; INTRAVENOUS at 09:35

## 2025-05-15 RX ADMIN — FENTANYL CITRATE 25 MCG: 50 INJECTION INTRAMUSCULAR; INTRAVENOUS at 09:30

## 2025-05-15 RX ADMIN — FENTANYL CITRATE 50 MCG: 50 INJECTION INTRAMUSCULAR; INTRAVENOUS at 09:45

## 2025-05-15 RX ADMIN — FENTANYL CITRATE 25 MCG: 50 INJECTION INTRAMUSCULAR; INTRAVENOUS at 10:50

## 2025-05-15 RX ADMIN — ONDANSETRON 4 MG: 4 TABLET, ORALLY DISINTEGRATING ORAL at 12:50

## 2025-05-15 RX ADMIN — HYDROMORPHONE HYDROCHLORIDE 1 MG: 1 INJECTION, SOLUTION INTRAMUSCULAR; INTRAVENOUS; SUBCUTANEOUS at 11:32

## 2025-05-15 ASSESSMENT — PAIN - FUNCTIONAL ASSESSMENT
PAIN_FUNCTIONAL_ASSESSMENT: NONE - DENIES PAIN
PAIN_FUNCTIONAL_ASSESSMENT: 0-10
PAIN_FUNCTIONAL_ASSESSMENT: 0-10
PAIN_FUNCTIONAL_ASSESSMENT: ADULT NONVERBAL PAIN SCALE (NPVS)
PAIN_FUNCTIONAL_ASSESSMENT: ADULT NONVERBAL PAIN SCALE (NPVS)
PAIN_FUNCTIONAL_ASSESSMENT: 0-10
PAIN_FUNCTIONAL_ASSESSMENT: ADULT NONVERBAL PAIN SCALE (NPVS)

## 2025-05-15 ASSESSMENT — PAIN DESCRIPTION - DESCRIPTORS
DESCRIPTORS: ACHING;SORE

## 2025-05-15 NOTE — PROGRESS NOTES
0830    Pt arrived to XR recovery for Liver Ablation. Pt is A/O x4 with no complaints of pain. VS to be collected and consent to be obtained. Pt bed is locked and in lowest position and call bell is within reach.    1005    Name of procedure: Liver Ablation    Sedation medications given:    Versed: 7 mg    Fentanyl: 175 mcg    Reversal Agent Used: None    Sedation tolerated: Yes    Sedation start: 0925     Sedation end: 1005     Vital Signs: See flowsheets    Any complications related to procedure: None    Post Procedure Care Needed/order sets placed in [facility] care.     Patient is at increased fall risk due to medication given.      1015    Pt with 7/10 abd pain post procedure. Dr. Dozier notified and verbal order received for 0.5 mg of IV dilaudid x1    1045    Pt with no improvement in abd pain and tearful. Spoke with Dr. Dozier and received verbal order for another 0.5 mg IV dilaudid x1. Pt also provided with ice cheryl    1125    Pt still with c/o abd post procedure and tearful. Pt states minor improvement 6-7/10. Spoke with Dr. Dozier and verbal order received for x1 dose of 1 mg of IV dilaudid.    1200    Pt states pain is feeling much better 3/10.     1205    Dr. Dozier at bedside. Per MD pt is cleared for DC. Pt also states she feels ready to go home.     1210    Called [facility] and verified RX was received.     1245    Pt was dc'ing and stated she was feeling nauseous. Pt brought back to Recovery area BP was checked . Notified Dr. Dozier and received verbal order x1 dose of 4 mg PO zofran. MD to also send Rx to pharmacy.    1256    Called [facility] and verified RX for zofran was received.    1305    Pt stated nausea is feeling better and she feels ready to go home.    Pt d/c home with friend. Pt tolerating PO intake and procedure site is [facility]. I reviewed all d/c instructions. Pt stated understanding. Removed all IV/tele. Pt left with all personal belongings.

## 2025-05-15 NOTE — H&P
Interventional and Vascular Radiology History and Physical    Patient: Cristina Razo 49 y.o. female       Chief Complaint: No chief complaint on file.      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    PONV (postoperative nausea and vomiting)     PSVT (paroxysmal supraventricular tachycardia)     f/b Dr Lewis    Psychiatric disorder     anxiety and depression    Vaginal yeast infection      Family History   Problem Relation Age of Onset    ADHD Child     Other Child         autism and dyslexia    Stroke Mother     Cancer Mother 78        lymphoma    Neuropathy Mother     Diabetes Mother     Lung Disease Father     Heart Disease Paternal Grandmother     Stroke Paternal Grandmother     Anxiety Disorder Child     Cancer Maternal Grandmother         pancreatic    Heart Disease Father     Cancer Father         prostate    High Cholesterol Father     Stroke Father      Social History     Socioeconomic History    Marital status:      Spouse name: Not on file    Number of children: Not on file    Years of education: Not on file    Highest education level: Not on file   Occupational History    Not on file   Tobacco Use    Smoking status: Never    Smokeless tobacco: Never   Vaping Use    Vaping status: Some Days    Substances: THC    Devices: Refillable tank   Substance and Sexual Activity    Alcohol use: Yes    Drug use: Yes     Types: Marijuana (Weed)     Comment: marijuana   used  about   a week   ago/    edible    Sexual activity: Yes     Partners: Male   Other Topics Concern    Not on file   Social History Narrative    ** Merged History Encounter **          Social Drivers of Health     Financial Resource Strain: Not on file   Food Insecurity:

## 2025-05-15 NOTE — DISCHARGE INSTRUCTIONS
[facility]  Department of Interventional Radiology  [address], [place] [id number] [phone]    LIVER ABLATION DISCHARGE EDUCATION    Radiologist: Dr. Dozier      Date: 5/15/2025     Information:   Microwave ablation is a treatment that uses microwave energy to heat and destroy cancer cells. Probes are placed into the tumor which heat the surrounding tissue and destroy the cancer cells. The number of probes used is dependent on the size of the tumor. This procedure is done under Conscious Sedation and CT (Cat Scan) guidance. Depending on the size of the tumor, a microwave ablation can shrink or kill a tumor. This procedure can often be repeated if the cancer comes back (recurs), or if the tumor has an incomplete response.     What should I expect after the Liver Ablation?    Pain post procedure may last up to 5-7 days, tapering in intensity. This usually does not require more than a few days of prescription pain medicine and many patients   only use over the counter pain medication. To relieve pain, take Tylenol (acetaminophen) or the pain medicine your doctor prescribed. Avoid ibuprofen (Advil, Motrin) and   aspirin as they may cause you to bleed.   Do not plan anything active or requiring your close attention for the first few days following discharge from the hospital. If you work, you may return as soon as you feel   able. Most patients do not need more than 2-3 days away from work.    There are no dietary restrictions specifically due to this procedure. If you had restrictions prior due to other treatments or diseases such as diabetes, cardiovascular   disease, or renal disease, continue with these.     Bathing & Wound Care:    It is okay to shower 24 hours after the procedure. Gently wash the catheter insertion site with soap and water, do not scrub only after 3 days post procedure. Do not   bathe or soak in water for 3 days following the procedure.

## 2025-07-02 ENCOUNTER — TRANSCRIBE ORDERS (OUTPATIENT)
Facility: HOSPITAL | Age: 50
End: 2025-07-02

## 2025-07-02 DIAGNOSIS — C18.9 ADENOCARCINOMA OF COLON (HCC): Primary | ICD-10-CM

## 2025-07-02 DIAGNOSIS — C78.7 METASTASIS TO LIVER (HCC): ICD-10-CM

## 2025-08-02 ENCOUNTER — APPOINTMENT (OUTPATIENT)
Facility: HOSPITAL | Age: 50
End: 2025-08-02
Payer: COMMERCIAL

## 2025-08-02 ENCOUNTER — HOSPITAL ENCOUNTER (EMERGENCY)
Facility: HOSPITAL | Age: 50
Discharge: HOME | End: 2025-08-02
Attending: EMERGENCY MEDICINE
Payer: COMMERCIAL

## 2025-08-02 VITALS
OXYGEN SATURATION: 97 % | TEMPERATURE: 97.9 F | DIASTOLIC BLOOD PRESSURE: 70 MMHG | HEART RATE: 68 BPM | RESPIRATION RATE: 20 BRPM | SYSTOLIC BLOOD PRESSURE: 122 MMHG

## 2025-08-02 DIAGNOSIS — S81.012A LACERATION OF LEFT KNEE, INITIAL ENCOUNTER: Primary | ICD-10-CM

## 2025-08-02 PROCEDURE — 96374 THER/PROPH/DIAG INJ IV PUSH: CPT

## 2025-08-02 PROCEDURE — 99284 EMERGENCY DEPT VISIT MOD MDM: CPT

## 2025-08-02 PROCEDURE — 73700 CT LOWER EXTREMITY W/O DYE: CPT

## 2025-08-02 PROCEDURE — 6370000000 HC RX 637 (ALT 250 FOR IP)

## 2025-08-02 PROCEDURE — 90715 TDAP VACCINE 7 YRS/> IM: CPT | Performed by: EMERGENCY MEDICINE

## 2025-08-02 PROCEDURE — 6360000002 HC RX W HCPCS: Performed by: EMERGENCY MEDICINE

## 2025-08-02 PROCEDURE — 12002 RPR S/N/AX/GEN/TRNK2.6-7.5CM: CPT

## 2025-08-02 PROCEDURE — 90471 IMMUNIZATION ADMIN: CPT | Performed by: EMERGENCY MEDICINE

## 2025-08-02 PROCEDURE — 6370000000 HC RX 637 (ALT 250 FOR IP): Performed by: EMERGENCY MEDICINE

## 2025-08-02 RX ORDER — LIDOCAINE HYDROCHLORIDE AND EPINEPHRINE 10; 10 MG/ML; UG/ML
20 INJECTION, SOLUTION INFILTRATION; PERINEURAL ONCE
Status: DISCONTINUED | OUTPATIENT
Start: 2025-08-02 | End: 2025-08-02 | Stop reason: HOSPADM

## 2025-08-02 RX ORDER — KETOROLAC TROMETHAMINE 30 MG/ML
15 INJECTION, SOLUTION INTRAMUSCULAR; INTRAVENOUS ONCE
Status: COMPLETED | OUTPATIENT
Start: 2025-08-02 | End: 2025-08-02

## 2025-08-02 RX ORDER — CEPHALEXIN 500 MG/1
500 CAPSULE ORAL 2 TIMES DAILY
Qty: 14 CAPSULE | Refills: 0 | Status: SHIPPED | OUTPATIENT
Start: 2025-08-02 | End: 2025-08-09

## 2025-08-02 RX ORDER — ACETAMINOPHEN 500 MG
1000 TABLET ORAL
Status: COMPLETED | OUTPATIENT
Start: 2025-08-02 | End: 2025-08-02

## 2025-08-02 RX ORDER — OXYCODONE HYDROCHLORIDE 5 MG/1
5 TABLET ORAL EVERY 6 HOURS PRN
Qty: 5 TABLET | Refills: 0 | Status: SHIPPED | OUTPATIENT
Start: 2025-08-02 | End: 2025-08-05

## 2025-08-02 RX ORDER — OXYCODONE HYDROCHLORIDE 5 MG/1
5 TABLET ORAL
Refills: 0 | Status: COMPLETED | OUTPATIENT
Start: 2025-08-02 | End: 2025-08-02

## 2025-08-02 RX ORDER — NEOMYCIN/BACITRACIN/POLYMYXINB 3.5-400-5K
OINTMENT (GRAM) TOPICAL
Status: COMPLETED | OUTPATIENT
Start: 2025-08-02 | End: 2025-08-02

## 2025-08-02 RX ADMIN — ACETAMINOPHEN 1000 MG: 500 TABLET ORAL at 17:08

## 2025-08-02 RX ADMIN — BACITRACIN ZINC, NEOMYCIN, POLYMYXIN B 1 G: 400; 3.5; 5 OINTMENT TOPICAL at 20:22

## 2025-08-02 RX ADMIN — OXYCODONE 5 MG: 5 TABLET ORAL at 17:08

## 2025-08-02 RX ADMIN — TETANUS TOXOID, REDUCED DIPHTHERIA TOXOID AND ACELLULAR PERTUSSIS VACCINE, ADSORBED 0.5 ML: 5; 2.5; 8; 8; 2.5 SUSPENSION INTRAMUSCULAR at 17:09

## 2025-08-02 RX ADMIN — KETOROLAC TROMETHAMINE 15 MG: 30 INJECTION, SOLUTION INTRAMUSCULAR at 17:09

## 2025-08-02 ASSESSMENT — PAIN SCALES - GENERAL
PAINLEVEL_OUTOF10: 7
PAINLEVEL_OUTOF10: 8

## 2025-08-02 ASSESSMENT — PAIN DESCRIPTION - LOCATION: LOCATION: KNEE

## 2025-08-02 ASSESSMENT — PAIN DESCRIPTION - ORIENTATION: ORIENTATION: LEFT

## 2025-08-02 NOTE — ED NOTES
Bedside and Verbal shift change report given to Kody (oncoming nurse) by Graciela (offgoing nurse). Report included the following information Nurse Handoff Report, ED Encounter Summary, ED SBAR, Adult Overview, MAR, Recent Results, and Neuro Assessment.

## 2025-08-02 NOTE — PROCEDURES
PROCEDURE NOTE  Date: 8/2/2025   Name: Cristina Razo  YOB: 1975    Procedures      Procedure Note - Laceration Repair:  7:00 PM EDT   Procedure by JAYLYN Lewis NP   6 cm v-shaped laceration to left knee  was Copiously irrigated with saline under jet lavage, prepped with shur clens and sterile water and draped in a sterile fashion.  The area was anesthetized via local infiltration of 8 mL Lidocaine with epinephrine.    The wound was explored with the following results: No foreign bodies found.  The wound was repaired with One layer suture closure: Skin Layer:  9 single interrupted sutures and 1 horizontal mattress suture placed, stitch type: suture: 4-0 nylon.  The wound was closed with good hemostasis and approximation.  Sterile dressing applied.  Complex (Intermediate PLUS Deep structure exposure, Debridement, Vermilion border/nostril rim, OR Extensive undermining  Estimated blood loss: minimal  The procedure took 31-45 minutes, and patient tolerated well.

## 2025-08-02 NOTE — DISCHARGE INSTRUCTIONS
Apply antibiotic ointment on wound daily.  Keep in knee immobilizer is much as possible until sutures removed in 10 to 14 days as this is a high risk area for the laceration to open up again.    Thank You!    It was a pleasure taking care of you in our Emergency Department today. We know that when you come to our Emergency Department, you are entrusting us with your health, comfort, and safety. Our physicians and nurses honor that trust, and truly appreciate the opportunity to care for you and your loved ones.      We also value your feedback. If you receive a survey about your Emergency Department experience today, please fill it out.  We care about our patients' feedback, and we listen to what you have to say.  Thank you.    Nba Giraldo MD  ________________________________________________________________________  I have included a copy of your lab results and/or radiologic studies from today's visit so you can have them easily available at your follow-up visit. We hope you feel better and please do not hesitate to contact the ED if you have any questions at all!    No results found for this or any previous visit (from the past 12 hours).  CT KNEE LEFT WO CONTRAST   Final Result      1. Large soft tissue wound anterior to the patellar tendon and patellar with   high density debris or foreign bodies within the soft tissues. No fracture or   joint effusion      Electronically signed by Violeta Benito          The exam and treatment you received in the Emergency Department were for an urgent problem and are not intended as complete care. It is important that you follow up with a doctor, nurse practitioner, or physician assistant for ongoing care. If your symptoms become worse or you do not improve as expected and you are unable to reach your usual health care provider, you should return to the Emergency Department. We are available 24 hours a day.    Please take your discharge instructions with you when you go to
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
## (undated) DEVICE — Device

## (undated) DEVICE — CATHETER CHOLGM 4.5FR L18IN W/ MTL SUPP TB

## (undated) DEVICE — SET ADMIN 16ML TBNG L100IN 2 Y INJ SITE IV PIGGY BK DISP

## (undated) DEVICE — BLADE HARM SCALP HK DISSECTING --

## (undated) DEVICE — Z DISCONTINUED PER MEDLINE LINE GAS SAMPLING O2/CO2 LNG AD 13 FT NSL W/ TBNG FILTERLINE

## (undated) DEVICE — COVER,MAYO STAND,STERILE: Brand: MEDLINE

## (undated) DEVICE — 1000ML,PRESSURE INFUSER W/STOPCOCK: Brand: MEDLINE

## (undated) DEVICE — TROCAR: Brand: KII SLEEVE

## (undated) DEVICE — SUTURE SZ 0 27IN 5/8 CIR UR-6  TAPER PT VIOLET ABSRB VICRYL J603H

## (undated) DEVICE — CATH IV AUTOGRD BC PNK 20GA 25 -- INSYTE

## (undated) DEVICE — NEEDLE HYPO 22GA L1.5IN BLK S STL HUB POLYPR SHLD REG BVL

## (undated) DEVICE — HANDLE LT SNAP ON ULT DURABLE LENS FOR TRUMPF ALC DISPOSABLE

## (undated) DEVICE — TUBING, SUCTION, 1/4" X 10', STRAIGHT: Brand: MEDLINE

## (undated) DEVICE — C-ARM: Brand: UNBRANDED

## (undated) DEVICE — 40580 - THE PINK PAD - ADVANCED TRENDELENBURG POSITIONING KIT: Brand: 40580 - THE PINK PAD - ADVANCED TRENDELENBURG POSITIONING KIT

## (undated) DEVICE — STAPLER EXT 65MM S STL AUTO DISP PURSTRING

## (undated) DEVICE — VISUALIZATION SYSTEM: Brand: CLEARIFY

## (undated) DEVICE — KIT,1200CC CANISTER,3/16"X6' TUBING: Brand: MEDLINE INDUSTRIES, INC.

## (undated) DEVICE — SYR 3ML LL TIP 1/10ML GRAD --

## (undated) DEVICE — 3M™ DURAPORE™ SURGICAL TAPE 1538-3, 3 INCH X 10 YARD (7,5CM X 9,1M), 4 ROLLS/BOX: Brand: 3M™ DURAPORE™

## (undated) DEVICE — SUTURE VCRL SZ 2-0 L27IN ABSRB UD L26MM SH 1/2 CIR J417H

## (undated) DEVICE — HYPODERMIC SAFETY NEEDLE: Brand: MAGELLAN

## (undated) DEVICE — ELECTRODE ES SHFT L29CM HK OD5MM ENDOPATH PRB + II

## (undated) DEVICE — CATHETER IV 14GA L2IN POLYUR STR ORNG HUB SFTY RADPQ DISP

## (undated) DEVICE — GENERAL LAPAROSCOPY-MRMC: Brand: MEDLINE INDUSTRIES, INC.

## (undated) DEVICE — SOLUTION IV 1000ML 0.9% SOD CHL

## (undated) DEVICE — 3M™ IOBAN™ 2 ANTIMICROBIAL INCISE DRAPE 6650EZ: Brand: IOBAN™ 2

## (undated) DEVICE — TOWEL SURG W17XL27IN STD BLU COT NONFENESTRATED PREWASHED

## (undated) DEVICE — SOLIDIFIER FLD 2OZ 1500CC N DISINF IN BTL DISP SAFESORB

## (undated) DEVICE — SOLUTION IRRIG 1000ML STRL H2O USP PLAS POUR BTL

## (undated) DEVICE — ROCKER SWITCH PENCIL BLADE ELECTRODE, HOLSTER: Brand: EDGE

## (undated) DEVICE — ELECTRODE,RADIOTRANSLUCENT,FOAM,5PK: Brand: MEDLINE

## (undated) DEVICE — GOWN,SIRUS,NONRNF,SETINSLV,2XL,18/CS: Brand: MEDLINE

## (undated) DEVICE — LAPAROSCOPIC TROCAR SLEEVE/SINGLE USE: Brand: KII® OPTICAL ACCESS SYSTEM

## (undated) DEVICE — DRAPE,REIN 53X77,STERILE: Brand: MEDLINE

## (undated) DEVICE — INFECTION CONTROL KIT SYS

## (undated) DEVICE — PAD 05IN BASE 3IN PEAK M DENS CONVOLUTED FOAM

## (undated) DEVICE — LIQUIBAND RAPID ADHESIVE 36/CS 0.8ML: Brand: MEDLINE

## (undated) DEVICE — STAPLER 60: Brand: SUREFORM

## (undated) DEVICE — Z INACTIVE USE 2527070 DRAPE SURG W40XL44IN UNDERBUTTOCK SMS POLYPR W/ PCH BK DISP

## (undated) DEVICE — TABLE COVER: Brand: CONVERTORS

## (undated) DEVICE — STRIP,CLOSURE,WOUND,MEDI-STRIP,1/2X4: Brand: MEDLINE

## (undated) DEVICE — STERILE POLYISOPRENE POWDER-FREE SURGICAL GLOVES: Brand: PROTEXIS

## (undated) DEVICE — SUTURE PROL SZ 2-0 L36IN NONABSORBABLE BLU SH L26MM 1/2 CIR 8523H

## (undated) DEVICE — SET EXT W/2 NEEDLELESS Y-SITES 4-WAY STOPCOCK

## (undated) DEVICE — ARM DRAPE

## (undated) DEVICE — SUTURE MCRYL SZ 4-0 L27IN ABSRB UD L19MM PS-2 1/2 CIR PRIM Y426H

## (undated) DEVICE — SPONGE LAP 18X18IN STRL -- 5/PK

## (undated) DEVICE — 3000CC GUARDIAN II: Brand: GUARDIAN

## (undated) DEVICE — PAD SANIT NPKN 4IN GRD

## (undated) DEVICE — TRAY PREP DRY W/ PREM GLV 2 APPL 6 SPNG 2 UNDPD 1 OVERWRAP

## (undated) DEVICE — TROCAR LAP L100MM DIA5MM BLDELSS W/ STBL SL ENDOPATH XCEL

## (undated) DEVICE — SOLUTION ANTIFOG VIS SYS CLEARIFY LAPSCP

## (undated) DEVICE — GRASPER ENDOSCP TIP L10MM ANVIL ROT RATCH HNDL DISP

## (undated) DEVICE — INSUFFLATION NEEDLE: Brand: SURGINEEDLE

## (undated) DEVICE — DECANTER BAG 9": Brand: MEDLINE INDUSTRIES, INC.

## (undated) DEVICE — KENDALL SCD EXPRESS SLEEVES, KNEE LENGTH, MEDIUM: Brand: KENDALL SCD

## (undated) DEVICE — VESSEL SEALER: Brand: ENDOWRIST

## (undated) DEVICE — DRAPE,ROBOTICS,STERILE: Brand: MEDLINE

## (undated) DEVICE — EVAC SMOKE SEECLEAR XCL -- SEE CLEAR

## (undated) DEVICE — GLOVE ORANGE PI 7   MSG9070

## (undated) DEVICE — HANDLE PRB DIA5MM HND CTRL PSTL GRP ENDOPATH PRB + II

## (undated) DEVICE — BASIN EMSIS 16OZ GRAPHITE PLAS KID SHP MOLD GRAD FOR ORAL

## (undated) DEVICE — ELECTRO LUBE IS A SINGLE PATIENT USE DEVICE THAT IS INTENDED TO BE USED ON ELECTROSURGICAL ELECTRODES TO REDUCE STICKING.: Brand: KEY SURGICAL ELECTRO LUBE

## (undated) DEVICE — LEGGINGS: Brand: CONVERTORS

## (undated) DEVICE — SEAL

## (undated) DEVICE — NEONATAL-ADULT SPO2 SENSOR: Brand: NELLCOR

## (undated) DEVICE — (D)PREP SKN CHLRAPRP APPL 26ML -- CONVERT TO ITEM 371833

## (undated) DEVICE — SUTURE STRATAFIX SPRL PDS + SZ 2-0 L6IN ABSRB VLT L36MM SXPP1B409

## (undated) DEVICE — SOLUTION IV 1000ML 0.9% SOD CHL PH 5 INJ USP VIAFLX PLAS

## (undated) DEVICE — TOWEL 4 PLY TISS 19X30 SUE WHT

## (undated) DEVICE — SEAL UNIV 5-8MM DISP BX/10 -- DA VINCI XI - SNGL USE

## (undated) DEVICE — ADPT HND SWCH HARM SCALP DISP --

## (undated) DEVICE — DERMABOND SKIN ADH 0.7ML -- DERMABOND ADVANCED 12/BX

## (undated) DEVICE — REDUCER CANN ENDOWRIST 12-8MM -- DA VINCI XI - SNGL USE

## (undated) DEVICE — SYR 10ML LUER LOK 1/5ML GRAD --

## (undated) DEVICE — GLOVE SURG SZ 75 L12IN FNGR THK79MIL GRN LTX FREE

## (undated) DEVICE — SUT MCRYL 4-0 27IN PS2 UD --

## (undated) DEVICE — YANKAUER,POOLE TIP,STERILE,50/CS: Brand: MEDLINE

## (undated) DEVICE — TRAY CATH OD16FR SIL URIN M STATLOK STBL DEV SURSTP

## (undated) DEVICE — SUTURE PDS II SZ 0 L60IN ABSRB VLT L48MM CTX 1/2 CIR Z990G

## (undated) DEVICE — ACCESS PLATFORM FOR MINIMALLY INVASIVE SURGERY: Brand: GELPOINT®  MINI ADVANCED ACCESS PLATFORM

## (undated) DEVICE — TROCAR: Brand: KII FIOS FIRST ENTRY

## (undated) DEVICE — STAPLER INT DIA29MM CLS STPL H1.5-2.2MM OPN LEG L5.2MM 26

## (undated) DEVICE — TUBE KT ENDFLTN INSUFFLTN SVL --

## (undated) DEVICE — SHEARS ENDOSCP L36CM DIA5MM ULTRASONIC CRV TIP W/ ADV

## (undated) DEVICE — SURGICAL PROCEDURE PACK BASIN MAJ SET CUST NO CAUT

## (undated) DEVICE — TOTAL TRAY, 16FR 10ML SIL FOLEY, URN: Brand: MEDLINE

## (undated) DEVICE — OBTRTR BLDELSS OPT 8MM DISP -- DA VINICI XI - SNGL USE

## (undated) DEVICE — SOLUTION IV 500ML 0.9% SOD CHL PH 5 INJ USP VIAFLX PLAS

## (undated) DEVICE — INTENDED FOR TISSUE SEPARATION, AND OTHER PROCEDURES THAT REQUIRE A SHARP SURGICAL BLADE TO PUNCTURE OR CUT.: Brand: BARD-PARKER ® CARBON RIB-BACK BLADES

## (undated) DEVICE — SYRINGE IRRIG 60ML SFT PLIABLE BLB EZ TO GRP 1 HND USE W/

## (undated) DEVICE — TUBING IRRIG L77IN DIA0.241IN L BOR FOR CYSTO W/ NVENT

## (undated) DEVICE — APPLIER CLP M/L SHFT DIA5MM 15 LIG LIGAMAX 5